# Patient Record
Sex: FEMALE | Race: WHITE | NOT HISPANIC OR LATINO | Employment: OTHER | URBAN - METROPOLITAN AREA
[De-identification: names, ages, dates, MRNs, and addresses within clinical notes are randomized per-mention and may not be internally consistent; named-entity substitution may affect disease eponyms.]

---

## 2017-06-15 ENCOUNTER — HOSPITAL ENCOUNTER (EMERGENCY)
Facility: CLINIC | Age: 62
Discharge: HOME | End: 2017-06-15
Attending: EMERGENCY MEDICINE | Admitting: EMERGENCY MEDICINE
Payer: COMMERCIAL

## 2017-06-15 ENCOUNTER — APPOINTMENT (OUTPATIENT)
Dept: CT IMAGING | Facility: CLINIC | Age: 62
End: 2017-06-15
Attending: EMERGENCY MEDICINE
Payer: COMMERCIAL

## 2017-06-15 ENCOUNTER — APPOINTMENT (OUTPATIENT)
Dept: GENERAL RADIOLOGY | Facility: CLINIC | Age: 62
End: 2017-06-15
Attending: EMERGENCY MEDICINE
Payer: COMMERCIAL

## 2017-06-15 VITALS
TEMPERATURE: 98.3 F | HEIGHT: 69 IN | HEART RATE: 89 BPM | DIASTOLIC BLOOD PRESSURE: 97 MMHG | SYSTOLIC BLOOD PRESSURE: 154 MMHG | RESPIRATION RATE: 20 BRPM | WEIGHT: 130 LBS | OXYGEN SATURATION: 98 % | BODY MASS INDEX: 19.26 KG/M2

## 2017-06-15 DIAGNOSIS — S92.001A CLOSED DISPLACED FRACTURE OF RIGHT CALCANEUS, UNSPECIFIED PORTION OF CALCANEUS, INITIAL ENCOUNTER: ICD-10-CM
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
## 2017-06-15 PROCEDURE — 96374 THER/PROPH/DIAG INJ IV PUSH: CPT

## 2017-06-15 PROCEDURE — 25000128 H RX IP 250 OP 636: Performed by: EMERGENCY MEDICINE

## 2017-06-15 PROCEDURE — 96376 TX/PRO/DX INJ SAME DRUG ADON: CPT

## 2017-06-15 PROCEDURE — 73610 X-RAY EXAM OF ANKLE: CPT | Mod: RT

## 2017-06-15 PROCEDURE — 29515 APPLICATION SHORT LEG SPLINT: CPT | Mod: RT

## 2017-06-15 PROCEDURE — 99285 EMERGENCY DEPT VISIT HI MDM: CPT | Mod: 25

## 2017-06-15 PROCEDURE — 73590 X-RAY EXAM OF LOWER LEG: CPT | Mod: RT

## 2017-06-15 PROCEDURE — 73700 CT LOWER EXTREMITY W/O DYE: CPT | Mod: RT

## 2017-06-15 PROCEDURE — 96375 TX/PRO/DX INJ NEW DRUG ADDON: CPT

## 2017-06-15 PROCEDURE — 73650 X-RAY EXAM OF HEEL: CPT | Mod: RT

## 2017-06-15 PROCEDURE — 25000128 H RX IP 250 OP 636

## 2017-06-15 RX ORDER — ONDANSETRON 2 MG/ML
4 INJECTION INTRAMUSCULAR; INTRAVENOUS ONCE
Status: COMPLETED | OUTPATIENT
Start: 2017-06-15 | End: 2017-06-15

## 2017-06-15 RX ORDER — OXYCODONE HYDROCHLORIDE 5 MG/1
5-10 TABLET ORAL EVERY 6 HOURS PRN
Qty: 20 TABLET | Refills: 0 | Status: SHIPPED | OUTPATIENT
Start: 2017-06-15 | End: 2018-07-08

## 2017-06-15 RX ORDER — KETOROLAC TROMETHAMINE 15 MG/ML
15 INJECTION, SOLUTION INTRAMUSCULAR; INTRAVENOUS ONCE
Status: COMPLETED | OUTPATIENT
Start: 2017-06-15 | End: 2017-06-15

## 2017-06-15 RX ORDER — HYDROMORPHONE HYDROCHLORIDE 1 MG/ML
0.5 INJECTION, SOLUTION INTRAMUSCULAR; INTRAVENOUS; SUBCUTANEOUS
Status: COMPLETED | OUTPATIENT
Start: 2017-06-15 | End: 2017-06-15

## 2017-06-15 RX ORDER — HYDROMORPHONE HYDROCHLORIDE 1 MG/ML
0.5 INJECTION, SOLUTION INTRAMUSCULAR; INTRAVENOUS; SUBCUTANEOUS ONCE
Status: COMPLETED | OUTPATIENT
Start: 2017-06-15 | End: 2017-06-15

## 2017-06-15 RX ORDER — ONDANSETRON 2 MG/ML
INJECTION INTRAMUSCULAR; INTRAVENOUS
Status: COMPLETED
Start: 2017-06-15 | End: 2017-06-15

## 2017-06-15 RX ORDER — HYDROMORPHONE HYDROCHLORIDE 1 MG/ML
INJECTION, SOLUTION INTRAMUSCULAR; INTRAVENOUS; SUBCUTANEOUS
Status: DISCONTINUED
Start: 2017-06-15 | End: 2017-06-15 | Stop reason: HOSPADM

## 2017-06-15 RX ADMIN — KETOROLAC TROMETHAMINE 15 MG: 15 INJECTION, SOLUTION INTRAMUSCULAR; INTRAVENOUS at 14:26

## 2017-06-15 RX ADMIN — ONDANSETRON 4 MG: 2 INJECTION INTRAMUSCULAR; INTRAVENOUS at 13:18

## 2017-06-15 RX ADMIN — HYDROMORPHONE HYDROCHLORIDE 0.5 MG: 1 INJECTION, SOLUTION INTRAMUSCULAR; INTRAVENOUS; SUBCUTANEOUS at 13:16

## 2017-06-15 RX ADMIN — ONDANSETRON 4 MG: 2 SOLUTION INTRAMUSCULAR; INTRAVENOUS at 13:18

## 2017-06-15 RX ADMIN — HYDROMORPHONE HYDROCHLORIDE 0.5 MG: 1 INJECTION, SOLUTION INTRAMUSCULAR; INTRAVENOUS; SUBCUTANEOUS at 14:05

## 2017-06-15 ASSESSMENT — ENCOUNTER SYMPTOMS
SHORTNESS OF BREATH: 0
NUMBNESS: 0
BACK PAIN: 0
ABDOMINAL PAIN: 0
NECK PAIN: 0

## 2017-06-15 NOTE — ED AVS SNAPSHOT
Emergency Department    [address] [place] [address]    Phone:  [phone]    Fax:  [phone]                                       Megan Bettencourt   MRN: [id number]    Department:   Emergency Department   Date of Visit:  6/15/2017           After Visit Summary Signature Page     I have received my discharge instructions, and my questions have been answered. I have discussed any challenges I see with this plan with the nurse or doctor.    ..........................................................................................................................................  Patient/Patient Representative Signature      ..........................................................................................................................................  Patient Representative Print Name and Relationship to Patient    ..................................................               ................................................  Date                                            Time    ..........................................................................................................................................  Reviewed by Signature/Title    ...................................................              ..............................................  Date                                                            Time

## 2017-06-15 NOTE — DISCHARGE INSTRUCTIONS
You have a broken bone on your calcaneal bone  You will likely need surgery   Follow up with [facility] as soon as you can. The doctors that should see you should be Jeronimo Qureshi or Zeinab  Don't walk on the foot  Keep the foot elevated as much as possible  Use tylenol 500 mg every 4 hours for pain (max dose 3000 mg per day)  Use the oxycodone for more severe pain    Discharge Instructions  Extremity Injury    You were seen today for an injury to an extremity (arm, hand, leg, or foot). You may have a bruise, strain, or fracture (broken bone).    Return to the Emergency Department or see your regular doctor if your injured area is not back to normal within 5-7 days.    Return to the Emergency Department right away if:    Your pain seems to change or get worse or there is pain in a new area.    Your extremity becomes pale, cool, blue, or numb or tingling past the injury.    You have more drainage, redness or pain in the area of the cut or abrasion.    You have pain that you can t control with the medicine recommended or prescribed here, or you have pain that seems too much for your injury.    Your child will not stop crying or is much more fussy than normal.    You have new symptoms or anything that worries you.    What to Expect:    Your swelling and pain may be worse the day after your injury, but should not be severe and should start getting better after that. You should not have new symptoms and your pain should not get worse.    You may start to get a bruise over the injured area or below the injured area.    Your movement and strength should get better with time.    Some injuries may not show up until after you have left the Emergency Department so it is important to follow-up with your regular doctor.    Your injury may prevent you from working.  Follow-up with your regular doctor to get a work release note.    Pain medications or your injury may make it unsafe to drive or operate  machinery.    Home Care:    Apply ice your injured area for 15 minutes at a time, at least 3 times a day. Use a cloth between the ice bag and your skin to prevent frostbite.     Do not sleep with an ice pack or heating pad on, since this can cause burns or skin injury.    Rest your injured area for at least 1-2 days. After that you may start using your extremity again as long as there is not too much pain.     Raise the injured area above the level of your heart as much as possible in the first 1-2 days.    Use Tylenol  (acetaminophen), Motrin (ibuprofen), or Advil  (ibuprofen) for your pain unless you have an allergy or are told not to use these medications by your doctor.  Take the medications as instructed on the package. Tylenol  (acetaminophen) is in many prescription medicines and non-prescription medicines--check all of your medicines to be sure you aren t taking more than 3000 mg per day.    You may use an elastic bandage (Ace  Wrap) if it makes you more comfortable. Wrap it just tight enough to provide light compression, like a new pair of socks feels. Loosen the bandage if you have swelling past the bandage.      Please follow any other instructions that were discussed with you by your doctor.    MORE INFORMATION:    X-rays:  X-rays done today were read by your doctor but will also be read by a radiologist.  We will contact you if the radiologist sees anything different on the x-ray.  Your regular doctor may also want to review your x-rays on follow-up.    You could have a fracture (break), even if we told you your x-rays were normal. X-rays are not always certain, and some fractures are hard to see and may not show up right away.  Also, your x-ray may look like you have a fracture, even though you do not.  It is important to follow-up with your regular doctor.     Stretching:  If your injury was to your arm or shoulder and your doctor put you in a sling or an immobilizer, it is important that you take off  your immobilizer within 3 days and stretch/move your shoulder, unless your doctor specifically tells you to not move your shoulder.  This is to prevent further injury such as a  frozen shoulder .     If you were given a prescription for medicine here today, be sure to read all of the information (including the package insert) that comes with your prescription.  This will include important information about the medicine, its side effects, and any warnings that you need to know about.  The pharmacist who fills the prescription can provide more information and answer questions you may have about the medicine.  If you have questions or concerns that the pharmacist cannot address, please call or return to the Emergency Department.     Opioid Medication Information    Pain medications are among the most commonly prescribed medicines, so we are including this information for all our patients. If you did not receive pain medication or get a prescription for pain medicine, you can ignore it.     You may have been given a prescription for an opioid (narcotic) pain medicine and/or have received a pain medicine while here in the Emergency Department. These medicines can make you drowsy or impaired. You must not drive, operate dangerous equipment, or engage in any other dangerous activities while taking these medications. If you drive while taking these medications, you could be arrested for DUI, or driving under the influence. Do not drink any alcohol while you are taking these medications.     Opioid pain medications can cause addiction. If you have a history of chemical dependency of any type, you are at a higher risk of becoming addicted to pain medications.  Only take these prescribed medications to treat your pain when all other options have been tried. Take it for as short a time and as few doses as possible. Store your pain pills in a secure place, as they are frequently stolen and provide a dangerous opportunity for  children or visitors in your house to start abusing these powerful medications. We will not replace any lost or stolen medicine.  As soon as your pain is better, you should flush all your remaining medication.     Many prescription pain medications contain Tylenol  (acetaminophen), including Vicodin , Tylenol #3 , Norco , Lortab , and Percocet .  You should not take any extra pills of Tylenol  if you are using these prescription medications or you can get very sick.  Do not ever take more than 3000 mg of acetaminophen in any 24 hour period.    All opioids tend to cause constipation. Drink plenty of water and eat foods that have a lot of fiber, such as fruits, vegetables, prune juice, apple juice and high fiber cereal.  Take a laxative if you don t move your bowels at least every other day. Miralax , Milk of Magnesia, Colace , or Senna  can be used to keep you regular.      Remember that you can always come back to the Emergency Department if you are not able to see your regular doctor in the amount of time listed above, if you get any new symptoms, or if there is anything that worries you.

## 2017-06-15 NOTE — ED AVS SNAPSHOT
Emergency Department    [address] [facility] [address]    Phone:  [phone]    Fax:  [phone]                                       Megan Bettencourt   MRN: [id number]    Department:   Emergency Department   Date of Visit:  6/15/2017           Patient Information     Date Of Birth          1955        Your diagnoses for this visit were:     Closed displaced fracture of right calcaneus, unspecified portion of calcaneus, initial encounter     Fall, initial encounter        You were seen by Mercedes Vargas MD.      Follow-up Information     Schedule an appointment as soon as possible for a visit with Orthopaedics, [facility].    Contact information:    [address] [address]  [phone]          Follow up with  Emergency Department.    Specialty:  EMERGENCY MEDICINE    Why:  If symptoms worsen    Contact information:    [address] [facility] [id number] [phone]        Discharge Instructions       You have a broken bone on your calcaneal bone  You will likely need surgery   Follow up with [facility] as soon as you can. The doctors that should see you should be Jeronimo Qureshi or Zeinab  Don't walk on the foot  Keep the foot elevated as much as possible  Use tylenol 500 mg every 4 hours for pain (max dose 3000 mg per day)  Use the oxycodone for more severe pain    Discharge Instructions  Extremity Injury    You were seen today for an injury to an extremity (arm, hand, leg, or foot). You may have a bruise, strain, or fracture (broken bone).    Return to the Emergency Department or see your regular doctor if your injured area is not back to normal within 5-7 days.    Return to the Emergency Department right away if:    Your pain seems to change or get worse or there is pain in a new area.    Your extremity becomes pale, cool, blue, or numb or tingling past the injury.    You have more drainage, redness or pain in the  area of the cut or abrasion.    You have pain that you can t control with the medicine recommended or prescribed here, or you have pain that seems too much for your injury.    Your child will not stop crying or is much more fussy than normal.    You have new symptoms or anything that worries you.    What to Expect:    Your swelling and pain may be worse the day after your injury, but should not be severe and should start getting better after that. You should not have new symptoms and your pain should not get worse.    You may start to get a bruise over the injured area or below the injured area.    Your movement and strength should get better with time.    Some injuries may not show up until after you have left the Emergency Department so it is important to follow-up with your regular doctor.    Your injury may prevent you from working.  Follow-up with your regular doctor to get a work release note.    Pain medications or your injury may make it unsafe to drive or operate machinery.    Home Care:    Apply ice your injured area for 15 minutes at a time, at least 3 times a day. Use a cloth between the ice bag and your skin to prevent frostbite.     Do not sleep with an ice pack or heating pad on, since this can cause burns or skin injury.    Rest your injured area for at least 1-2 days. After that you may start using your extremity again as long as there is not too much pain.     Raise the injured area above the level of your heart as much as possible in the first 1-2 days.    Use Tylenol  (acetaminophen), Motrin (ibuprofen), or Advil  (ibuprofen) for your pain unless you have an allergy or are told not to use these medications by your doctor.  Take the medications as instructed on the package. Tylenol  (acetaminophen) is in many prescription medicines and non-prescription medicines--check all of your medicines to be sure you aren t taking more than 3000 mg per day.    You may use an elastic bandage (Ace  Wrap) if it  makes you more comfortable. Wrap it just tight enough to provide light compression, like a new pair of socks feels. Loosen the bandage if you have swelling past the bandage.      Please follow any other instructions that were discussed with you by your doctor.    MORE INFORMATION:    X-rays:  X-rays done today were read by your doctor but will also be read by a radiologist.  We will contact you if the radiologist sees anything different on the x-ray.  Your regular doctor may also want to review your x-rays on follow-up.    You could have a fracture (break), even if we told you your x-rays were normal. X-rays are not always certain, and some fractures are hard to see and may not show up right away.  Also, your x-ray may look like you have a fracture, even though you do not.  It is important to follow-up with your regular doctor.     Stretching:  If your injury was to your arm or shoulder and your doctor put you in a sling or an immobilizer, it is important that you take off your immobilizer within 3 days and stretch/move your shoulder, unless your doctor specifically tells you to not move your shoulder.  This is to prevent further injury such as a  frozen shoulder .     If you were given a prescription for medicine here today, be sure to read all of the information (including the package insert) that comes with your prescription.  This will include important information about the medicine, its side effects, and any warnings that you need to know about.  The pharmacist who fills the prescription can provide more information and answer questions you may have about the medicine.  If you have questions or concerns that the pharmacist cannot address, please call or return to the Emergency Department.     Opioid Medication Information    Pain medications are among the most commonly prescribed medicines, so we are including this information for all our patients. If you did not receive pain medication or get a prescription  for pain medicine, you can ignore it.     You may have been given a prescription for an opioid (narcotic) pain medicine and/or have received a pain medicine while here in the Emergency Department. These medicines can make you drowsy or impaired. You must not drive, operate dangerous equipment, or engage in any other dangerous activities while taking these medications. If you drive while taking these medications, you could be arrested for DUI, or driving under the influence. Do not drink any alcohol while you are taking these medications.     Opioid pain medications can cause addiction. If you have a history of chemical dependency of any type, you are at a higher risk of becoming addicted to pain medications.  Only take these prescribed medications to treat your pain when all other options have been tried. Take it for as short a time and as few doses as possible. Store your pain pills in a secure place, as they are frequently stolen and provide a dangerous opportunity for children or visitors in your house to start abusing these powerful medications. We will not replace any lost or stolen medicine.  As soon as your pain is better, you should flush all your remaining medication.     Many prescription pain medications contain Tylenol  (acetaminophen), including Vicodin , Tylenol #3 , Norco , Lortab , and Percocet .  You should not take any extra pills of Tylenol  if you are using these prescription medications or you can get very sick.  Do not ever take more than 3000 mg of acetaminophen in any 24 hour period.    All opioids tend to cause constipation. Drink plenty of water and eat foods that have a lot of fiber, such as fruits, vegetables, prune juice, apple juice and high fiber cereal.  Take a laxative if you don t move your bowels at least every other day. Miralax , Milk of Magnesia, Colace , or Senna  can be used to keep you regular.      Remember that you can always come back to the Emergency Department if you are  not able to see your regular doctor in the amount of time listed above, if you get any new symptoms, or if there is anything that worries you.        24 Hour Appointment Hotline       To make an appointment at any [facility] clinic, call [phone] ([phone]). If you don't have a family doctor or clinic, we will help you find one. [facility] clinics are conveniently located to serve the needs of you and your family.             Review of your medicines      START taking        Dose / Directions Last dose taken    oxyCODONE 5 MG IR tablet   Commonly known as:  ROXICODONE   Dose:  5-10 mg   Quantity:  20 tablet        Take 1-2 tablets (5-10 mg) by mouth every 6 hours as needed for pain   Refills:  0          Our records show that you are taking the medicines listed below. If these are incorrect, please call your family doctor or clinic.        Dose / Directions Last dose taken    ADDERALL PO        Refills:  0        SYNTHROID PO        Refills:  0        venlafaxine 75 MG 24 hr capsule   Commonly known as:  EFFEXOR-XR   Dose:  175 mg   Indication:  Panic Disorder        Take 175 mg by mouth daily   Refills:  0                Prescriptions were sent or printed at these locations (1 Prescription)                   Other Prescriptions                Printed at Department/Unit printer (1 of 1)         oxyCODONE (ROXICODONE) 5 MG IR tablet                Procedures and tests performed during your visit     Ankle XR, G/E 3 views, right    CT Foot Right w/o Contrast    Tib/Fib XR, right    XR Calcaneus Right G/E 2 Views      Orders Needing Specimen Collection     None      Pending Results     No orders found from 6/13/2017 to 6/16/2017.            Pending Culture Results     No orders found from 6/13/2017 to 6/16/2017.            Pending Results Instructions     If you had any lab results that were not finalized at the time of your Discharge, you can call the ED Lab Result RN at [phone]. You will be contacted by  this team for any positive Lab results or changes in treatment. The nurses are available 7 days a week from 10A to 6:30P.  You can leave a message 24 hours per day and they will return your call.        Test Results From Your Hospital Stay        6/15/2017  1:55 PM      Narrative     XR ANKLE RT G/E 3 VW 6/15/2017 1:52 PM    HISTORY: Pain.    COMPARISON: None.        Impression     IMPRESSION: Lateral view demonstrates a mildly displaced calcaneal  fracture. The ankle mortise is intact.    JLUIS MINAYA MD         6/15/2017  1:56 PM      Narrative     XR TIBIA & FIBULA RT 2 VW 6/15/2017 1:53 PM    HISTORY: Pain.    COMPARISON: None.        Impression     IMPRESSION: No evidence of acute fracture or malalignment.    JLUIS MINAYA MD         6/15/2017  1:57 PM      Narrative     XR CALCANEUS RT G/E 2 VW 6/15/2017 1:52 PM    HISTORY: Pain.    COMPARISON: None.        Impression     IMPRESSION: Mildly displaced fracture of the calcaneus, likely  comminuted.    JLUIS MINAYA MD         6/15/2017  4:35 PM      Narrative     CT FOOT RIGHT WITHOUT CONTRAST  6/15/2017 2:46 PM    HISTORY: Evaluate calcaneus fracture.    TECHNIQUE: Helical axial scans with sagittal and coronal  reconstructions. Radiation dose for this scan was reduced using  automated exposure control, adjustment of the mA and/or kV according  to patient size, or iterative reconstruction technique.    COMPARISON: Plain film 6/15/2017.    FINDINGS: There is a comminuted three-part calcaneal fracture with  mild joint depression. There is intra-articular extension into the  lateral aspect of the subtalar joint with about 2 mm displacement  consistent with Evans classification type II A. There is also a  comminuted fracture in the sustentaculum maegan extending laterally into  the inferior sinus tarsi. Remainder of the foot and ankle show no  acute bony abnormalities. A type II accessory navicular is noted.  Subcutaneous edema is seen. Periarticular soft tissues  otherwise  appear normal to the extent visualized by CT.        Impression     IMPRESSION:  1. Comminuted three-part calcaneal fracture with mild joint depression  and single lateral focus of intra-articular extension indicating  Evans classification type II A.  2. Additional comminuted fracture sustentaculum maegan.  3. Type II accessory navicular is noted.    ROYCE MCCALLUM MD                Clinical Quality Measure: Blood Pressure Screening     Your blood pressure was checked while you were in the emergency department today. The last reading we obtained was  BP: 137/84 . Please read the guidelines below about what these numbers mean and what you should do about them.  If your systolic blood pressure (the top number) is less than 120 and your diastolic blood pressure (the bottom number) is less than 80, then your blood pressure is normal. There is nothing more that you need to do about it.  If your systolic blood pressure (the top number) is 120-139 or your diastolic blood pressure (the bottom number) is 80-89, your blood pressure may be higher than it should be. You should have your blood pressure rechecked within a year by a primary care provider.  If your systolic blood pressure (the top number) is 140 or greater or your diastolic blood pressure (the bottom number) is 90 or greater, you may have high blood pressure. High blood pressure is treatable, but if left untreated over time it can put you at risk for heart attack, stroke, or kidney failure. You should have your blood pressure rechecked by a primary care provider within the next 4 weeks.  If your provider in the emergency department today gave you specific instructions to follow-up with your doctor or provider even sooner than that, you should follow that instruction and not wait for up to 4 weeks for your follow-up visit.        Thank you for choosing [facility]       Thank you for choosing [facility] for your care. Our goal is always to provide you with  "excellent care. Hearing back from our patients is one way we can continue to improve our services. Please take a few minutes to complete the written survey that you may receive in the mail after you visit with us. Thank you!        [employer] Information     [employer] lets you send messages to your doctor, view your test results, renew your prescriptions, schedule appointments and more. To sign up, go to [email or web address] . Click on \"Log in\" on the left side of the screen, which will take you to the Welcome page. Then click on \"Sign up Now\" on the right side of the page.     You will be asked to enter the access code listed below, as well as some personal information. Please follow the directions to create your username and password.     Your access code is: [email or web address]  Expires: 2017  4:45 PM     Your access code will  in 90 days. If you need help or a new code, please call your [facility] clinic or [phone].        Care EveryWhere ID     This is your Care EveryWhere ID. This could be used by other organizations to access your [facility] medical records  [id number]        After Visit Summary       This is your record. Keep this with you and show to your community pharmacist(s) and doctor(s) at your next visit.                  "

## 2017-06-15 NOTE — ED PROVIDER NOTES
"  History     Chief Complaint:  Fall    HPI   Megan Bettencourt is a 62 year old female with a history of Anxiety, cervical stenosis, and thyroid disease who presents to the emergency department for evaluation of right ankle pain following a mechanical fall. The patient reports falling 10 feet off a roof approximately one hour ago and landed on her bilateral feet. She denies striking her head and any loss of consciousness with this incident. Following this fall, the patient reporting having intense right ankle pain, which radiates to her right leg as well. This intense pain is what prompted her ED visit today. She denies sustaining any other injuries as a result of this fall and denies any pain in her left leg and foot, commenting that side is \"perfectly normally\". She also denies any numbness, back pain, neck pain, abdominal pain, or shortness of breath. Not on anticoagulation.    Allergies:  Morphine Hcl,  itching   Vicodin [Hydrocodone-Acetaminophen], itching     Medications:    Synthroid  Adderall  Effexor    Past Medical History:    Anxiety  Cervical stenosis of spine   Thyroid disease    Past Surgical History:    Breast augmentation     Cosmetic blepharoplasty of bilateral lower lids  Cosmetic neck life  Bilateral ptosis repair    Family History:    No past pertinent family history.    Social History:  Presents alone.   Negative for tobacco use.  Positive for alcohol use, occasionally.   Marital Status:  Single [1]    Review of Systems   Respiratory: Negative for shortness of breath.    Gastrointestinal: Negative for abdominal pain.   Musculoskeletal: Negative for back pain and neck pain.        Positive for right ankle pain.   Neurological: Negative for numbness.   All other systems reviewed and are negative.    Physical Exam   First Vitals:  BP: 137/84  Pulse: 109  Temp: 98.3  F (36.8  C)  Resp: 28  Height: 175.3 cm (5' 9\")  Weight: 59 kg (130 lb)  SpO2: 99 %      Physical Exam  General: Resting " uncomfortably on the gurney  Eyes:  The pupils are equal and round  ENT:    Moist mucous membranes    No head trauma  Neck:  Normal range of motion    No neck pain  CV:  Tachycardic rate and regular rhythm     Skin warm and well perfused     DP/PT pulses 2+ bilaterally  Resp:  Lungs are clear    No rales    No wheezing   GI:  Abdomen is soft, there is no rigidity    No distension    No rebound tenderness    No abdominal tenderness   MS:  Normal muscular tone    No midline back tenderness    Swelling of lateral aspect of right foot.    Tenderness over right calcaneous    Non tender left lower extremity  Skin:  No rash or acute skin lesions noted  Neuro:   Awake, alert.      Speech is normal and fluent.    Face is symmetric.     Moves all extremities though less movement of right ankle due to pain    SILT on bilateral LE    GCS 15  Psych:  Normal affect.  Appropriate interactions.    Emergency Department Course   Imaging:  Radiographic findings were communicated with the patient who voiced understanding of the findings.    CT Foot Right without contrast:   1. Comminuted three-part calcaneal fracture with mild joint depression  and single lateral focus of intra-articular extension indicating  Evans classification type II A.  2. Additional comminuted fracture sustentaculum maegan.  3. Type II accessory navicular is noted. As per radiology.    Tib/fib XR, right:  No evidence of acute fracture or malalignment. As per radiology.     XR Calcaneus Right G/E 2 views:   Mildly displaced fracture of the calcaneus, likely  comminuted. As per radiology.     Ankle XR, G/E 3, right views:   Lateral view demonstrates a mildly displaced calcaneal  fracture. The ankle mortise is intact. As per radiology.     Procedures:      Narrative:      Splint was applied to the right lower extremity and after placement I checked and adjusted the fit to ensure proper positioning. Patient was more comfortable with ankle splint in place. Sensation  and circulation are intact after splint placement.    Interventions:  1316 Dilaudid, 0.5 mg, IV injection  1318 Zofran, 4 mg, IV injection  1405 Dilaudid, 0.5 mg, IV injection  1426 Toradol, 15 mg, IV injection    Emergency Department Course:  Nursing notes and vitals reviewed. I performed an exam of the patient as documented above.     IV inserted. Medicine administered as documented above. Blood drawn. This was sent to the lab for further testing, results above.    The patient was sent for a right foot CT, a Tib/fib XR, calcaneous XR, and ankle XR while in the emergency department, findings above.     1402 I rechecked the patient and discussed the results of their workup thus far. The patient denies any left foot pain and back pain.    1409 I rechecked the patient and discussed I would consult with orthopedics regarding her right ankle and foot.     1416 I consulted with Dr. Fritz, orthopedics, regarding the patient's history and presentation here in the emergency department. He recommended obtaining a CT.    1420 I rechecked the patient and updated her with orthopedist recommendation.    The patient was sent for a CT foot  while in the emergency department, findings above.     1526 The patient was placed in a right ankle splint, see procedure note above.    The patient was ambulated here in the emergency department with crutches without difficulty.     Findings and plan explained to the Patient. Patient discharged home with instructions regarding supportive care, medications, and reasons to return. The importance of close follow-up was reviewed. The patient was prescribed oxycodone.    Impression & Plan    Medical Decision Making:  Megan Bettencourt is a 62 year old female who presents to the emergency department after a fall. She thinks she fell about 10 feet. She did not hit her head and denies any injury anywhere else or any other pain. Her vital signs are normal. Neurovascularly intact and GCS 15. She appears  very uncomfortable upon her arrival to the emergency department, which is probably the cause her tachypnea and tachycardia. She is complaining of right foot pain. There is a mild amount of swelling by her right lateral aspect of her foot. No open wounds. She is tender over her right calcaneous. She denies any tenderness over her left calcaneous. She also denies pain over her abdomen or back. There is no pain on palpation of her back. The patient is given IV pain medication and her pain significantly improved. She was much more comfortable in the room and continues to deny back pain or left foot pain. X-ray was obtained and she does have a right calcaneous fracture. I discussed the patient with orthopedics, who does recommend CT of the foot to better assess this fracture. Placed in splint. The patient was given crutches and advised to elevate as much as possible and follow up with [facility] orthopedics. There are three doctors she should follow up with who specialize in this fracture and these were written down for the patient. Will likely need surgery.  The patient did have recent screening for osteoporosis and her levels are all normal; she reports she is already taking vitamin D and calcium. I recommended that she elevate her leg as much as possible. Patient continues to only have pain in right calcaneus. Does not meet criteria for imaging of head/neck/abdomen/back given that she has no pain, no LOC, GCS 15.Reasons to return to the emergency department were discussed with the patient.     Diagnosis:    ICD-10-CM   1. Closed displaced fracture of right calcaneus, unspecified portion of calcaneus, initial encounter S92.001A   2. Fall, initial encounter W19.XXXA       Disposition:  discharged to home    Discharge Medications:  New Prescriptions    OXYCODONE (ROXICODONE) 5 MG IR TABLET    Take 1-2 tablets (5-10 mg) by mouth every 6 hours as needed for pain       I, Katie Perkins, am serving as a scribe on 6/15/2017 at  1:24 PM to personally document services performed by Mercedes Vargas MD,  based on my observations and the provider's statements to me.     Katie Perkins  6/15/2017    EMERGENCY DEPARTMENT       Mercedes Vargas MD  06/15/17 8520

## 2018-03-07 ENCOUNTER — HOSPITAL ENCOUNTER (EMERGENCY)
Facility: CLINIC | Age: 63
Discharge: HOME | End: 2018-03-07
Attending: EMERGENCY MEDICINE | Admitting: EMERGENCY MEDICINE
Payer: COMMERCIAL

## 2018-03-07 ENCOUNTER — APPOINTMENT (OUTPATIENT)
Dept: GENERAL RADIOLOGY | Facility: CLINIC | Age: 63
End: 2018-03-07
Attending: EMERGENCY MEDICINE
Payer: COMMERCIAL

## 2018-03-07 VITALS
RESPIRATION RATE: 16 BRPM | SYSTOLIC BLOOD PRESSURE: 157 MMHG | DIASTOLIC BLOOD PRESSURE: 112 MMHG | OXYGEN SATURATION: 99 % | TEMPERATURE: 98.6 F

## 2018-03-07 DIAGNOSIS — S63.501A WRIST SPRAIN, RIGHT, INITIAL ENCOUNTER: ICD-10-CM

## 2018-03-07 PROCEDURE — 73110 X-RAY EXAM OF WRIST: CPT | Mod: RT

## 2018-03-07 PROCEDURE — 99283 EMERGENCY DEPT VISIT LOW MDM: CPT

## 2018-03-07 ASSESSMENT — ENCOUNTER SYMPTOMS
ARTHRALGIAS: 1
NUMBNESS: 0

## 2018-03-07 NOTE — ED AVS SNAPSHOT
Emergency Department    [address] [facility] [address]    Phone:  [phone]    Fax:  [phone]                                       Megan Bettencourt   MRN: [id number]    Department:   Emergency Department   Date of Visit:  3/7/2018           Patient Information     Date Of Birth          1955        Your diagnoses for this visit were:     Wrist sprain, right, initial encounter        You were seen by Trierweiler, Chad A, MD.      Follow-up Information     Follow up with Gunnar Sarkar MD In 1 week.    Specialty:  Family Practice    Why:  As needed    Contact information:    [facility]  [address]  [facility] [address]  [phone]          Follow up with  Emergency Department.    Specialty:  EMERGENCY MEDICINE    Why:  If symptoms worsen    Contact information:    [address] [facility] [address] [phone]        Discharge Instructions         Wrist Sprain  A sprain is an injury to the ligaments or capsule that holds a joint together. There are no broken bones. Most sprains take about 3 to 6 weeks to heal. If it a severe sprain where the ligament is completely torn, it can take months to recover.     Most wrist sprains are treated with a splint, wrist brace, or elastic wrap for support. Severe sprains may require surgery.  Home care    Keep your arm elevated to reduce pain and swelling. This is very important during the first 48 hours.    Apply an ice pack over the injured area for 15 to 20 minutes every 3 to 6 hours. You should do this for the first 24 to 48 hours. You can make an ice pack by filling a plastic bag that seals at the top with ice cubes and then wrapping it with a thin towel. Continue to use ice packs for relief of pain and swelling as needed. As the ice melts, be careful to avoid getting your wrap, splint, or cast wet. After 48 hours, apply heat (warm shower or warm bath) for 15 to 20 minutes several times a day, or  alternate ice and heat.     You may use over-the-counter pain medicine to control pain, unless another pain medicine was prescribed. If you have chronic liver or kidney disease or ever had a stomach ulcer or GI bleeding, talk with your doctor before using these medicines.    If you were given a splint or brace, wear it for the time advised by your doctor.  Follow-up care  Follow up with your healthcare provider as advised. Any X-rays you had today don t show any broken bones, breaks, or fractures. Sometimes fractures don t show up on the first X-ray. Bruises and sprains can sometimes hurt as much as a fracture. These injuries can take time to heal completely. If your symptoms don t improve or they get worse, talk with your doctor. You may need a repeat X-ray. If X-rays were taken, you will be told of any new findings that may affect your care.  When to seek medical advice  Call your healthcare provider right away if any of these occur:    Pain or swelling increases    Fingers or hand becomes cold, blue, numb, or tingly  Date Last Reviewed: 11/20/2015 2000-2017 [employer]. [address] [place] [address]. All rights reserved. This information is not intended as a substitute for professional medical care. Always follow your healthcare professional's instructions.          24 Hour Appointment Hotline       To make an appointment at any [facility], call [phone] ([phone]). If you don't have a family doctor or clinic, we will help you find one. [facility] are conveniently located to serve the needs of you and your family.             Review of your medicines      Our records show that you are taking the medicines listed below. If these are incorrect, please call your family doctor or clinic.        Dose / Directions Last dose taken    ADDERALL PO        Refills:  0        oxyCODONE IR 5 MG tablet   Commonly known as:  ROXICODONE   Dose:  5-10 mg   Quantity:  20 tablet         Take 1-2 tablets (5-10 mg) by mouth every 6 hours as needed for pain   Refills:  0        SYNTHROID PO        Refills:  0        venlafaxine 75 MG 24 hr capsule   Commonly known as:  EFFEXOR-XR   Dose:  175 mg   Indication:  Panic Disorder        Take 175 mg by mouth daily   Refills:  0                Procedures and tests performed during your visit     Wrist XR, G/E 3 views, right      Orders Needing Specimen Collection     None      Pending Results     Date and Time Order Name Status Description    3/7/2018 1929 Wrist XR, G/E 3 views, right Preliminary             Pending Culture Results     No orders found from 3/5/2018 to 3/8/2018.            Pending Results Instructions     If you had any lab results that were not finalized at the time of your Discharge, you can call the ED Lab Result RN at [phone]. You will be contacted by this team for any positive Lab results or changes in treatment. The nurses are available 7 days a week from 10A to 6:30P.  You can leave a message 24 hours per day and they will return your call.        Test Results From Your Hospital Stay        3/7/2018  8:30 PM      Narrative     WRIST RIGHT THREE OR MORE VIEWS   3/7/2018 7:37 PM     HISTORY: Right wrist pain after fall.     COMPARISON: 12/7/2016        Impression     IMPRESSION: Plate and screw fixation of distal radius seen for  previous radial fracture. Displaced fracture of the ulnar styloid  appears similar to prior. No new lucent fracture lines identified.  Bony alignment appears to be within normal limits. There are mild  degenerative changes in the carpal bones.                Clinical Quality Measure: Blood Pressure Screening     Your blood pressure was checked while you were in the emergency department today. The last reading we obtained was  BP: (!) 157/112 . Please read the guidelines below about what these numbers mean and what you should do about them.  If your systolic blood pressure (the top number) is less  "than 120 and your diastolic blood pressure (the bottom number) is less than 80, then your blood pressure is normal. There is nothing more that you need to do about it.  If your systolic blood pressure (the top number) is 120-139 or your diastolic blood pressure (the bottom number) is 80-89, your blood pressure may be higher than it should be. You should have your blood pressure rechecked within a year by a primary care provider.  If your systolic blood pressure (the top number) is 140 or greater or your diastolic blood pressure (the bottom number) is 90 or greater, you may have high blood pressure. High blood pressure is treatable, but if left untreated over time it can put you at risk for heart attack, stroke, or kidney failure. You should have your blood pressure rechecked by a primary care provider within the next 4 weeks.  If your provider in the emergency department today gave you specific instructions to follow-up with your doctor or provider even sooner than that, you should follow that instruction and not wait for up to 4 weeks for your follow-up visit.        Thank you for choosing [facility]       Thank you for choosing [facility] for your care. Our goal is always to provide you with excellent care. Hearing back from our patients is one way we can continue to improve our services. Please take a few minutes to complete the written survey that you may receive in the mail after you visit with us. Thank you!        [employer] Information     [employer] lets you send messages to your doctor, view your test results, renew your prescriptions, schedule appointments and more. To sign up, go to [email or web address] . Click on \"Log in\" on the left side of the screen, which will take you to the Welcome page. Then click on \"Sign up Now\" on the right side of the page.     You will be asked to enter the access code listed below, as well as some personal information. Please follow the directions to create your username and " password.     Your access code is: [email or web address]  Expires: 2018  8:47 PM     Your access code will  in 90 days. If you need help or a new code, please call your [facility] clinic or [phone].        Care EveryWhere ID     This is your Care EveryWhere ID. This could be used by other organizations to access your [facility] medical records  [id number]        Equal Access to Services     RENITA TREVIZO : Hadii sam solorio hadasho Soomaali, waaxda luqadaha, qaybta kaalmada adeegyada, tomás meyer . So [facility] [phone].    ATENCIÓN: Si habla español, tiene a lynn disposición servicios gratuitos de asistencia lingüística. Llame al [phone].    We comply with applicable federal civil rights laws and [place] laws. We do not discriminate on the basis of race, color, national origin, age, disability, sex, sexual orientation, or gender identity.            After Visit Summary       This is your record. Keep this with you and show to your community pharmacist(s) and doctor(s) at your next visit.

## 2018-03-07 NOTE — ED AVS SNAPSHOT
Emergency Department    [address] [place] [address]    Phone:  [phone]    Fax:  [phone]                                       Megan Bettencourt   MRN: [id number]    Department:   Emergency Department   Date of Visit:  3/7/2018           After Visit Summary Signature Page     I have received my discharge instructions, and my questions have been answered. I have discussed any challenges I see with this plan with the nurse or doctor.    ..........................................................................................................................................  Patient/Patient Representative Signature      ..........................................................................................................................................  Patient Representative Print Name and Relationship to Patient    ..................................................               ................................................  Date                                            Time    ..........................................................................................................................................  Reviewed by Signature/Title    ...................................................              ..............................................  Date                                                            Time

## 2018-03-08 NOTE — ED PROVIDER NOTES
History     Chief Complaint:  Fall     HPI   Megan Bettencourt is a 62 year old female who presents to the ED for evaluation of mechanical fall. The patient reports she slipped on ice today and fell backwards, landing on her right wrist. The patient notes the pain is an achy sensation. The patient denies any loss of consciousness, head trauma, numbness, tingling, or other injuries.      Allergies:  Morphine Hcl   Vicodin       Medications:    Levothyroxine sodium   Adderall  Effexor    Past Medical History:    Anxiety  Spine cervical stenosis   Thyroid disease    Past Surgical History:    Breast augmentation     Cosmetic bilateral lower lids blepharoplasty   Cosmetic neck lift  Orthopedic surgery   Bilateral ptosis repair    Family History:    History reviewed. No pertinent family history.     Social History:  Smoking status: Never smoker  Alcohol use: Occasionally   Presents to ED alone   Marital Status:  Single [1]     Review of Systems   Musculoskeletal: Positive for arthralgias.   Neurological: Negative for syncope and numbness.   All other systems reviewed and are negative.    Physical Exam     Patient Vitals for the past 24 hrs:   BP Temp Temp src Heart Rate Resp SpO2   18 1924 (!) 157/112 98.6  F (37  C) Oral 98 16 99 %     Physical Exam  MSK:  Mild tenderness diffusely throughout dorsal wrist without swelling or deficit of motion. Normal movement through the elbow, wrist, and fingers without tendonous deficit.    SKIN:  Warm and dry with strong radial pulse and normal capillary refill.    NEURO:  5/5 strength through the fingers/wrist/elbow.  Normal sensation through the radial/ulnar/median nerve distributions.    PSYCH:  Normal affect    Emergency Department Course     Imaging:  Radiographic findings were communicated with the patient who voiced understanding of the findings.    Wrist XR, G/E 3 Views, Right  IMPRESSION: Plate and screw fixation of distal radius seen for  previous radial  fracture. Displaced fracture of the ulnar styloid  appears similar to prior. No new lucent fracture lines identified.  Bony alignment appears to be within normal limits. There are mild  degenerative changes in the carpal bones. As read by Radiology.     Emergency Department Course:  Past medical records, nursing notes, and vitals reviewed.  2037: I performed an exam of the patient and obtained history, as documented above.    The patient was sent for a right wrist x-ray while in the emergency department, findings above.    Findings and plan explained to the Patient. Patient discharged home with instructions regarding supportive care, medications, and reasons to return. The importance of close follow-up was reviewed.     Impression & Plan      Medical Decision Making:  This pleasant 62-year-old presents for evaluation of her wrist after suffering a slip and fall on the ice.  She broke this wrist in the past and was concerned that it was rebroken her that there was failure of her hardware.  Fortunately, her x-ray is unremarkable.  She does not show any other signs of significant injury.  We discussed ice and anti-inflammatories and she did likely strain and contused this arm.  Otherwise, she was advised to return to us immediately if she has worsening of her discomfort or any other emergent concerns.    Diagnosis:    ICD-10-CM   1. Wrist sprain, right, initial encounter S63.501A     Disposition: Patient discharged to home     Becca Pro  3/7/2018    EMERGENCY DEPARTMENT    I, Becca Pro, am serving as a scribe at 8:37 PM on 3/7/2018 to document services personally performed by Trierweiler, Chad A, MD based on my observations and the provider's statements to me.        Trierweiler, Chad A, MD  03/09/18 3699

## 2018-03-08 NOTE — DISCHARGE INSTRUCTIONS
Wrist Sprain  A sprain is an injury to the ligaments or capsule that holds a joint together. There are no broken bones. Most sprains take about 3 to 6 weeks to heal. If it a severe sprain where the ligament is completely torn, it can take months to recover.     Most wrist sprains are treated with a splint, wrist brace, or elastic wrap for support. Severe sprains may require surgery.  Home care    Keep your arm elevated to reduce pain and swelling. This is very important during the first 48 hours.    Apply an ice pack over the injured area for 15 to 20 minutes every 3 to 6 hours. You should do this for the first 24 to 48 hours. You can make an ice pack by filling a plastic bag that seals at the top with ice cubes and then wrapping it with a thin towel. Continue to use ice packs for relief of pain and swelling as needed. As the ice melts, be careful to avoid getting your wrap, splint, or cast wet. After 48 hours, apply heat (warm shower or warm bath) for 15 to 20 minutes several times a day, or alternate ice and heat.     You may use over-the-counter pain medicine to control pain, unless another pain medicine was prescribed. If you have chronic liver or kidney disease or ever had a stomach ulcer or GI bleeding, talk with your doctor before using these medicines.    If you were given a splint or brace, wear it for the time advised by your doctor.  Follow-up care  Follow up with your healthcare provider as advised. Any X-rays you had today don t show any broken bones, breaks, or fractures. Sometimes fractures don t show up on the first X-ray. Bruises and sprains can sometimes hurt as much as a fracture. These injuries can take time to heal completely. If your symptoms don t improve or they get worse, talk with your doctor. You may need a repeat X-ray. If X-rays were taken, you will be told of any new findings that may affect your care.  When to seek medical advice  Call your healthcare provider right away if any of  these occur:    Pain or swelling increases    Fingers or hand becomes cold, blue, numb, or tingly  Date Last Reviewed: 11/20/2015 2000-2017 [employer]. [address] [place] [address], [place] [address]. All rights reserved. This information is not intended as a substitute for professional medical care. Always follow your healthcare professional's instructions.

## 2018-07-05 ENCOUNTER — HOSPITAL ENCOUNTER (EMERGENCY)
Facility: CLINIC | Age: 63
Discharge: HOME | End: 2018-07-05
Attending: EMERGENCY MEDICINE | Admitting: EMERGENCY MEDICINE
Payer: COMMERCIAL

## 2018-07-05 VITALS
DIASTOLIC BLOOD PRESSURE: 67 MMHG | OXYGEN SATURATION: 99 % | RESPIRATION RATE: 20 BRPM | SYSTOLIC BLOOD PRESSURE: 142 MMHG | TEMPERATURE: 98 F

## 2018-07-05 DIAGNOSIS — T16.1XXA FOREIGN BODY OF RIGHT EAR, INITIAL ENCOUNTER: ICD-10-CM

## 2018-07-05 PROCEDURE — 99283 EMERGENCY DEPT VISIT LOW MDM: CPT

## 2018-07-05 PROCEDURE — 69200 CLEAR OUTER EAR CANAL: CPT | Mod: RT

## 2018-07-05 NOTE — ED AVS SNAPSHOT
Emergency Department    [address] [facility] [address]    Phone:  [phone]    Fax:  [phone]                                       Megan Bettencourt   MRN: [id number]    Department:   Emergency Department   Date of Visit:  7/5/2018           Patient Information     Date Of Birth          1955        Your diagnoses for this visit were:     Foreign body of right ear, initial encounter        You were seen by Ana Cristina Huynh MD.      Follow-up Information     Follow up with Gunnar Sarkar MD. Schedule an appointment as soon as possible for a visit in 2 days.    Specialty:  Family Practice    Contact information:    [facility]  [address]  [facility] [address] [phone]          Follow up with  Emergency Department.    Specialty:  EMERGENCY MEDICINE    Why:  As needed, If symptoms worsen    Contact information:    [address] [facility] [id number] [phone]        Discharge Instructions         Foreign Body: Ear Canal (Removed)    An object has been removed from the ear canal. A foreign body in the ear can lead to irritation. Sometimes this can cause infection in the outer ear canal.  Home care    If prescription eardrops have been given, use these as directed. Do not get water in your ear for the next five days. (Do not go swimming for 5 days.)    You may use acetaminophen or ibuprofen to control pain, unless another pain medicine was prescribed. Note: If you have chronic liver or kidney disease, or if you have ever had a stomach ulcer or gastrointestinal bleeding, talk with your healthcare provider before using these medicines.  Follow-up care  Follow up with your healthcare provider, or as advised.  When to seek medical advice  Call your healthcare provider right away if any of these occur    Ear pain, itching, or discharge    Redness or swelling of the outer ear    Blood or fluid draining from the ear    Persistent hearing  loss    Fever of 100.4 F (38 C) or higher, or as directed by your healthcare provider  Date Last Reviewed: 5/1/2017 2000-2017 [employer] [address] [place] [address], [place] [address]. All rights reserved. This information is not intended as a substitute for professional medical care. Always follow your healthcare professional's instructions.          24 Hour Appointment Hotline       To make an appointment at any [facility] (formerly [facility]), call [phone] ([phone]). If you don't have a family doctor or clinic, we will help you find one. [place] clinics are conveniently located to serve the needs of you and your family.             Review of your medicines      Our records show that you are taking the medicines listed below. If these are incorrect, please call your family doctor or clinic.        Dose / Directions Last dose taken    ADDERALL PO        Refills:  0        oxyCODONE IR 5 MG tablet   Commonly known as:  ROXICODONE   Dose:  5-10 mg   Quantity:  20 tablet        Take 1-2 tablets (5-10 mg) by mouth every 6 hours as needed for pain   Refills:  0        SYNTHROID PO        Refills:  0        venlafaxine 75 MG 24 hr capsule   Commonly known as:  EFFEXOR-XR   Dose:  175 mg   Indication:  Panic Disorder        Take 175 mg by mouth daily   Refills:  0                Orders Needing Specimen Collection     None      Pending Results     No orders found from 7/3/2018 to 7/6/2018.            Pending Culture Results     No orders found from 7/3/2018 to 7/6/2018.            Pending Results Instructions     If you had any lab results that were not finalized at the time of your Discharge, you can call the ED Lab Result RN at [phone]. You will be contacted by this team for any positive Lab results or changes in treatment. The nurses are available 7 days a week from 10A to 6:30P.  You can leave a message 24 hours per day and they will return your call.        Test Results From Your Hospital Stay                Clinical Quality Measure: Blood Pressure Screening     Your blood pressure was checked while you were in the emergency department today. The last reading we obtained was  BP: 142/67 . Please read the guidelines below about what these numbers mean and what you should do about them.  If your systolic blood pressure (the top number) is less than 120 and your diastolic blood pressure (the bottom number) is less than 80, then your blood pressure is normal. There is nothing more that you need to do about it.  If your systolic blood pressure (the top number) is 120-139 or your diastolic blood pressure (the bottom number) is 80-89, your blood pressure may be higher than it should be. You should have your blood pressure rechecked within a year by a primary care provider.  If your systolic blood pressure (the top number) is 140 or greater or your diastolic blood pressure (the bottom number) is 90 or greater, you may have high blood pressure. High blood pressure is treatable, but if left untreated over time it can put you at risk for heart attack, stroke, or kidney failure. You should have your blood pressure rechecked by a primary care provider within the next 4 weeks.  If your provider in the emergency department today gave you specific instructions to follow-up with your doctor or provider even sooner than that, you should follow that instruction and not wait for up to 4 weeks for your follow-up visit.        Thank you for choosing [facility]       Thank you for choosing [facility] for your care. Our goal is always to provide you with excellent care. Hearing back from our patients is one way we can continue to improve our services. Please take a few minutes to complete the written survey that you may receive in the mail after you visit with us. Thank you!        [employer] Information     [employer] lets you send messages to your doctor, view your test results, renew your prescriptions, schedule appointments and more. To  "sign up, go to [email or web address] . Click on \"Log in\" on the left side of the screen, which will take you to the Welcome page. Then click on \"Sign up Now\" on the right side of the page.     You will be asked to enter the access code listed below, as well as some personal information. Please follow the directions to create your username and password.     Your access code is: [email or web address]  Expires: 10/3/2018  8:41 PM     Your access code will  in 90 days. If you need help or a new code, please call your [facility] clinic or [phone].        Care EveryWhere ID     This is your Care EveryWhere ID. This could be used by other organizations to access your [facility] medical records  [id number]        Equal Access to Services     RENITA TREVIZO : Chaya Hilton, edmund tolliver, marry corley, tomás valente. So [facility] [phone].    ATENCIÓN: Si habla español, tiene a lynn disposición servicios gratuitos de asistencia lingüística. Luana al [phone].    We comply with applicable federal civil rights laws and [place] laws. We do not discriminate on the basis of race, color, national origin, age, disability, sex, sexual orientation, or gender identity.            After Visit Summary       This is your record. Keep this with you and show to your community pharmacist(s) and doctor(s) at your next visit.                  "

## 2018-07-05 NOTE — ED AVS SNAPSHOT
Emergency Department    [address] [place] [address]    Phone:  [phone]    Fax:  [phone]                                       Megan Bettencourt   MRN: [id number]    Department:   Emergency Department   Date of Visit:  7/5/2018           After Visit Summary Signature Page     I have received my discharge instructions, and my questions have been answered. I have discussed any challenges I see with this plan with the nurse or doctor.    ..........................................................................................................................................  Patient/Patient Representative Signature      ..........................................................................................................................................  Patient Representative Print Name and Relationship to Patient    ..................................................               ................................................  Date                                            Time    ..........................................................................................................................................  Reviewed by Signature/Title    ...................................................              ..............................................  Date                                                            Time

## 2018-07-06 NOTE — DISCHARGE INSTRUCTIONS
Foreign Body: Ear Canal (Removed)    An object has been removed from the ear canal. A foreign body in the ear can lead to irritation. Sometimes this can cause infection in the outer ear canal.  Home care    If prescription eardrops have been given, use these as directed. Do not get water in your ear for the next five days. (Do not go swimming for 5 days.)    You may use acetaminophen or ibuprofen to control pain, unless another pain medicine was prescribed. Note: If you have chronic liver or kidney disease, or if you have ever had a stomach ulcer or gastrointestinal bleeding, talk with your healthcare provider before using these medicines.  Follow-up care  Follow up with your healthcare provider, or as advised.  When to seek medical advice  Call your healthcare provider right away if any of these occur    Ear pain, itching, or discharge    Redness or swelling of the outer ear    Blood or fluid draining from the ear    Persistent hearing loss    Fever of 100.4 F (38 C) or higher, or as directed by your healthcare provider  Date Last Reviewed: 5/1/2017 2000-2017 [employer] [address] [place] [address]. All rights reserved. This information is not intended as a substitute for professional medical care. Always follow your healthcare professional's instructions.

## 2018-07-06 NOTE — ED PROVIDER NOTES
History     Chief Complaint:  Foreign Body in Right Ear      The history is provided by the patient.      Megan Bettencourt is a 63 year old female who presents to the emergency department for evaluation of foreign body in her right ear. The patient presents in the emergency department for removal of a piece of her hearing aid that became stuck in her ear. The patient denies taking any medication or any other medical problems, but indicates that her ear is sore.     Allergies:  Morphine  Vicodin    Medications:    Adderall  Levothyroxine  Venlafaxine    Past Medical History:    Anxiety  Cervical Stenosis  Thyroid Disease  Visual Disturbance    Past Surgical History:    Breast Surgery   Section  Cosmetic Blepharoplasty  Orthopedic Surgery  Neck Lift  Repair Ptosis Bilateral    Family History:    No past pertinent family history.    Social History:  Marital Status:  Single [1]  Tobacco Use: No  Alcohol Use: No      Review of Systems   HENT: Positive for ear pain.      10 point review of systems performed and is negative except as above and in HPI.      Physical Exam     Patient Vitals for the past 24 hrs:   BP Temp Temp src Heart Rate Resp SpO2   18 142/67 98  F (36.7  C) Oral 100 20 99 %           Physical Exam  General: Resting on the gurney, appears minimally uncomfortable  Head:  The scalp, face, and head appear normal  Ears:  Right ear with a rubber earpiece stuck within the canal. After removal, there is some irritation in the canal, but no evidence of otitis externa. No evidence of injury to the tympanic membrane   Mouth/Throat: Mucus membranes are moist  CV:  Regular rate   Resp:  Non-labored, no retractions or accessory muscle use  Skin:  No rash or lesions noted.  Neuro:  Speech is normal and fluent. No apparent deficit.  Psych:  Awake. Alert.  Normal affect.      Appropriate interactions.      Emergency Department Course   Procedures:  PROCEDURE NOTE: External Auditory Canal Foreign  body    Physician: Ana Cristina Huynh MD  Indications: Otalgia, foreign body in external auditory canal.   Consent: Verbal  Description of Procedure: I used forceps to remove the foreign body from the right ear. I was able to visualize the TM after the foreign body was removed. There was no evidence of injury to the TM.      Emergency Department Course:  2007 Nursing notes and vitals reviewed. I performed an exam of the patient as documented above.     2015 I performed an external auditory canal foreign body removal from the right ear as per the procedure note above    2029 I rechecked the patient and discussed the results of her workup thus far.     Findings and plan explained to the patient. Patient discharged home with instructions regarding supportive care, medications, and reasons to return. The importance of close follow-up was reviewed.    Impression & Plan      Medical Decision Making:  Megan Bettencourt is a 63 year old female who presents for evaluation of a foreign body in the right ear.  This was successfully removed, see above procedure note. No signs of complications of the foreign body including abscess, cellulitis, necrotizing fascitis, penetration of vascular or nerve structures, etc.  Patient is more comfortable after removal. Risk of infection as well as reasons for return to the ER were discussed.        Diagnosis:    ICD-10-CM    1. Foreign body of right ear, initial encounter T16.1XXA        Disposition:  discharged to home    Scribe Disclosure:  I, Lopez Joe, am serving as a scribe on 7/5/2018 at 8:32 PM to personally document services performed by Ana Cristina Huynh MD based on my observations and the provider's statements to me.       Lopez Joe  7/5/2018    EMERGENCY DEPARTMENT       Ana Cristina Huynh MD  07/06/18 7004

## 2018-07-08 ENCOUNTER — APPOINTMENT (OUTPATIENT)
Dept: GENERAL RADIOLOGY | Facility: CLINIC | Age: 63
DRG: 206 | End: 2018-07-08
Attending: EMERGENCY MEDICINE
Payer: COMMERCIAL

## 2018-07-08 ENCOUNTER — HOSPITAL ENCOUNTER (INPATIENT)
Facility: CLINIC | Age: 63
LOS: 2 days | Discharge: HOME | DRG: 206 | End: 2018-07-11
Attending: NURSE PRACTITIONER | Admitting: SURGERY
Payer: COMMERCIAL

## 2018-07-08 DIAGNOSIS — J93.9 PNEUMOTHORAX: ICD-10-CM

## 2018-07-08 DIAGNOSIS — S22.31XA CLOSED FRACTURE OF ONE RIB OF RIGHT SIDE, INITIAL ENCOUNTER: ICD-10-CM

## 2018-07-08 PROBLEM — T07.XXXA BLUNT TRAUMA OF MULTIPLE SITES: Status: ACTIVE | Noted: 2018-07-08

## 2018-07-08 LAB
ANION GAP SERPL CALCULATED.3IONS-SCNC: 8 MMOL/L (ref 3–14)
BASOPHILS # BLD AUTO: 0 10E9/L (ref 0–0.2)
BASOPHILS NFR BLD AUTO: 0.5 %
BUN SERPL-MCNC: 15 MG/DL (ref 7–30)
CALCIUM SERPL-MCNC: 8.5 MG/DL (ref 8.5–10.1)
CHLORIDE SERPL-SCNC: 106 MMOL/L (ref 94–109)
CO2 SERPL-SCNC: 28 MMOL/L (ref 20–32)
CREAT SERPL-MCNC: 0.68 MG/DL (ref 0.52–1.04)
DIFFERENTIAL METHOD BLD: NORMAL
EOSINOPHIL # BLD AUTO: 0.1 10E9/L (ref 0–0.7)
EOSINOPHIL NFR BLD AUTO: 0.8 %
ERYTHROCYTE [DISTWIDTH] IN BLOOD BY AUTOMATED COUNT: 13.4 % (ref 10–15)
GFR SERPL CREATININE-BSD FRML MDRD: 87 ML/MIN/1.7M2
GLUCOSE SERPL-MCNC: 98 MG/DL (ref 70–99)
HCT VFR BLD AUTO: 39 % (ref 35–47)
HGB BLD-MCNC: 13.1 G/DL (ref 11.7–15.7)
IMM GRANULOCYTES # BLD: 0 10E9/L (ref 0–0.4)
IMM GRANULOCYTES NFR BLD: 0.3 %
LYMPHOCYTES # BLD AUTO: 1.1 10E9/L (ref 0.8–5.3)
LYMPHOCYTES NFR BLD AUTO: 17.7 %
MCH RBC QN AUTO: 32.1 PG (ref 26.5–33)
MCHC RBC AUTO-ENTMCNC: 33.6 G/DL (ref 31.5–36.5)
MCV RBC AUTO: 96 FL (ref 78–100)
MONOCYTES # BLD AUTO: 0.8 10E9/L (ref 0–1.3)
MONOCYTES NFR BLD AUTO: 13.1 %
NEUTROPHILS # BLD AUTO: 4.3 10E9/L (ref 1.6–8.3)
NEUTROPHILS NFR BLD AUTO: 67.6 %
NRBC # BLD AUTO: 0 10*3/UL
NRBC BLD AUTO-RTO: 0 /100
PLATELET # BLD AUTO: 251 10E9/L (ref 150–450)
POTASSIUM SERPL-SCNC: 3.8 MMOL/L (ref 3.4–5.3)
RBC # BLD AUTO: 4.08 10E12/L (ref 3.8–5.2)
SODIUM SERPL-SCNC: 142 MMOL/L (ref 133–144)
WBC # BLD AUTO: 6.3 10E9/L (ref 4–11)

## 2018-07-08 PROCEDURE — 25000132 ZZH RX MED GY IP 250 OP 250 PS 637: Performed by: SURGERY

## 2018-07-08 PROCEDURE — 80048 BASIC METABOLIC PNL TOTAL CA: CPT | Performed by: EMERGENCY MEDICINE

## 2018-07-08 PROCEDURE — 71046 X-RAY EXAM CHEST 2 VIEWS: CPT

## 2018-07-08 PROCEDURE — 96376 TX/PRO/DX INJ SAME DRUG ADON: CPT

## 2018-07-08 PROCEDURE — 25000128 H RX IP 250 OP 636: Performed by: EMERGENCY MEDICINE

## 2018-07-08 PROCEDURE — G0378 HOSPITAL OBSERVATION PER HR: HCPCS

## 2018-07-08 PROCEDURE — 25800025 ZZH RX 258: Performed by: SURGERY

## 2018-07-08 PROCEDURE — 99285 EMERGENCY DEPT VISIT HI MDM: CPT | Mod: 25

## 2018-07-08 PROCEDURE — 96375 TX/PRO/DX INJ NEW DRUG ADDON: CPT

## 2018-07-08 PROCEDURE — 96361 HYDRATE IV INFUSION ADD-ON: CPT

## 2018-07-08 PROCEDURE — 99221 1ST HOSP IP/OBS SF/LOW 40: CPT | Performed by: SURGERY

## 2018-07-08 PROCEDURE — 85025 COMPLETE CBC W/AUTO DIFF WBC: CPT | Performed by: EMERGENCY MEDICINE

## 2018-07-08 PROCEDURE — 96374 THER/PROPH/DIAG INJ IV PUSH: CPT

## 2018-07-08 RX ORDER — FENTANYL CITRATE 50 UG/ML
50 INJECTION, SOLUTION INTRAMUSCULAR; INTRAVENOUS ONCE
Status: COMPLETED | OUTPATIENT
Start: 2018-07-08 | End: 2018-07-08

## 2018-07-08 RX ORDER — ACETAMINOPHEN 650 MG/1
650 SUPPOSITORY RECTAL EVERY 4 HOURS PRN
Status: DISCONTINUED | OUTPATIENT
Start: 2018-07-08 | End: 2018-07-11 | Stop reason: HOSPADM

## 2018-07-08 RX ORDER — IBUPROFEN 600 MG/1
600 TABLET, FILM COATED ORAL EVERY 6 HOURS PRN
Status: DISCONTINUED | OUTPATIENT
Start: 2018-07-08 | End: 2018-07-11 | Stop reason: HOSPADM

## 2018-07-08 RX ORDER — VENLAFAXINE HYDROCHLORIDE 150 MG/1
150 CAPSULE, EXTENDED RELEASE ORAL DAILY
Status: ON HOLD | COMMUNITY
End: 2020-01-01

## 2018-07-08 RX ORDER — HYDROMORPHONE HYDROCHLORIDE 1 MG/ML
0.5 INJECTION, SOLUTION INTRAMUSCULAR; INTRAVENOUS; SUBCUTANEOUS EVERY 30 MIN PRN
Status: DISCONTINUED | OUTPATIENT
Start: 2018-07-08 | End: 2018-07-09

## 2018-07-08 RX ORDER — HYDROMORPHONE HYDROCHLORIDE 1 MG/ML
0.3 INJECTION, SOLUTION INTRAMUSCULAR; INTRAVENOUS; SUBCUTANEOUS
Status: DISCONTINUED | OUTPATIENT
Start: 2018-07-08 | End: 2018-07-11 | Stop reason: HOSPADM

## 2018-07-08 RX ORDER — PROCHLORPERAZINE MALEATE 5 MG
10 TABLET ORAL EVERY 6 HOURS PRN
Status: DISCONTINUED | OUTPATIENT
Start: 2018-07-08 | End: 2018-07-11 | Stop reason: HOSPADM

## 2018-07-08 RX ORDER — DEXTROSE MONOHYDRATE, SODIUM CHLORIDE, AND POTASSIUM CHLORIDE 50; 1.49; 4.5 G/1000ML; G/1000ML; G/1000ML
INJECTION, SOLUTION INTRAVENOUS CONTINUOUS
Status: DISCONTINUED | OUTPATIENT
Start: 2018-07-08 | End: 2018-07-11

## 2018-07-08 RX ORDER — OXYCODONE AND ACETAMINOPHEN 5; 325 MG/1; MG/1
1-2 TABLET ORAL EVERY 4 HOURS PRN
Status: DISCONTINUED | OUTPATIENT
Start: 2018-07-08 | End: 2018-07-11 | Stop reason: HOSPADM

## 2018-07-08 RX ORDER — ONDANSETRON 2 MG/ML
4 INJECTION INTRAMUSCULAR; INTRAVENOUS EVERY 6 HOURS PRN
Status: DISCONTINUED | OUTPATIENT
Start: 2018-07-08 | End: 2018-07-11 | Stop reason: HOSPADM

## 2018-07-08 RX ORDER — PROCHLORPERAZINE 25 MG
25 SUPPOSITORY, RECTAL RECTAL EVERY 12 HOURS PRN
Status: DISCONTINUED | OUTPATIENT
Start: 2018-07-08 | End: 2018-07-11 | Stop reason: HOSPADM

## 2018-07-08 RX ORDER — DIPHENHYDRAMINE HYDROCHLORIDE 50 MG/ML
25 INJECTION INTRAMUSCULAR; INTRAVENOUS ONCE
Status: COMPLETED | OUTPATIENT
Start: 2018-07-08 | End: 2018-07-08

## 2018-07-08 RX ORDER — ONDANSETRON 4 MG/1
4 TABLET, ORALLY DISINTEGRATING ORAL EVERY 6 HOURS PRN
Status: DISCONTINUED | OUTPATIENT
Start: 2018-07-08 | End: 2018-07-11 | Stop reason: HOSPADM

## 2018-07-08 RX ORDER — ACETAMINOPHEN 325 MG/1
650 TABLET ORAL EVERY 4 HOURS PRN
Status: DISCONTINUED | OUTPATIENT
Start: 2018-07-08 | End: 2018-07-11 | Stop reason: HOSPADM

## 2018-07-08 RX ORDER — HYDROCODONE BITARTRATE AND ACETAMINOPHEN 5; 325 MG/1; MG/1
1 TABLET ORAL ONCE
Status: DISCONTINUED | OUTPATIENT
Start: 2018-07-08 | End: 2018-07-09

## 2018-07-08 RX ORDER — NALOXONE HYDROCHLORIDE 0.4 MG/ML
.1-.4 INJECTION, SOLUTION INTRAMUSCULAR; INTRAVENOUS; SUBCUTANEOUS
Status: DISCONTINUED | OUTPATIENT
Start: 2018-07-08 | End: 2018-07-11 | Stop reason: HOSPADM

## 2018-07-08 RX ADMIN — IBUPROFEN 600 MG: 600 TABLET ORAL at 23:48

## 2018-07-08 RX ADMIN — Medication 0.5 MG: at 18:43

## 2018-07-08 RX ADMIN — FENTANYL CITRATE 50 MCG: 50 INJECTION INTRAMUSCULAR; INTRAVENOUS at 17:50

## 2018-07-08 RX ADMIN — POTASSIUM CHLORIDE, DEXTROSE MONOHYDRATE AND SODIUM CHLORIDE: 150; 5; 450 INJECTION, SOLUTION INTRAVENOUS at 20:13

## 2018-07-08 RX ADMIN — DIPHENHYDRAMINE HYDROCHLORIDE 25 MG: 50 INJECTION, SOLUTION INTRAMUSCULAR; INTRAVENOUS at 17:57

## 2018-07-08 RX ADMIN — Medication 0.5 MG: at 21:06

## 2018-07-08 RX ADMIN — SODIUM CHLORIDE 1000 ML: 9 INJECTION, SOLUTION INTRAVENOUS at 17:50

## 2018-07-08 ASSESSMENT — ENCOUNTER SYMPTOMS: SHORTNESS OF BREATH: 1

## 2018-07-08 NOTE — IP AVS SNAPSHOT
[facility] [address] [facility]    [address] [place] [address]    Phone:  [phone]                                       After Visit Summary   7/8/2018    Megan Bettencourt    MRN: [id number]           After Visit Summary Signature Page     I have received my discharge instructions, and my questions have been answered. I have discussed any challenges I see with this plan with the nurse or doctor.    ..........................................................................................................................................  Patient/Patient Representative Signature      ..........................................................................................................................................  Patient Representative Print Name and Relationship to Patient    ..................................................               ................................................  Date                                            Time    ..........................................................................................................................................  Reviewed by Signature/Title    ...................................................              ..............................................  Date                                                            Time
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
## 2018-07-08 NOTE — PROGRESS NOTES
RECEIVING UNIT ED HANDOFF REVIEW    ED Nurse Handoff Report was reviewed by: Bety Escalante on July 8, 2018 at 6:33 PM

## 2018-07-08 NOTE — PHARMACY-ADMISSION MEDICATION HISTORY
Admission medication history interview status for the 7/8/2018  admission is complete. See EPIC admission navigator for prior to admission medications     Medication history source reliability:Good    Actions taken by pharmacist (provider contacted, etc):None     Additional medication history information not noted on PTA med list :None    Medication reconciliation/reorder completed by provider prior to medication history? No    Time spent in this activity: 10 minutes    Prior to Admission medications    Medication Sig Last Dose Taking? Auth Provider   Amphetamine-Dextroamphetamine (ADDERALL PO) Take 10 mg by mouth daily  7/8/2018 at am Yes Reported, Patient   Levothyroxine Sodium (SYNTHROID PO) Take 50 mcg by mouth daily  7/8/2018 at am Yes Reported, Patient   venlafaxine (EFFEXOR-XR) 150 MG 24 hr capsule Take 150 mg by mouth daily 7/8/2018 at am Yes Unknown, Entered By History

## 2018-07-09 ENCOUNTER — APPOINTMENT (OUTPATIENT)
Dept: GENERAL RADIOLOGY | Facility: CLINIC | Age: 63
DRG: 206 | End: 2018-07-09
Attending: SURGERY
Payer: COMMERCIAL

## 2018-07-09 ENCOUNTER — APPOINTMENT (OUTPATIENT)
Dept: INTERVENTIONAL RADIOLOGY/VASCULAR | Facility: CLINIC | Age: 63
DRG: 206 | End: 2018-07-09
Attending: SURGERY
Payer: COMMERCIAL

## 2018-07-09 PROBLEM — S27.0XXA PNEUMOTHORAX, CLOSED, TRAUMATIC, INITIAL ENCOUNTER: Status: ACTIVE | Noted: 2018-07-09

## 2018-07-09 LAB — INR PPP: 1.03 (ref 0.86–1.14)

## 2018-07-09 PROCEDURE — 32557 INSERT CATH PLEURA W/ IMAGE: CPT | Mod: RT

## 2018-07-09 PROCEDURE — 99232 SBSQ HOSP IP/OBS MODERATE 35: CPT | Performed by: SURGERY

## 2018-07-09 PROCEDURE — 25000128 H RX IP 250 OP 636: Performed by: RADIOLOGY

## 2018-07-09 PROCEDURE — 27211039 ZZH NEEDLE CR2

## 2018-07-09 PROCEDURE — 25800025 ZZH RX 258: Performed by: SURGERY

## 2018-07-09 PROCEDURE — G0378 HOSPITAL OBSERVATION PER HR: HCPCS

## 2018-07-09 PROCEDURE — C1729 CATH, DRAINAGE: HCPCS

## 2018-07-09 PROCEDURE — C1769 GUIDE WIRE: HCPCS

## 2018-07-09 PROCEDURE — 85610 PROTHROMBIN TIME: CPT | Performed by: SURGERY

## 2018-07-09 PROCEDURE — 25000128 H RX IP 250 OP 636

## 2018-07-09 PROCEDURE — 0W9930Z DRAINAGE OF RIGHT PLEURAL CAVITY WITH DRAINAGE DEVICE, PERCUTANEOUS APPROACH: ICD-10-PCS | Performed by: RADIOLOGY

## 2018-07-09 PROCEDURE — 71046 X-RAY EXAM CHEST 2 VIEWS: CPT

## 2018-07-09 PROCEDURE — 25000132 ZZH RX MED GY IP 250 OP 250 PS 637: Performed by: SURGERY

## 2018-07-09 PROCEDURE — 27210905 ZZH KIT CR7

## 2018-07-09 PROCEDURE — 25000125 ZZHC RX 250

## 2018-07-09 PROCEDURE — 12000007 ZZH R&B INTERMEDIATE

## 2018-07-09 PROCEDURE — 36415 COLL VENOUS BLD VENIPUNCTURE: CPT | Performed by: SURGERY

## 2018-07-09 PROCEDURE — 27210885 ZZH ACCESSORY CR4

## 2018-07-09 RX ORDER — FENTANYL CITRATE 50 UG/ML
25-50 INJECTION, SOLUTION INTRAMUSCULAR; INTRAVENOUS EVERY 5 MIN PRN
Status: DISCONTINUED | OUTPATIENT
Start: 2018-07-09 | End: 2018-07-09 | Stop reason: HOSPADM

## 2018-07-09 RX ORDER — FENTANYL CITRATE 50 UG/ML
INJECTION, SOLUTION INTRAMUSCULAR; INTRAVENOUS
Status: COMPLETED
Start: 2018-07-09 | End: 2018-07-09

## 2018-07-09 RX ORDER — LIDOCAINE HYDROCHLORIDE 10 MG/ML
INJECTION, SOLUTION INFILTRATION; PERINEURAL
Status: DISCONTINUED
Start: 2018-07-09 | End: 2018-07-09 | Stop reason: HOSPADM

## 2018-07-09 RX ORDER — LIDOCAINE HYDROCHLORIDE 10 MG/ML
1-30 INJECTION, SOLUTION EPIDURAL; INFILTRATION; INTRACAUDAL; PERINEURAL
Status: COMPLETED | OUTPATIENT
Start: 2018-07-09 | End: 2018-07-09

## 2018-07-09 RX ORDER — NALOXONE HYDROCHLORIDE 0.4 MG/ML
.1-.4 INJECTION, SOLUTION INTRAMUSCULAR; INTRAVENOUS; SUBCUTANEOUS
Status: DISCONTINUED | OUTPATIENT
Start: 2018-07-09 | End: 2018-07-09 | Stop reason: HOSPADM

## 2018-07-09 RX ORDER — FLUMAZENIL 0.1 MG/ML
0.2 INJECTION, SOLUTION INTRAVENOUS
Status: DISCONTINUED | OUTPATIENT
Start: 2018-07-09 | End: 2018-07-09 | Stop reason: HOSPADM

## 2018-07-09 RX ORDER — FENTANYL CITRATE 50 UG/ML
INJECTION, SOLUTION INTRAMUSCULAR; INTRAVENOUS
Status: DISCONTINUED
Start: 2018-07-09 | End: 2018-07-09 | Stop reason: HOSPADM

## 2018-07-09 RX ADMIN — FENTANYL CITRATE 50 MCG: 50 INJECTION INTRAMUSCULAR; INTRAVENOUS at 13:13

## 2018-07-09 RX ADMIN — FENTANYL CITRATE 50 MCG: 50 INJECTION INTRAMUSCULAR; INTRAVENOUS at 13:32

## 2018-07-09 RX ADMIN — MIDAZOLAM HYDROCHLORIDE 1 MG: 1 INJECTION, SOLUTION INTRAMUSCULAR; INTRAVENOUS at 13:13

## 2018-07-09 RX ADMIN — OXYCODONE HYDROCHLORIDE AND ACETAMINOPHEN 2 TABLET: 5; 325 TABLET ORAL at 19:07

## 2018-07-09 RX ADMIN — LIDOCAINE HYDROCHLORIDE 10 ML: 10 INJECTION, SOLUTION EPIDURAL; INFILTRATION; INTRACAUDAL; PERINEURAL at 13:32

## 2018-07-09 RX ADMIN — IBUPROFEN 600 MG: 600 TABLET ORAL at 17:45

## 2018-07-09 RX ADMIN — POTASSIUM CHLORIDE, DEXTROSE MONOHYDRATE AND SODIUM CHLORIDE: 150; 5; 450 INJECTION, SOLUTION INTRAVENOUS at 05:47

## 2018-07-09 RX ADMIN — FENTANYL CITRATE 50 MCG: 50 INJECTION INTRAMUSCULAR; INTRAVENOUS at 13:19

## 2018-07-09 RX ADMIN — MIDAZOLAM HYDROCHLORIDE 1 MG: 1 INJECTION, SOLUTION INTRAMUSCULAR; INTRAVENOUS at 13:19

## 2018-07-09 RX ADMIN — OXYCODONE HYDROCHLORIDE AND ACETAMINOPHEN 2 TABLET: 5; 325 TABLET ORAL at 14:54

## 2018-07-09 RX ADMIN — LIDOCAINE HYDROCHLORIDE 10 ML: 10 INJECTION, SOLUTION INFILTRATION; PERINEURAL at 13:32

## 2018-07-09 RX ADMIN — IBUPROFEN 600 MG: 600 TABLET ORAL at 07:49

## 2018-07-09 RX ADMIN — OXYCODONE HYDROCHLORIDE AND ACETAMINOPHEN 1 TABLET: 5; 325 TABLET ORAL at 10:10

## 2018-07-09 ASSESSMENT — ACTIVITIES OF DAILY LIVING (ADL)
BATHING: 0-->INDEPENDENT
TOILETING: 0-->INDEPENDENT
SWALLOWING: 0-->SWALLOWS FOODS/LIQUIDS WITHOUT DIFFICULTY
RETIRED_EATING: 0-->INDEPENDENT
RETIRED_COMMUNICATION: 0-->UNDERSTANDS/COMMUNICATES WITHOUT DIFFICULTY
TRANSFERRING: 0-->INDEPENDENT
DRESS: 0-->INDEPENDENT
COGNITION: 0 - NO COGNITION ISSUES REPORTED
FALL_HISTORY_WITHIN_LAST_SIX_MONTHS: YES
AMBULATION: 0-->INDEPENDENT
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
## 2018-07-09 NOTE — PROGRESS NOTES
Interventional Radiology Intra-procedural Nursing Note    Patient Name: Megan Bettencourt  Medical Record Number: [id number]  Today's Date: July 9, 2018    Start Time: 1313  End of procedure time: 1335  Procedure: Right Chest tube placement  Report given to: IMMANUEL [place] [address]  Time pt departs:  1345    Other Notes: Patient in IR2 for chest tube placement for pneumothorax. Vital signs stable on arrival, 2L NC, SpO2 96%. 10F chest tube placed in R anterior chest, midclavicular line. Chest tube to -20 mm Hg suction. Tube sutured in place, gauze dressing C/D/I. 150 mcg fentanyl and 2 mg midazolam given for procedure. Patient tolerated tube placement, vital signs remained stable.     Julissa Cannon RN
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
## 2018-07-09 NOTE — PROGRESS NOTES
Interventional Radiology Pre-Procedure Sedation Assessment   Time of Assessment: 12:34 PM    Expected Level: Moderate Sedation    Indication: Sedation is required for the following type of Procedure: Right chest tube placement    Sedation and procedural consent: Risks, benefits and alternatives were discussed with Patient, Dr Alice MANE Intake: Appropriately NPO for procedure    ASA Class: Class 1 - HEALTHY PATIENT    Mallampati: Grade 1:  Soft palate, uvula, tonsillar pillars, and posterior pharyngeal wall visible    Lungs: Clear on Left to auscultation. Right bronchovesicular in base    Heart: Normal heart sounds and rate    History and physical reviewed and no updates needed. I have reviewed the lab findings, diagnostic data, medications, and the plan for sedation. I have determined this patient to be an appropriate candidate for the planned sedation and procedure and have reassessed the patient IMMEDIATELY PRIOR to sedation and procedure.    Portia Paul, ELVIRA CNP

## 2018-07-10 ENCOUNTER — APPOINTMENT (OUTPATIENT)
Dept: GENERAL RADIOLOGY | Facility: CLINIC | Age: 63
DRG: 206 | End: 2018-07-10
Attending: RADIOLOGY
Payer: COMMERCIAL

## 2018-07-10 PROCEDURE — 25000132 ZZH RX MED GY IP 250 OP 250 PS 637: Performed by: SURGERY

## 2018-07-10 PROCEDURE — 12000007 ZZH R&B INTERMEDIATE

## 2018-07-10 PROCEDURE — 25000132 ZZH RX MED GY IP 250 OP 250 PS 637: Performed by: INTERNAL MEDICINE

## 2018-07-10 PROCEDURE — 99231 SBSQ HOSP IP/OBS SF/LOW 25: CPT | Performed by: SURGERY

## 2018-07-10 PROCEDURE — 71046 X-RAY EXAM CHEST 2 VIEWS: CPT

## 2018-07-10 RX ORDER — VENLAFAXINE HYDROCHLORIDE 150 MG/1
150 CAPSULE, EXTENDED RELEASE ORAL
Status: DISCONTINUED | OUTPATIENT
Start: 2018-07-10 | End: 2018-07-11 | Stop reason: HOSPADM

## 2018-07-10 RX ORDER — LEVOTHYROXINE SODIUM 50 UG/1
50 TABLET ORAL
Status: DISCONTINUED | OUTPATIENT
Start: 2018-07-10 | End: 2018-07-11 | Stop reason: HOSPADM

## 2018-07-10 RX ORDER — DIPHENHYDRAMINE HCL 25 MG
25 CAPSULE ORAL EVERY 6 HOURS PRN
Status: DISCONTINUED | OUTPATIENT
Start: 2018-07-10 | End: 2018-07-11 | Stop reason: HOSPADM

## 2018-07-10 RX ADMIN — LEVOTHYROXINE SODIUM 50 MCG: 50 TABLET ORAL at 11:48

## 2018-07-10 RX ADMIN — OXYCODONE HYDROCHLORIDE AND ACETAMINOPHEN 2 TABLET: 5; 325 TABLET ORAL at 20:42

## 2018-07-10 RX ADMIN — IBUPROFEN 600 MG: 600 TABLET ORAL at 20:42

## 2018-07-10 RX ADMIN — VENLAFAXINE HYDROCHLORIDE 150 MG: 150 CAPSULE, EXTENDED RELEASE ORAL at 11:48

## 2018-07-10 RX ADMIN — DIPHENHYDRAMINE HYDROCHLORIDE 25 MG: 25 CAPSULE ORAL at 00:33

## 2018-07-10 RX ADMIN — IBUPROFEN 600 MG: 600 TABLET ORAL at 00:33

## 2018-07-10 RX ADMIN — IBUPROFEN 600 MG: 600 TABLET ORAL at 06:38

## 2018-07-10 RX ADMIN — DIPHENHYDRAMINE HYDROCHLORIDE 25 MG: 25 CAPSULE ORAL at 20:43

## 2018-07-10 RX ADMIN — IBUPROFEN 600 MG: 600 TABLET ORAL at 12:21

## 2018-07-10 RX ADMIN — DIPHENHYDRAMINE HYDROCHLORIDE 25 MG: 25 CAPSULE ORAL at 12:21

## 2018-07-10 RX ADMIN — OXYCODONE HYDROCHLORIDE AND ACETAMINOPHEN 2 TABLET: 5; 325 TABLET ORAL at 12:21

## 2018-07-10 RX ADMIN — OXYCODONE HYDROCHLORIDE AND ACETAMINOPHEN 2 TABLET: 5; 325 TABLET ORAL at 00:33

## 2018-07-10 RX ADMIN — OXYCODONE HYDROCHLORIDE AND ACETAMINOPHEN 2 TABLET: 5; 325 TABLET ORAL at 06:39

## 2018-07-10 RX ADMIN — DIPHENHYDRAMINE HYDROCHLORIDE 25 MG: 25 CAPSULE ORAL at 06:38

## 2018-07-10 NOTE — PROGRESS NOTES
Hospitalist service cross-cover note:     Paged by RN for itching with narcotics. Upon chart review, noted hospitalists have not been formally involved with this patient, however reviewed chart and appears she has history of itching with narcotics previously and also required diphenhydramine pre-treatment in ED. Ordered diphenhydramine 25 mg q6H PRN as courtesy.     Haseeb Wolf MD   Hospitalist  [phone]
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
## 2018-07-11 ENCOUNTER — APPOINTMENT (OUTPATIENT)
Dept: GENERAL RADIOLOGY | Facility: CLINIC | Age: 63
DRG: 206 | End: 2018-07-11
Attending: SURGERY
Payer: COMMERCIAL

## 2018-07-11 VITALS
HEART RATE: 82 BPM | HEIGHT: 68 IN | SYSTOLIC BLOOD PRESSURE: 141 MMHG | OXYGEN SATURATION: 99 % | TEMPERATURE: 97.9 F | DIASTOLIC BLOOD PRESSURE: 80 MMHG | RESPIRATION RATE: 16 BRPM | WEIGHT: 132 LBS | BODY MASS INDEX: 20 KG/M2

## 2018-07-11 PROCEDURE — 25000132 ZZH RX MED GY IP 250 OP 250 PS 637: Performed by: SURGERY

## 2018-07-11 PROCEDURE — 25000132 ZZH RX MED GY IP 250 OP 250 PS 637: Performed by: INTERNAL MEDICINE

## 2018-07-11 PROCEDURE — 71045 X-RAY EXAM CHEST 1 VIEW: CPT

## 2018-07-11 PROCEDURE — 40000986 XR CHEST PORT 1 VW

## 2018-07-11 RX ORDER — LEVOTHYROXINE SODIUM 50 UG/1
50 TABLET ORAL DAILY
Status: DISCONTINUED | OUTPATIENT
Start: 2018-07-11 | End: 2018-07-11

## 2018-07-11 RX ORDER — OXYCODONE AND ACETAMINOPHEN 5; 325 MG/1; MG/1
1-2 TABLET ORAL EVERY 4 HOURS PRN
Qty: 12 TABLET | Refills: 0 | Status: ON HOLD | OUTPATIENT
Start: 2018-07-11 | End: 2019-02-07

## 2018-07-11 RX ORDER — GINSENG 100 MG
CAPSULE ORAL
Status: DISCONTINUED | OUTPATIENT
Start: 2018-07-11 | End: 2018-07-11 | Stop reason: HOSPADM

## 2018-07-11 RX ORDER — DEXTROAMPHETAMINE SACCHARATE, AMPHETAMINE ASPARTATE, DEXTROAMPHETAMINE SULFATE AND AMPHETAMINE SULFATE 2.5; 2.5; 2.5; 2.5 MG/1; MG/1; MG/1; MG/1
10 TABLET ORAL DAILY
Status: DISCONTINUED | OUTPATIENT
Start: 2018-07-11 | End: 2018-07-11 | Stop reason: HOSPADM

## 2018-07-11 RX ORDER — FLUMAZENIL 0.1 MG/ML
0.2 INJECTION, SOLUTION INTRAVENOUS
Status: DISCONTINUED | OUTPATIENT
Start: 2018-07-11 | End: 2018-07-11

## 2018-07-11 RX ORDER — FENTANYL CITRATE 50 UG/ML
25-50 INJECTION, SOLUTION INTRAMUSCULAR; INTRAVENOUS EVERY 5 MIN PRN
Status: DISCONTINUED | OUTPATIENT
Start: 2018-07-11 | End: 2018-07-11

## 2018-07-11 RX ORDER — VENLAFAXINE HYDROCHLORIDE 150 MG/1
150 CAPSULE, EXTENDED RELEASE ORAL DAILY
Status: DISCONTINUED | OUTPATIENT
Start: 2018-07-11 | End: 2018-07-11

## 2018-07-11 RX ORDER — LIDOCAINE HYDROCHLORIDE 10 MG/ML
1-30 INJECTION, SOLUTION EPIDURAL; INFILTRATION; INTRACAUDAL; PERINEURAL
Status: DISCONTINUED | OUTPATIENT
Start: 2018-07-11 | End: 2018-07-11

## 2018-07-11 RX ORDER — NALOXONE HYDROCHLORIDE 0.4 MG/ML
.1-.4 INJECTION, SOLUTION INTRAMUSCULAR; INTRAVENOUS; SUBCUTANEOUS
Status: DISCONTINUED | OUTPATIENT
Start: 2018-07-11 | End: 2018-07-11

## 2018-07-11 RX ADMIN — VENLAFAXINE HYDROCHLORIDE 150 MG: 150 CAPSULE, EXTENDED RELEASE ORAL at 08:39

## 2018-07-11 RX ADMIN — IBUPROFEN 600 MG: 600 TABLET ORAL at 14:41

## 2018-07-11 RX ADMIN — OXYCODONE HYDROCHLORIDE AND ACETAMINOPHEN 2 TABLET: 5; 325 TABLET ORAL at 10:54

## 2018-07-11 RX ADMIN — DIPHENHYDRAMINE HYDROCHLORIDE 25 MG: 25 CAPSULE ORAL at 11:22

## 2018-07-11 RX ADMIN — OXYCODONE HYDROCHLORIDE AND ACETAMINOPHEN 2 TABLET: 5; 325 TABLET ORAL at 02:13

## 2018-07-11 RX ADMIN — LEVOTHYROXINE SODIUM 50 MCG: 50 TABLET ORAL at 05:40

## 2018-07-11 RX ADMIN — DIPHENHYDRAMINE HYDROCHLORIDE 25 MG: 25 CAPSULE ORAL at 02:13

## 2018-07-11 RX ADMIN — IBUPROFEN 600 MG: 600 TABLET ORAL at 05:34

## 2018-07-11 NOTE — PROGRESS NOTES
Surgery  CXR following chest tube removal looks stable, small apical PTX.  Okay to discharge.  Aries Peterson MD  General Surgery, Office [phone]

## 2018-07-11 NOTE — PROGRESS NOTES
Doing well  Breathing easy  No airleak and cxr unchanged after 24 hrs of water seal  Will remove chest tube today and check cxr, if ok home later today

## 2018-07-15 ENCOUNTER — HOSPITAL ENCOUNTER (EMERGENCY)
Facility: CLINIC | Age: 63
Discharge: HOME | End: 2018-07-15
Attending: EMERGENCY MEDICINE | Admitting: EMERGENCY MEDICINE
Payer: COMMERCIAL

## 2018-07-15 VITALS
BODY MASS INDEX: 19.7 KG/M2 | HEIGHT: 68 IN | TEMPERATURE: 98.2 F | DIASTOLIC BLOOD PRESSURE: 80 MMHG | SYSTOLIC BLOOD PRESSURE: 128 MMHG | OXYGEN SATURATION: 99 % | WEIGHT: 130 LBS | RESPIRATION RATE: 16 BRPM

## 2018-07-15 DIAGNOSIS — K60.2 ANAL FISSURE: ICD-10-CM

## 2018-07-15 LAB
ALBUMIN UR-MCNC: NEGATIVE MG/DL
APPEARANCE UR: CLEAR
BACTERIA #/AREA URNS HPF: ABNORMAL /HPF
BILIRUB UR QL STRIP: NEGATIVE
COLOR UR AUTO: YELLOW
GLUCOSE UR STRIP-MCNC: NEGATIVE MG/DL
HGB UR QL STRIP: ABNORMAL
KETONES UR STRIP-MCNC: NEGATIVE MG/DL
LEUKOCYTE ESTERASE UR QL STRIP: NEGATIVE
NITRATE UR QL: NEGATIVE
PH UR STRIP: 5.5 PH (ref 5–7)
RBC #/AREA URNS AUTO: 0 /HPF (ref 0–2)
SOURCE: ABNORMAL
SP GR UR STRIP: <1.005 (ref 1–1.03)
UROBILINOGEN UR STRIP-MCNC: 0.2 MG/DL (ref 0–2)
WBC #/AREA URNS AUTO: 0 /HPF (ref 0–5)

## 2018-07-15 PROCEDURE — 81001 URINALYSIS AUTO W/SCOPE: CPT | Performed by: EMERGENCY MEDICINE

## 2018-07-15 PROCEDURE — 99283 EMERGENCY DEPT VISIT LOW MDM: CPT

## 2018-07-15 ASSESSMENT — ENCOUNTER SYMPTOMS
FEVER: 0
ARTHRALGIAS: 0
LIGHT-HEADEDNESS: 0
ABDOMINAL PAIN: 0
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
## 2018-07-15 NOTE — ED AVS SNAPSHOT
Emergency Department    [address] [facility] [address]    Phone:  [phone]    Fax:  [phone]                                       Megan Bettencourt   MRN: [id number]    Department:   Emergency Department   Date of Visit:  7/15/2018           Patient Information     Date Of Birth          1955        Your diagnoses for this visit were:     Anal fissure        You were seen by Garcia Joseph MD.      Follow-up Information     Follow up with Gunnar Sarkar MD.    Specialty:  Family Practice    Contact information:    [facility]  [address] [place] [address] [phone]          Discharge Instructions         Lower GI Bleeding (Stable)  You have signs of blood in your stool. This is called rectal bleeding. The bleeding may have begun in another part of your gastrointestinal (GI) tract. If the blood is bright red, it is likely coming from the lower part of the GI tract. If the blood is black or dark, it might be coming from higher up in the GI tract. Very small amounts of GI bleeding may not be visible and can only be discovered during a test on your stool. Possible causes of lower GI bleeding include:    Hemorrhoids    Anal fissures    Diverticulitis    Inflammatory bowel disease (Crohn's disease or ulcerative colitis)    Polyps (growths) in the intestine  Note: Iron supplements and medicines for diarrhea or upset stomach can cause black stools. Foods such as licorice and red beets can also discolor the stool and be mistaken for bleeding. These are not bleeding and are not a cause for alarm.  Home care  You have not lost a large amount of blood and your condition appears stable at this time. You may resume normal activity as long as you feel well.  Don't take NSAIDs, such as aspirin, ibuprofen, or naproxen. They can irritate the stomach and cause further bleeding. If you are taking these medicines for other medical reasons, talk to your healthcare  provider before you stop them.   Follow-up care  Follow up with your healthcare provider as advised. Further tests may be needed to find the cause of your bleeding.  When to seek medical advice  Call your healthcare provider right away for any of the following:    Large amount of rectal bleeding     Increasing abdominal pain    Weakness, dizziness  Call 911  Call 911 if any of the following occur:    Loss of consciousness    Vomiting blood  Date Last Reviewed: 6/24/2015 2000-2017 [employer]. [address] [place] [address], [place] [id number]. All rights reserved. This information is not intended as a substitute for professional medical care. Always follow your healthcare professional's instructions.          24 Hour Appointment Hotline       To make an appointment at any [facility], call [phone] ([phone]). If you don't have a family doctor or clinic, we will help you find one. [facility] clinics are conveniently located to serve the needs of you and your family.             Review of your medicines      Our records show that you are taking the medicines listed below. If these are incorrect, please call your family doctor or clinic.        Dose / Directions Last dose taken    ADDERALL PO   Dose:  10 mg        Take 10 mg by mouth daily   Refills:  0        oxyCODONE-acetaminophen 5-325 MG per tablet   Commonly known as:  PERCOCET   Dose:  1-2 tablet   Quantity:  12 tablet        Take 1-2 tablets by mouth every 4 hours as needed for other (pain control or improvement in physical function.)   Refills:  0        SYNTHROID PO   Dose:  50 mcg        Take 50 mcg by mouth daily   Refills:  0        venlafaxine 150 MG 24 hr capsule   Commonly known as:  EFFEXOR-XR   Dose:  150 mg        Take 150 mg by mouth daily   Refills:  0                Procedures and tests performed during your visit     UA with Microscopic      Orders Needing Specimen Collection     None      Pending Results     No orders  found from 7/13/2018 to 7/16/2018.            Pending Culture Results     No orders found from 7/13/2018 to 7/16/2018.            Pending Results Instructions     If you had any lab results that were not finalized at the time of your Discharge, you can call the ED Lab Result RN at [phone]. You will be contacted by this team for any positive Lab results or changes in treatment. The nurses are available 7 days a week from 10A to 6:30P.  You can leave a message 24 hours per day and they will return your call.        Test Results From Your Hospital Stay        7/15/2018  5:31 PM      Component Results     Component Value Ref Range & Units Status    Color Urine Yellow  Final    Appearance Urine Clear  Final    Glucose Urine Negative NEG^Negative mg/dL Final    Bilirubin Urine Negative NEG^Negative Final    Ketones Urine Negative NEG^Negative mg/dL Final    Specific Gravity Urine <1.005 1.003 - 1.035 Final    Blood Urine Trace (A) NEG^Negative Final    pH Urine 5.5 5.0 - 7.0 pH Final    Protein Albumin Urine Negative NEG^Negative mg/dL Final    Urobilinogen mg/dL 0.2 0.0 - 2.0 mg/dL Final    Nitrite Urine Negative NEG^Negative Final    Leukocyte Esterase Urine Negative NEG^Negative Final    Source Midstream Urine  Final    WBC Urine 0 0 - 5 /HPF Final    RBC Urine 0 0 - 2 /HPF Final    Bacteria Urine Few (A) NEG^Negative /HPF Final                Clinical Quality Measure: Blood Pressure Screening     Your blood pressure was checked while you were in the emergency department today. The last reading we obtained was  BP: 128/80 . Please read the guidelines below about what these numbers mean and what you should do about them.  If your systolic blood pressure (the top number) is less than 120 and your diastolic blood pressure (the bottom number) is less than 80, then your blood pressure is normal. There is nothing more that you need to do about it.  If your systolic blood pressure (the top number) is 120-139 or your  "diastolic blood pressure (the bottom number) is 80-89, your blood pressure may be higher than it should be. You should have your blood pressure rechecked within a year by a primary care provider.  If your systolic blood pressure (the top number) is 140 or greater or your diastolic blood pressure (the bottom number) is 90 or greater, you may have high blood pressure. High blood pressure is treatable, but if left untreated over time it can put you at risk for heart attack, stroke, or kidney failure. You should have your blood pressure rechecked by a primary care provider within the next 4 weeks.  If your provider in the emergency department today gave you specific instructions to follow-up with your doctor or provider even sooner than that, you should follow that instruction and not wait for up to 4 weeks for your follow-up visit.        Thank you for choosing [facility]       Thank you for choosing [facility] for your care. Our goal is always to provide you with excellent care. Hearing back from our patients is one way we can continue to improve our services. Please take a few minutes to complete the written survey that you may receive in the mail after you visit with us. Thank you!        [employer] Information     [employer] lets you send messages to your doctor, view your test results, renew your prescriptions, schedule appointments and more. To sign up, go to [email or web address] . Click on \"Log in\" on the left side of the screen, which will take you to the Welcome page. Then click on \"Sign up Now\" on the right side of the page.     You will be asked to enter the access code listed below, as well as some personal information. Please follow the directions to create your username and password.     Your access code is: [email or web address]  Expires: 10/3/2018  8:41 PM     Your access code will  in 90 days. If you need help or a new code, please call your [facility] clinic or [phone].        Care EveryWhere ID     This is " your Care EveryWhere ID. This could be used by other organizations to access your [facility] medical records  [id number]        Equal Access to Services     RENITA TREVIZO : Hadii sam Hilton, edmund tolliver, marry corley, tomás valente. So [facility] [phone].    ATENCIÓN: Si habla español, tiene a lynn disposición servicios gratuitos de asistencia lingüística. Llame al [phone].    We comply with applicable federal civil rights laws and [place] laws. We do not discriminate on the basis of race, color, national origin, age, disability, sex, sexual orientation, or gender identity.            After Visit Summary       This is your record. Keep this with you and show to your community pharmacist(s) and doctor(s) at your next visit.

## 2018-07-15 NOTE — ED AVS SNAPSHOT
Emergency Department    [address] [place] [address]    Phone:  [phone]    Fax:  [phone]                                       Megan Bettencourt   MRN: [id number]    Department:   Emergency Department   Date of Visit:  7/15/2018           After Visit Summary Signature Page     I have received my discharge instructions, and my questions have been answered. I have discussed any challenges I see with this plan with the nurse or doctor.    ..........................................................................................................................................  Patient/Patient Representative Signature      ..........................................................................................................................................  Patient Representative Print Name and Relationship to Patient    ..................................................               ................................................  Date                                            Time    ..........................................................................................................................................  Reviewed by Signature/Title    ...................................................              ..............................................  Date                                                            Time

## 2018-07-15 NOTE — DISCHARGE INSTRUCTIONS
Lower GI Bleeding (Stable)  You have signs of blood in your stool. This is called rectal bleeding. The bleeding may have begun in another part of your gastrointestinal (GI) tract. If the blood is bright red, it is likely coming from the lower part of the GI tract. If the blood is black or dark, it might be coming from higher up in the GI tract. Very small amounts of GI bleeding may not be visible and can only be discovered during a test on your stool. Possible causes of lower GI bleeding include:    Hemorrhoids    Anal fissures    Diverticulitis    Inflammatory bowel disease (Crohn's disease or ulcerative colitis)    Polyps (growths) in the intestine  Note: Iron supplements and medicines for diarrhea or upset stomach can cause black stools. Foods such as licorice and red beets can also discolor the stool and be mistaken for bleeding. These are not bleeding and are not a cause for alarm.  Home care  You have not lost a large amount of blood and your condition appears stable at this time. You may resume normal activity as long as you feel well.  Don't take NSAIDs, such as aspirin, ibuprofen, or naproxen. They can irritate the stomach and cause further bleeding. If you are taking these medicines for other medical reasons, talk to your healthcare provider before you stop them.   Follow-up care  Follow up with your healthcare provider as advised. Further tests may be needed to find the cause of your bleeding.  When to seek medical advice  Call your healthcare provider right away for any of the following:    Large amount of rectal bleeding     Increasing abdominal pain    Weakness, dizziness  Call 911  Call 911 if any of the following occur:    Loss of consciousness    Vomiting blood  Date Last Reviewed: 6/24/2015 2000-2017 [employer]. [address] [place] [address], [place] [address]. All rights reserved. This information is not intended as a substitute for professional medical care. Always follow your  healthcare professional's instructions.
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
## 2018-07-19 ENCOUNTER — HOSPITAL ENCOUNTER (OUTPATIENT)
Facility: CLINIC | Age: 63
End: 2018-07-19
Attending: COLON & RECTAL SURGERY | Admitting: COLON & RECTAL SURGERY
Payer: COMMERCIAL

## 2018-07-25 RX ORDER — ONDANSETRON 2 MG/ML
4 INJECTION INTRAMUSCULAR; INTRAVENOUS
Status: CANCELLED | OUTPATIENT
Start: 2018-07-25

## 2018-07-25 RX ORDER — LIDOCAINE 40 MG/G
CREAM TOPICAL
Status: CANCELLED | OUTPATIENT
Start: 2018-07-25

## 2018-07-30 RX ORDER — ONDANSETRON 2 MG/ML
4 INJECTION INTRAMUSCULAR; INTRAVENOUS
Status: CANCELLED | OUTPATIENT
Start: 2018-07-30

## 2018-07-30 RX ORDER — LIDOCAINE 40 MG/G
CREAM TOPICAL
Status: CANCELLED | OUTPATIENT
Start: 2018-07-30

## 2018-08-01 ENCOUNTER — HOSPITAL ENCOUNTER (OUTPATIENT)
Facility: CLINIC | Age: 63
End: 2018-08-01
Attending: COLON & RECTAL SURGERY | Admitting: COLON & RECTAL SURGERY
Payer: COMMERCIAL

## 2018-08-02 RX ORDER — LIDOCAINE 40 MG/G
CREAM TOPICAL
Status: CANCELLED | OUTPATIENT
Start: 2018-08-02

## 2018-08-02 RX ORDER — ONDANSETRON 2 MG/ML
4 INJECTION INTRAMUSCULAR; INTRAVENOUS
Status: CANCELLED | OUTPATIENT
Start: 2018-08-02

## 2019-02-07 ENCOUNTER — HOSPITAL ENCOUNTER (OUTPATIENT)
Facility: CLINIC | Age: 64
Discharge: HOME | End: 2019-02-07
Attending: COLON & RECTAL SURGERY | Admitting: COLON & RECTAL SURGERY
Payer: COMMERCIAL

## 2019-02-07 VITALS
HEART RATE: 92 BPM | HEIGHT: 68 IN | BODY MASS INDEX: 21.22 KG/M2 | RESPIRATION RATE: 5 BRPM | WEIGHT: 140 LBS | SYSTOLIC BLOOD PRESSURE: 161 MMHG | DIASTOLIC BLOOD PRESSURE: 121 MMHG | OXYGEN SATURATION: 94 %

## 2019-02-07 LAB — COLONOSCOPY: NORMAL

## 2019-02-07 PROCEDURE — 45385 COLONOSCOPY W/LESION REMOVAL: CPT | Mod: PT | Performed by: COLON & RECTAL SURGERY

## 2019-02-07 PROCEDURE — G0500 MOD SEDAT ENDO SERVICE >5YRS: HCPCS | Performed by: COLON & RECTAL SURGERY

## 2019-02-07 PROCEDURE — 25000128 H RX IP 250 OP 636: Performed by: COLON & RECTAL SURGERY

## 2019-02-07 PROCEDURE — 27210582 ZZH DEVICE CLIP RESOLUTION, EACH: Performed by: COLON & RECTAL SURGERY

## 2019-02-07 PROCEDURE — 99153 MOD SED SAME PHYS/QHP EA: CPT | Performed by: COLON & RECTAL SURGERY

## 2019-02-07 PROCEDURE — 88305 TISSUE EXAM BY PATHOLOGIST: CPT | Mod: 26 | Performed by: COLON & RECTAL SURGERY

## 2019-02-07 PROCEDURE — 88305 TISSUE EXAM BY PATHOLOGIST: CPT | Performed by: COLON & RECTAL SURGERY

## 2019-02-07 RX ORDER — LIDOCAINE 40 MG/G
CREAM TOPICAL
Status: DISCONTINUED | OUTPATIENT
Start: 2019-02-07 | End: 2019-02-07 | Stop reason: HOSPADM

## 2019-02-07 RX ORDER — ONDANSETRON 2 MG/ML
4 INJECTION INTRAMUSCULAR; INTRAVENOUS
Status: DISCONTINUED | OUTPATIENT
Start: 2019-02-07 | End: 2019-02-07 | Stop reason: HOSPADM

## 2019-02-07 RX ORDER — FENTANYL CITRATE 50 UG/ML
INJECTION, SOLUTION INTRAMUSCULAR; INTRAVENOUS PRN
Status: DISCONTINUED | OUTPATIENT
Start: 2019-02-07 | End: 2019-02-07 | Stop reason: HOSPADM

## 2019-02-07 RX ORDER — METOPROLOL SUCCINATE 50 MG/1
50 TABLET, EXTENDED RELEASE ORAL DAILY
COMMUNITY
End: 2021-05-13

## 2019-02-07 RX ORDER — FLUMAZENIL 0.1 MG/ML
0.2 INJECTION, SOLUTION INTRAVENOUS
Status: DISCONTINUED | OUTPATIENT
Start: 2019-02-07 | End: 2019-02-07 | Stop reason: HOSPADM

## 2019-02-07 RX ORDER — ONDANSETRON 4 MG/1
4 TABLET, ORALLY DISINTEGRATING ORAL EVERY 6 HOURS PRN
Status: DISCONTINUED | OUTPATIENT
Start: 2019-02-07 | End: 2019-02-07 | Stop reason: HOSPADM

## 2019-02-07 RX ORDER — NALOXONE HYDROCHLORIDE 0.4 MG/ML
.1-.4 INJECTION, SOLUTION INTRAMUSCULAR; INTRAVENOUS; SUBCUTANEOUS
Status: DISCONTINUED | OUTPATIENT
Start: 2019-02-07 | End: 2019-02-07 | Stop reason: HOSPADM

## 2019-02-07 RX ORDER — ONDANSETRON 2 MG/ML
4 INJECTION INTRAMUSCULAR; INTRAVENOUS EVERY 6 HOURS PRN
Status: DISCONTINUED | OUTPATIENT
Start: 2019-02-07 | End: 2019-02-07 | Stop reason: HOSPADM

## 2019-02-07 ASSESSMENT — MIFFLIN-ST. JEOR: SCORE: 1233.54

## 2019-02-07 NOTE — H&P
Pre-Endoscopy History and Physical     Megan Bettencourt MRN# [id number]   YOB: 1955 Age: 63 year old     Date of Procedure: (Not on file)  Primary care provider: Gunnar Sarkar  Type of Endoscopy: Colonoscopy  Reason for Procedure: Screening  Type of Anesthesia Anticipated: Moderate Sedation    HPI:    Megan is a 63 year old female who will be undergoing the above procedure.      A history and physical has been performed. The patient's medications and allergies have been reviewed. The risks and benefits of the procedure and the sedation options and risks were discussed with the patient.  All questions were answered and informed consent was obtained.      She denies a personal or family history of anesthesia complications or bleeding disorders.     Allergies   Allergen Reactions     Morphine Hcl Itching     Vicodin [Hydrocodone-Acetaminophen] Itching     In higher doses            No current facility-administered medications on file prior to encounter.   Current Outpatient Medications on File Prior to Encounter:  Amphetamine-Dextroamphetamine (ADDERALL PO) Take 10 mg by mouth daily    Levothyroxine Sodium (SYNTHROID PO) Take 50 mcg by mouth daily    METOPROLOL SUCCINATE ER PO    venlafaxine (EFFEXOR-XR) 150 MG 24 hr capsule Take 150 mg by mouth daily       Patient Active Problem List   Diagnosis     Blunt trauma of multiple sites     Pneumothorax, closed, traumatic, initial encounter        Past Medical History:   Diagnosis Date     Anxiety      Cervical stenosis of spine      Thyroid disease      Vision disturbance         Past Surgical History:   Procedure Laterality Date     BREAST SURGERY      augmentation      SECTION       COSMETIC BLEPHAROPLASTY LOWER LIDS BILATERAL  2014    Procedure: COSMETIC BLEPHAROPLASTY LOWER LIDS BILATERAL;  Surgeon: Loi Mcpherson MD;  Location: [facility]     COSMETIC SURGERY      neck lift     ORTHOPEDIC SURGERY  2017    bilateral wrists and  "right heel fracture     REPAIR PTOSIS BILATERAL  6/30/2014    Procedure: REPAIR PTOSIS BILATERAL;  Surgeon: Loi Mcpherson MD;  Location: [facility]       Social History     Tobacco Use     Smoking status: Never Smoker     Smokeless tobacco: Never Used   Substance Use Topics     Alcohol use: Yes     Alcohol/week: 0.0 oz     Comment: occasionally       History reviewed. No pertinent family history.    REVIEW OF SYSTEMS:     5 point ROS negative except as noted above in HPI, including Gen., Resp., CV, GI &  system review.      PHYSICAL EXAM:   BP (!) 139/106   Pulse 76   Resp 10   Ht 1.727 m (5' 8\")   Wt 63.5 kg (140 lb)   SpO2 99%   BMI 21.29 kg/m   Estimated body mass index is 21.29 kg/m  as calculated from the following:    Height as of this encounter: 1.727 m (5' 8\").    Weight as of this encounter: 63.5 kg (140 lb).   GENERAL APPEARANCE: healthy and alert  MENTAL STATUS: alert  RESP: lungs clear to auscultation - no rales, rhonchi or wheezes  CV: regular rates and rhythm and normal S1 S2, no S3 or S4      IMPRESSION   ASA Class 2 - Mild systemic disease        PLAN:     Plan for colonoscopy. We discussed the risks, benefits and alternatives and the patient wished to proceed.    The above has been forwarded to the consulting provider.      Liam Arora MD  [facility]  Phone: [phone]  February 7, 2019    "

## 2019-02-08 LAB — COPATH REPORT: NORMAL

## 2019-03-20 ENCOUNTER — HOSPITAL ENCOUNTER (EMERGENCY)
Dept: CARDIOLOGY | Facility: CLINIC | Age: 64
End: 2019-03-20
Attending: EMERGENCY MEDICINE
Payer: COMMERCIAL

## 2019-03-20 ENCOUNTER — HOSPITAL ENCOUNTER (EMERGENCY)
Facility: CLINIC | Age: 64
Discharge: HOME | End: 2019-03-20
Attending: EMERGENCY MEDICINE | Admitting: EMERGENCY MEDICINE
Payer: COMMERCIAL

## 2019-03-20 ENCOUNTER — APPOINTMENT (OUTPATIENT)
Dept: CT IMAGING | Facility: CLINIC | Age: 64
End: 2019-03-20
Attending: EMERGENCY MEDICINE
Payer: COMMERCIAL

## 2019-03-20 VITALS
SYSTOLIC BLOOD PRESSURE: 155 MMHG | BODY MASS INDEX: 21.22 KG/M2 | TEMPERATURE: 98.1 F | WEIGHT: 140 LBS | DIASTOLIC BLOOD PRESSURE: 90 MMHG | RESPIRATION RATE: 16 BRPM | HEART RATE: 91 BPM | HEIGHT: 68 IN | OXYGEN SATURATION: 96 %

## 2019-03-20 DIAGNOSIS — F41.9 ANXIETY: ICD-10-CM

## 2019-03-20 DIAGNOSIS — R55 SYNCOPE, UNSPECIFIED SYNCOPE TYPE: ICD-10-CM

## 2019-03-20 DIAGNOSIS — M62.81 GENERALIZED MUSCLE WEAKNESS: ICD-10-CM

## 2019-03-20 LAB
ALBUMIN SERPL-MCNC: 4 G/DL (ref 3.4–5)
ALBUMIN UR-MCNC: 30 MG/DL
ALP SERPL-CCNC: 109 U/L (ref 40–150)
ALT SERPL W P-5'-P-CCNC: 45 U/L (ref 0–50)
AMORPH CRY #/AREA URNS HPF: ABNORMAL /HPF
ANION GAP SERPL CALCULATED.3IONS-SCNC: 10 MMOL/L (ref 3–14)
APPEARANCE UR: CLEAR
AST SERPL W P-5'-P-CCNC: 69 U/L (ref 0–45)
BASOPHILS # BLD AUTO: 0 10E9/L (ref 0–0.2)
BASOPHILS NFR BLD AUTO: 0.4 %
BILIRUB SERPL-MCNC: 1.5 MG/DL (ref 0.2–1.3)
BILIRUB UR QL STRIP: NEGATIVE
BUN SERPL-MCNC: 19 MG/DL (ref 7–30)
CALCIUM SERPL-MCNC: 9.1 MG/DL (ref 8.5–10.1)
CHLORIDE SERPL-SCNC: 98 MMOL/L (ref 94–109)
CO2 SERPL-SCNC: 29 MMOL/L (ref 20–32)
COLOR UR AUTO: YELLOW
CREAT SERPL-MCNC: 0.73 MG/DL (ref 0.52–1.04)
DIFFERENTIAL METHOD BLD: NORMAL
EOSINOPHIL # BLD AUTO: 0.1 10E9/L (ref 0–0.7)
EOSINOPHIL NFR BLD AUTO: 1.9 %
ERYTHROCYTE [DISTWIDTH] IN BLOOD BY AUTOMATED COUNT: 13.8 % (ref 10–15)
ETHANOL SERPL-MCNC: <0.01 G/DL
GFR SERPL CREATININE-BSD FRML MDRD: 87 ML/MIN/{1.73_M2}
GLUCOSE SERPL-MCNC: 96 MG/DL (ref 70–99)
GLUCOSE UR STRIP-MCNC: NEGATIVE MG/DL
HCT VFR BLD AUTO: 43.9 % (ref 35–47)
HGB BLD-MCNC: 14.9 G/DL (ref 11.7–15.7)
HGB UR QL STRIP: NEGATIVE
IMM GRANULOCYTES # BLD: 0 10E9/L (ref 0–0.4)
IMM GRANULOCYTES NFR BLD: 0.2 %
INTERPRETATION ECG - MUSE: NORMAL
KETONES UR STRIP-MCNC: 10 MG/DL
LEUKOCYTE ESTERASE UR QL STRIP: NEGATIVE
LYMPHOCYTES # BLD AUTO: 1.3 10E9/L (ref 0.8–5.3)
LYMPHOCYTES NFR BLD AUTO: 24.4 %
MAGNESIUM SERPL-MCNC: 2.4 MG/DL (ref 1.6–2.3)
MCH RBC QN AUTO: 32 PG (ref 26.5–33)
MCHC RBC AUTO-ENTMCNC: 33.9 G/DL (ref 31.5–36.5)
MCV RBC AUTO: 94 FL (ref 78–100)
MONOCYTES # BLD AUTO: 0.6 10E9/L (ref 0–1.3)
MONOCYTES NFR BLD AUTO: 10.4 %
MUCOUS THREADS #/AREA URNS LPF: PRESENT /LPF
NEUTROPHILS # BLD AUTO: 3.4 10E9/L (ref 1.6–8.3)
NEUTROPHILS NFR BLD AUTO: 62.7 %
NITRATE UR QL: NEGATIVE
NRBC # BLD AUTO: 0 10*3/UL
NRBC BLD AUTO-RTO: 0 /100
PH UR STRIP: 7.5 PH (ref 5–7)
PLATELET # BLD AUTO: 224 10E9/L (ref 150–450)
POTASSIUM SERPL-SCNC: 3.9 MMOL/L (ref 3.4–5.3)
PROT SERPL-MCNC: 8.3 G/DL (ref 6.8–8.8)
RBC # BLD AUTO: 4.66 10E12/L (ref 3.8–5.2)
RBC #/AREA URNS AUTO: 0 /HPF (ref 0–2)
SODIUM SERPL-SCNC: 137 MMOL/L (ref 133–144)
SOURCE: ABNORMAL
SP GR UR STRIP: 1.02 (ref 1–1.03)
SQUAMOUS #/AREA URNS AUTO: 2 /HPF (ref 0–1)
TSH SERPL DL<=0.005 MIU/L-ACNC: 3.67 MU/L (ref 0.4–4)
UROBILINOGEN UR STRIP-MCNC: 2 MG/DL (ref 0–2)
WBC # BLD AUTO: 5.4 10E9/L (ref 4–11)
WBC #/AREA URNS AUTO: <1 /HPF (ref 0–5)

## 2019-03-20 PROCEDURE — 85025 COMPLETE CBC W/AUTO DIFF WBC: CPT | Performed by: EMERGENCY MEDICINE

## 2019-03-20 PROCEDURE — 83735 ASSAY OF MAGNESIUM: CPT | Performed by: EMERGENCY MEDICINE

## 2019-03-20 PROCEDURE — 80053 COMPREHEN METABOLIC PANEL: CPT | Performed by: EMERGENCY MEDICINE

## 2019-03-20 PROCEDURE — 84443 ASSAY THYROID STIM HORMONE: CPT | Performed by: EMERGENCY MEDICINE

## 2019-03-20 PROCEDURE — 93005 ELECTROCARDIOGRAM TRACING: CPT

## 2019-03-20 PROCEDURE — 81001 URINALYSIS AUTO W/SCOPE: CPT | Mod: XU | Performed by: EMERGENCY MEDICINE

## 2019-03-20 PROCEDURE — 93272 ECG/REVIEW INTERPRET ONLY: CPT | Performed by: INTERNAL MEDICINE

## 2019-03-20 PROCEDURE — 80320 DRUG SCREEN QUANTALCOHOLS: CPT | Performed by: EMERGENCY MEDICINE

## 2019-03-20 PROCEDURE — 70450 CT HEAD/BRAIN W/O DYE: CPT

## 2019-03-20 PROCEDURE — 93270 REMOTE 30 DAY ECG REV/REPORT: CPT

## 2019-03-20 PROCEDURE — 99285 EMERGENCY DEPT VISIT HI MDM: CPT | Mod: 25

## 2019-03-20 ASSESSMENT — ENCOUNTER SYMPTOMS
LIGHT-HEADEDNESS: 1
DIARRHEA: 0
WEAKNESS: 1
DYSURIA: 0
ABDOMINAL PAIN: 0
HEADACHES: 1
DIFFICULTY URINATING: 0
UNEXPECTED WEIGHT CHANGE: 0
DIZZINESS: 1
APPETITE CHANGE: 1

## 2019-03-20 ASSESSMENT — MIFFLIN-ST. JEOR: SCORE: 1233.54

## 2019-03-20 NOTE — ED AVS SNAPSHOT
Emergency Department  [address] [place] [address]  Phone:  [phone]  Fax:  [phone]                                    Megan Bettencourt   MRN: [id number]    Department:   Emergency Department   Date of Visit:  3/20/2019           After Visit Summary Signature Page    I have received my discharge instructions, and my questions have been answered. I have discussed any challenges I see with this plan with the nurse or doctor.    ..........................................................................................................................................  Patient/Patient Representative Signature      ..........................................................................................................................................  Patient Representative Print Name and Relationship to Patient    ..................................................               ................................................  Date                                   Time    ..........................................................................................................................................  Reviewed by Signature/Title    ...................................................              ..............................................  Date                                               Time          22EPIC Rev 08/18

## 2019-03-20 NOTE — ED PROVIDER NOTES
History     Chief Complaint:  Anxiety and Weakness     The history is provided by the patient.      Megan Bettencourt is a 63 year old female who presents with increased anxiety and depression, weakness, and frequent lightheadedness as well as severe hot flashes for the past three days. In addition, she reports a syncopal episode two days ago in the kitchen. She reports amnesia of this event and states she only remembers being in the kitchen and her friend waking her up. She reports similar episode of symptoms a month ago. Today, patient reports severe anxiety that prompted her to administer two doses of Klonopin of which she endorses relief from. She also reports administering a second dose of her blood pressure medicines after seeing her pressures at 175/80 at [facility]. She states she is never hungry and reports a minor left sided headache that has been on and off today. Furthermore, patient asserts she is often shaky and states she feels unstable when she ambulates. Patient denies any abdominal pain, diarrhea, weight loss, difficulty urinating, dysuria, or personal history of irregular heart beats/rhythm as well as history of diabetes, hyperlipidemia, and tobacco use. She states her last drink was a couple weeks ago. She denies any current psychiatrist.     Allergies:  Morphine  Vicodin     Medications:    Adderall  Synthroid  Percocet  Effexor  Klonopin     Past Medical History:    Anxiety  Cervical stenosis of spine  Thyroid disease  Hypertension  Pneumothorax     Past Surgical History:    Breast surgery  C section  Neck lift  Orthopedic surgery  Bilateral Ptosis Repair      Family History:    The patient denies any relevant family medical history.     Social History:  The patient was accompanied to the ED by sister.  Smoking Status: Never Smoker  Alcohol Use: Last drink was a couple weeks ago.     Review of Systems   Constitutional: Positive for appetite change (decreased). Negative for unexpected weight change.  "       (+) Hot flashes   Gastrointestinal: Negative for abdominal pain and diarrhea.   Genitourinary: Negative for difficulty urinating and dysuria.   Musculoskeletal: Positive for gait problem.   Neurological: Positive for dizziness, syncope, weakness, light-headedness and headaches.   All other systems reviewed and are negative.    Physical Exam     Patient Vitals for the past 24 hrs:   BP Temp Temp src Pulse Resp SpO2 Height Weight   03/20/19 1232 153/83 98.1  F (36.7  C) Oral 92 16 96 % 1.727 m (5' 8\") 63.5 kg (140 lb)     Physical Exam  Constitutional: White female supine, tremulous  HENT: No signs of trauma.   Eyes: EOM are normal. Pupils are equal, round, and reactive to light.   Neck: Normal range of motion. No JVD present. No cervical adenopathy.  Cardiovascular: Regular rhythm.  Exam reveals no gallop and no friction rub.    No murmur heard.  Pulmonary/Chest: Bilateral breath sounds normal. No wheezes, rhonchi or rales.  Abdominal: Soft. No tenderness. No rebound or guarding.   Musculoskeletal: No edema. No tenderness. Abrasion right knee.   Lymphadenopathy: No lymphadenopathy.   Neurological: Alert and oriented to person, place, and time. Normal strength. Coordination normal. No facial asymmetry. Fluent speech. Gait stable.   Skin: Skin is warm and dry. No rash noted. No erythema.     Emergency Department Course     ECG:  ECG taken at 1236, ECG read at 1242  Normal sinus rhythm  Rate 80 bpm. CT interval 158 ms. QRS duration 86 ms. QT/QTc 388/447 ms. P-R-T axes 80 265 74.    Imaging:  Radiology findings were communicated with the patient who voiced understanding of the findings.    CT Head w/o Contrast  Normal head CT.  Reading per radiology    Laboratory:  Laboratory findings were communicated with the patient who voiced understanding of the findings.    CBC: WBC 5.4, HGB 14.9,   CMP: bilirubin 1.5, AST 69 o/w WNL (Creatinine 0.73)  Magnesium: 2.4 (H)  Alcohol ethyl: <0.01  TSH with free T4 reflex: " 3.67  UA: ketones 10, pH 7.5, protein 30, HPF 2, mucous present, amorphous crystals few, WBC <1, RBC 0 o/w WNL    Emergency Department Course:  1236 EKG obtained in the ED, see results above.     1322 Nursing notes and vitals reviewed.    1330 I performed an exam of the patient as documented above.     1352 IV was inserted and blood was drawn for laboratory testing, results above.    1408 The patient was sent for a CT while in the emergency department, results above.     1416 The patient provided a urine sample here in the emergency department. This was sent for laboratory testing, findings above.    1500 Patient would like to go home prior to Tele-DEC evaluation. Resources on how to reach out to Central Intake was given to the patient.     Impression & Plan      Medical Decision Making:  This is a 63 year old woman with her friend stating she has been feeling very lightheaded and weak for several days. She notes that a couple days ago, she passed out, she was in the kitchen and next thing she remembers was her friend there waking her up. She states another similar event happened about a month ago. She denies any significant headache, chest pain, shortness of breath, or focal weakness. She's not been febrile and she is eating and drinking and urinating. She denies any significant weight loss. Her exam here is non-focal other than she is a bit anxious and has some generalized shakiness. I asked about drinking and she says she only rarely has a drink. Her workup here, labs are unremarkable, CT head is negative, EKG shows no acute changes. We've set the patient up for an outpatient monitor as well as echocardiogram. The patient initially was waiting for Tele-DEC, but no longer wants to wait.  Will give her central intake # to set up evaluation so resources can be provided, both therapy and psychiatry. Follow up with primary MD and return if symptoms worsens.     Diagnosis:    ICD-10-CM   1. Generalized muscle weakness  M62.81   2. Syncope, unspecified syncope type R55   3. Anxiety F41.9     Disposition:   discharge    Scribe Disclosure:  I, Tyrone Hopper, am serving as a scribe at 1:37 PM on 3/20/2019 to document services personally performed by Maksim Levin MD based on my observations and the provider's statements to me.     EMERGENCY DEPARTMENT       Maksim Levin MD  03/20/19 1558

## 2019-08-20 ENCOUNTER — HOSPITAL PATHOLOGY (OUTPATIENT)
Dept: OTHER | Facility: CLINIC | Age: 64
End: 2019-08-20

## 2019-08-21 LAB — COPATH REPORT: NORMAL

## 2019-09-24 ENCOUNTER — HOSPITAL ENCOUNTER (EMERGENCY)
Facility: CLINIC | Age: 64
Discharge: HOME | End: 2019-09-24
Attending: EMERGENCY MEDICINE | Admitting: EMERGENCY MEDICINE
Payer: COMMERCIAL

## 2019-09-24 ENCOUNTER — APPOINTMENT (OUTPATIENT)
Dept: GENERAL RADIOLOGY | Facility: CLINIC | Age: 64
End: 2019-09-24
Attending: EMERGENCY MEDICINE
Payer: COMMERCIAL

## 2019-09-24 VITALS
OXYGEN SATURATION: 98 % | BODY MASS INDEX: 22.76 KG/M2 | HEIGHT: 67 IN | TEMPERATURE: 98 F | RESPIRATION RATE: 18 BRPM | SYSTOLIC BLOOD PRESSURE: 122 MMHG | DIASTOLIC BLOOD PRESSURE: 98 MMHG | WEIGHT: 145 LBS

## 2019-09-24 DIAGNOSIS — S93.602A SPRAIN OF LEFT FOOT, INITIAL ENCOUNTER: ICD-10-CM

## 2019-09-24 PROCEDURE — 73630 X-RAY EXAM OF FOOT: CPT | Mod: LT

## 2019-09-24 PROCEDURE — 99283 EMERGENCY DEPT VISIT LOW MDM: CPT

## 2019-09-24 ASSESSMENT — MIFFLIN-ST. JEOR: SCORE: 1235.35
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
## 2019-09-24 NOTE — ED AVS SNAPSHOT
Emergency Department  [address] [place] [address]  Phone:  [phone]  Fax:  [phone]                                    Megan Bettencourt   MRN: [id number]    Department:   Emergency Department   Date of Visit:  9/24/2019           After Visit Summary Signature Page    I have received my discharge instructions, and my questions have been answered. I have discussed any challenges I see with this plan with the nurse or doctor.    ..........................................................................................................................................  Patient/Patient Representative Signature      ..........................................................................................................................................  Patient Representative Print Name and Relationship to Patient    ..................................................               ................................................  Date                                   Time    ..........................................................................................................................................  Reviewed by Signature/Title    ...................................................              ..............................................  Date                                               Time          22EPIC Rev 08/18

## 2019-12-21 NOTE — PLAN OF CARE
Problem: Patient Care Overview  Goal: Plan of Care/Patient Progress Review  AVSS. Pain controlled with PRN. CT site CDI; CT to water seal. LS clear and equal. Tolerating regular diet. Up Ind room. BS active and audible. Possible CT removal tomorrow.      
Problem: Patient Care Overview  Goal: Plan of Care/Patient Progress Review  Discharge instructions given, reviewed, questions answered. Percocet hard script sent with pt. Sent home with family via personal transport/wheelchair. Pt stable, denies pain/nausea.      
Problem: Patient Care Overview  Goal: Plan of Care/Patient Progress Review  OBSERVATION GOALS    -diagnostic tests and consults completed and resulted: not met  -vital signs normal or at patient baseline: met      
Problem: Patient Care Overview  Goal: Plan of Care/Patient Progress Review  OBSERVATION GOALS    -diagnostic tests and consults completed and resulted: not met  -vital signs normal or at patient baseline: met    A&Ox4. VSS on 2L O2 NC. Pain managed w/ ibuprofen. PRN percocet available. R ankle/foot numb baseline. Up SBA. IV infusing. LS clear. CXR in AM. Continue to monitor.  
Problem: Patient Care Overview  Goal: Plan of Care/Patient Progress Review  Outcome: Improving  A+O, AVSS on room air. LS clear, BS+, flatus+, no BM this shift. CT removed around 1300. Plans to d/c after CXR this evening.      
Problem: Patient Care Overview  Goal: Plan of Care/Patient Progress Review  Outcome: Improving  Pt. A & O x 4.  Pain managed with alternating ibuprofen and percocet.  Pt. Voiding adequately.  Up ad omid in room.  Chest tube to -20 suction, no leak.  Tolerating regular diet.  VSS.  X-ray scheduled for a.m.        
Problem: Patient Care Overview  Goal: Plan of Care/Patient Progress Review  Outcome: Improving  VSS, LS clear, CT intact w/ no AL or crepitus, pain well managed w/ percocet and ibuprofen, up Ind        
Problem: Patient Care Overview  Goal: Plan of Care/Patient Progress Review  Outcome: No Change  Observation goals PRIOR TO DISCHARGE     Comments:   -diagnostic tests and consults completed and resulted: Not met   -vital signs normal or at patient baseline: Met  Nurse to notify provider when observation goals have been met and patient is ready for discharge.           
Problem: Patient Care Overview  Goal: Plan of Care/Patient Progress Review  Outcome: No Change  Observation goals PRIOR TO DISCHARGE     Comments:   -diagnostic tests and consults completed and resulted: Not met   -vital signs normal or at patient baseline: Met  Nurse to notify provider when observation goals have been met and patient is ready for discharge.     Pt A&O, VSS. On 2L O2 for comfort. Ongoing pain in chest, improved with prn ibuprofen and percocet. Denies breathing difficulty or SOB. LS diminished in RUL and RLL. Using IS. NPO. Ind in room. Repeat cxray completed. Plans for chest tube placement and to transfer inpatient after procedure. Report called to oncoming nurse.       
Problem: Patient Care Overview  Goal: Plan of Care/Patient Progress Review  Outcome: No Change  Observation goals PRIOR TO DISCHARGE     Comments: -diagnostic tests and consults completed and resulted - not met  -vital signs normal or at patient baseline - met    Nurse to notify provider when observation goals have been met and patient is ready for discharge.     Pt arrived on unit at 1920.  Pt A&Ox4.  VSS on 2L oxygen via NC.  Pain managed with IV Dilaudid.  Percocet and Ibuprofen also available.  Right foot numb at baseline.  Up SBA.  D5 0.45 NS w/20mEq K+ infusing.  Lung sounds clear.  Plan for CXR in AM.  Nursing to continue to monitor.      
Problem: Patient Care Overview  Goal: Plan of Care/Patient Progress Review  Outcome: No Change  VSS, bowels active, tolerating regular diet, chest tube no crepitus, no airleak, dressing CDI, DTV, percocet for pain      
Problem: Patient Care Overview  Goal: Plan of Care/Patient Progress Review  Outcome: No Change  Vital Signs stable. Alert and Oriented. Lung sounds clear and IS to 1250. Chest tube incision dressing is clean, dry, and intact. No crepitus or air leak noted. Pain controlled with percocet and ibuprofen . Bowel sounds active and audible and passing gas. Adequate urinary output. Pt ambulating independently in room. On regular diet and tolerating it well. No complaints of nausea.        
Problem: Surgery Nonspecified (Adult)  Goal: Signs and Symptoms of Listed Potential Problems Will be Absent, Minimized or Managed (Surgery Nonspecified)  Signs and symptoms of listed potential problems will be absent, minimized or managed by discharge/transition of care (reference Surgery Nonspecified (Adult) CPG).   Outcome: Improving  VSS on room air. A/Ox4. Incisions at chest tube site with dsg intact. Chest tube to water seal with minimal SS o/p, No AL. Pain controlled with PRN ibuprofen and percocet. Benadryl given for itching. Up independently, ambulated in halls on shift.  Regular diet, silvio well. BS active, Flatus +. Voiding adequately to bathroom. LS clear. IS to 1250.         
Leo Arcos(Attending)

## 2019-12-30 ENCOUNTER — HOSPITAL ENCOUNTER (INPATIENT)
Facility: CLINIC | Age: 64
LOS: 2 days | Discharge: HOME | DRG: 101 | End: 2020-01-01
Attending: EMERGENCY MEDICINE | Admitting: HOSPITALIST
Payer: COMMERCIAL

## 2019-12-30 ENCOUNTER — APPOINTMENT (OUTPATIENT)
Dept: CT IMAGING | Facility: CLINIC | Age: 64
DRG: 101 | End: 2019-12-30
Attending: EMERGENCY MEDICINE
Payer: COMMERCIAL

## 2019-12-30 DIAGNOSIS — R40.4 TRANSIENT ALTERATION OF AWARENESS: ICD-10-CM

## 2019-12-30 DIAGNOSIS — R25.1 EPISODE OF SHAKING: ICD-10-CM

## 2019-12-30 PROBLEM — R56.9 SEIZURE (H): Status: ACTIVE | Noted: 2019-12-30

## 2019-12-30 LAB
ALBUMIN SERPL-MCNC: 3.4 G/DL (ref 3.4–5)
ALBUMIN UR-MCNC: NEGATIVE MG/DL
ALP SERPL-CCNC: 62 U/L (ref 40–150)
ALT SERPL W P-5'-P-CCNC: 19 U/L (ref 0–50)
AMPHETAMINES UR QL SCN: NEGATIVE
ANION GAP SERPL CALCULATED.3IONS-SCNC: 4 MMOL/L (ref 3–14)
APPEARANCE UR: CLEAR
AST SERPL W P-5'-P-CCNC: 28 U/L (ref 0–45)
BARBITURATES UR QL: NEGATIVE
BASOPHILS # BLD AUTO: 0 10E9/L (ref 0–0.2)
BASOPHILS NFR BLD AUTO: 0.3 %
BENZODIAZ UR QL: NEGATIVE
BILIRUB SERPL-MCNC: 0.6 MG/DL (ref 0.2–1.3)
BILIRUB UR QL STRIP: NEGATIVE
BUN SERPL-MCNC: 5 MG/DL (ref 7–30)
CALCIUM SERPL-MCNC: 8.8 MG/DL (ref 8.5–10.1)
CANNABINOIDS UR QL SCN: NEGATIVE
CHLORIDE SERPL-SCNC: 105 MMOL/L (ref 94–109)
CO2 SERPL-SCNC: 30 MMOL/L (ref 20–32)
COCAINE UR QL: NEGATIVE
COLOR UR AUTO: YELLOW
CREAT SERPL-MCNC: 0.66 MG/DL (ref 0.52–1.04)
DIFFERENTIAL METHOD BLD: NORMAL
EOSINOPHIL # BLD AUTO: 0.1 10E9/L (ref 0–0.7)
EOSINOPHIL NFR BLD AUTO: 1.4 %
ERYTHROCYTE [DISTWIDTH] IN BLOOD BY AUTOMATED COUNT: 14.1 % (ref 10–15)
ETHANOL SERPL-MCNC: <0.01 G/DL
GFR SERPL CREATININE-BSD FRML MDRD: >90 ML/MIN/{1.73_M2}
GLUCOSE SERPL-MCNC: 128 MG/DL (ref 70–99)
GLUCOSE UR STRIP-MCNC: NEGATIVE MG/DL
HCT VFR BLD AUTO: 41.9 % (ref 35–47)
HGB BLD-MCNC: 13.5 G/DL (ref 11.7–15.7)
HGB UR QL STRIP: NEGATIVE
IMM GRANULOCYTES # BLD: 0 10E9/L (ref 0–0.4)
IMM GRANULOCYTES NFR BLD: 0.2 %
INTERPRETATION ECG - MUSE: NORMAL
KETONES UR STRIP-MCNC: NEGATIVE MG/DL
LEUKOCYTE ESTERASE UR QL STRIP: NEGATIVE
LYMPHOCYTES # BLD AUTO: 1.2 10E9/L (ref 0.8–5.3)
LYMPHOCYTES NFR BLD AUTO: 21 %
MAGNESIUM SERPL-MCNC: 2.5 MG/DL (ref 1.6–2.3)
MCH RBC QN AUTO: 32.1 PG (ref 26.5–33)
MCHC RBC AUTO-ENTMCNC: 32.2 G/DL (ref 31.5–36.5)
MCV RBC AUTO: 100 FL (ref 78–100)
MONOCYTES # BLD AUTO: 0.7 10E9/L (ref 0–1.3)
MONOCYTES NFR BLD AUTO: 12.7 %
MUCOUS THREADS #/AREA URNS LPF: PRESENT /LPF
NEUTROPHILS # BLD AUTO: 3.7 10E9/L (ref 1.6–8.3)
NEUTROPHILS NFR BLD AUTO: 64.4 %
NITRATE UR QL: NEGATIVE
NRBC # BLD AUTO: 0 10*3/UL
NRBC BLD AUTO-RTO: 0 /100
OPIATES UR QL SCN: NEGATIVE
PCP UR QL SCN: NEGATIVE
PH UR STRIP: 7 PH (ref 5–7)
PLATELET # BLD AUTO: 337 10E9/L (ref 150–450)
POTASSIUM SERPL-SCNC: 3.8 MMOL/L (ref 3.4–5.3)
PROT SERPL-MCNC: 6.6 G/DL (ref 6.8–8.8)
RBC # BLD AUTO: 4.2 10E12/L (ref 3.8–5.2)
RBC #/AREA URNS AUTO: 0 /HPF (ref 0–2)
SODIUM SERPL-SCNC: 139 MMOL/L (ref 133–144)
SOURCE: ABNORMAL
SP GR UR STRIP: 1 (ref 1–1.03)
SQUAMOUS #/AREA URNS AUTO: <1 /HPF (ref 0–1)
TSH SERPL DL<=0.005 MIU/L-ACNC: 1.23 MU/L (ref 0.4–4)
UROBILINOGEN UR STRIP-MCNC: NORMAL MG/DL (ref 0–2)
WBC # BLD AUTO: 5.8 10E9/L (ref 4–11)
WBC #/AREA URNS AUTO: 1 /HPF (ref 0–5)

## 2019-12-30 PROCEDURE — 80320 DRUG SCREEN QUANTALCOHOLS: CPT | Performed by: EMERGENCY MEDICINE

## 2019-12-30 PROCEDURE — 80307 DRUG TEST PRSMV CHEM ANLYZR: CPT | Performed by: EMERGENCY MEDICINE

## 2019-12-30 PROCEDURE — 84443 ASSAY THYROID STIM HORMONE: CPT | Performed by: EMERGENCY MEDICINE

## 2019-12-30 PROCEDURE — 81001 URINALYSIS AUTO W/SCOPE: CPT | Performed by: EMERGENCY MEDICINE

## 2019-12-30 PROCEDURE — 70450 CT HEAD/BRAIN W/O DYE: CPT

## 2019-12-30 PROCEDURE — 82607 VITAMIN B-12: CPT | Performed by: EMERGENCY MEDICINE

## 2019-12-30 PROCEDURE — 85025 COMPLETE CBC W/AUTO DIFF WBC: CPT | Performed by: EMERGENCY MEDICINE

## 2019-12-30 PROCEDURE — 25800030 ZZH RX IP 258 OP 636: Performed by: EMERGENCY MEDICINE

## 2019-12-30 PROCEDURE — 95816 EEG AWAKE AND DROWSY: CPT

## 2019-12-30 PROCEDURE — 83735 ASSAY OF MAGNESIUM: CPT | Performed by: EMERGENCY MEDICINE

## 2019-12-30 PROCEDURE — 99285 EMERGENCY DEPT VISIT HI MDM: CPT | Mod: 25

## 2019-12-30 PROCEDURE — 12000000 ZZH R&B MED SURG/OB

## 2019-12-30 PROCEDURE — 99223 1ST HOSP IP/OBS HIGH 75: CPT | Mod: AI | Performed by: HOSPITALIST

## 2019-12-30 PROCEDURE — 4A10X4Z MONITORING OF CENTRAL NERVOUS ELECTRICAL ACTIVITY, EXTERNAL APPROACH: ICD-10-PCS | Performed by: PSYCHIATRY & NEUROLOGY

## 2019-12-30 PROCEDURE — 96361 HYDRATE IV INFUSION ADD-ON: CPT

## 2019-12-30 PROCEDURE — 40000061 ZZH STATISTIC EEG TIME EA 10 MIN

## 2019-12-30 PROCEDURE — 80053 COMPREHEN METABOLIC PANEL: CPT | Performed by: EMERGENCY MEDICINE

## 2019-12-30 PROCEDURE — 93005 ELECTROCARDIOGRAM TRACING: CPT

## 2019-12-30 PROCEDURE — 96360 HYDRATION IV INFUSION INIT: CPT

## 2019-12-30 RX ORDER — ONDANSETRON 4 MG/1
4 TABLET, ORALLY DISINTEGRATING ORAL EVERY 6 HOURS PRN
Status: DISCONTINUED | OUTPATIENT
Start: 2019-12-30 | End: 2020-01-01 | Stop reason: HOSPADM

## 2019-12-30 RX ORDER — CLONAZEPAM 1 MG/1
1 TABLET ORAL 3 TIMES DAILY PRN
Status: ON HOLD | COMMUNITY
End: 2020-01-01

## 2019-12-30 RX ORDER — LEVOTHYROXINE SODIUM 50 UG/1
50 TABLET ORAL
Status: DISCONTINUED | OUTPATIENT
Start: 2019-12-31 | End: 2020-01-01 | Stop reason: HOSPADM

## 2019-12-30 RX ORDER — VENLAFAXINE HYDROCHLORIDE 150 MG/1
150 CAPSULE, EXTENDED RELEASE ORAL DAILY
Status: DISCONTINUED | OUTPATIENT
Start: 2019-12-31 | End: 2020-01-01

## 2019-12-30 RX ORDER — LIDOCAINE 40 MG/G
CREAM TOPICAL
Status: DISCONTINUED | OUTPATIENT
Start: 2019-12-30 | End: 2020-01-01 | Stop reason: HOSPADM

## 2019-12-30 RX ORDER — AMOXICILLIN 250 MG
2 CAPSULE ORAL 2 TIMES DAILY PRN
Status: DISCONTINUED | OUTPATIENT
Start: 2019-12-30 | End: 2020-01-01 | Stop reason: HOSPADM

## 2019-12-30 RX ORDER — ACETAMINOPHEN 325 MG/1
650 TABLET ORAL EVERY 4 HOURS PRN
Status: DISCONTINUED | OUTPATIENT
Start: 2019-12-30 | End: 2020-01-01 | Stop reason: HOSPADM

## 2019-12-30 RX ORDER — PROPOFOL 10 MG/ML
INJECTION, EMULSION INTRAVENOUS
Status: DISCONTINUED
Start: 2019-12-30 | End: 2019-12-30 | Stop reason: HOSPADM

## 2019-12-30 RX ORDER — NALOXONE HYDROCHLORIDE 0.4 MG/ML
.1-.4 INJECTION, SOLUTION INTRAMUSCULAR; INTRAVENOUS; SUBCUTANEOUS
Status: DISCONTINUED | OUTPATIENT
Start: 2019-12-30 | End: 2020-01-01 | Stop reason: HOSPADM

## 2019-12-30 RX ORDER — LORAZEPAM 2 MG/ML
2 INJECTION INTRAMUSCULAR
Status: DISCONTINUED | OUTPATIENT
Start: 2019-12-30 | End: 2020-01-01 | Stop reason: HOSPADM

## 2019-12-30 RX ORDER — LACTOBACILLUS RHAMNOSUS GG 10B CELL
1 CAPSULE ORAL DAILY
COMMUNITY
End: 2021-05-13

## 2019-12-30 RX ORDER — ONDANSETRON 2 MG/ML
4 INJECTION INTRAMUSCULAR; INTRAVENOUS EVERY 6 HOURS PRN
Status: DISCONTINUED | OUTPATIENT
Start: 2019-12-30 | End: 2020-01-01 | Stop reason: HOSPADM

## 2019-12-30 RX ORDER — METOPROLOL SUCCINATE 50 MG/1
50 TABLET, EXTENDED RELEASE ORAL DAILY
Status: DISCONTINUED | OUTPATIENT
Start: 2019-12-31 | End: 2020-01-01 | Stop reason: HOSPADM

## 2019-12-30 RX ORDER — AMOXICILLIN 250 MG
1 CAPSULE ORAL 2 TIMES DAILY PRN
Status: DISCONTINUED | OUTPATIENT
Start: 2019-12-30 | End: 2020-01-01 | Stop reason: HOSPADM

## 2019-12-30 RX ORDER — SODIUM CHLORIDE 9 MG/ML
1000 INJECTION, SOLUTION INTRAVENOUS CONTINUOUS
Status: DISCONTINUED | OUTPATIENT
Start: 2019-12-30 | End: 2019-12-30

## 2019-12-30 RX ADMIN — SODIUM CHLORIDE 1000 ML: 9 INJECTION, SOLUTION INTRAVENOUS at 12:06

## 2019-12-30 RX ADMIN — SODIUM CHLORIDE 1000 ML: 9 INJECTION, SOLUTION INTRAVENOUS at 17:06

## 2019-12-30 ASSESSMENT — ENCOUNTER SYMPTOMS
FACIAL ASYMMETRY: 0
NUMBNESS: 0
FEVER: 0
VOMITING: 0
WEAKNESS: 0
CONFUSION: 1
COUGH: 0
SHORTNESS OF BREATH: 0
TREMORS: 1
SEIZURES: 1
NAUSEA: 0
SPEECH DIFFICULTY: 0

## 2019-12-30 ASSESSMENT — ACTIVITIES OF DAILY LIVING (ADL)
RETIRED_EATING: 0-->INDEPENDENT
BATHING: 0-->INDEPENDENT
ADLS_ACUITY_SCORE: 12
COGNITION: 0 - NO COGNITION ISSUES REPORTED
TRANSFERRING: 0-->INDEPENDENT
DRESS: 0-->INDEPENDENT
FALL_HISTORY_WITHIN_LAST_SIX_MONTHS: NO
RETIRED_COMMUNICATION: 0-->UNDERSTANDS/COMMUNICATES WITHOUT DIFFICULTY
TOILETING: 0-->INDEPENDENT
AMBULATION: 0-->INDEPENDENT
SWALLOWING: 0-->SWALLOWS FOODS/LIQUIDS WITHOUT DIFFICULTY

## 2019-12-30 NOTE — H&P
[facility]    History and Physical - Hospitalist Service       Date of Admission:  12/30/2019    Assessment & Plan   Megan Bettencourt is a 64 year old female admitted on 12/30/2019 with confusion and memory lapses for past couple months and possible witnessed tonic clonic seizure Sunday morning. She also had some R foot tremor witnessed by ED physician who felt it was involuntary and possible c/w focal seizure.    Suspected seizure  New cognitive deficits, short term memory loss  Head CT unremarkable. Sister at bedside notes significant mental status changes for past couple of months. Pt seems unaware of changes. Electrolytes wnl. Mag high normal. Utox negative.  - Dr Rose from neurology was consulted from ED. Will follow up on any recommendations. Appreciate consult.  - neuro checks Q4h  - blood alcohol level pending  - EEG  - Consider MRI  - seizure precautions  - ativan prn seizure activity  - PT/OT  - check ammonia level in am. Also will check B12 and TSH.  - RF and ELVER ordered with am labs.    Right leg weakness and non volitional episodes of tremor  - h/o surgeries on right foot  - neurology w/u as above.    Depression/anxiety/ADHD: continue venlafaxine. Hold clonazepam as patient hasn't taken in a week. Hold adderall.    Hypothyroid: continue levothyroxine. Check TSH.       Diet: Regular  DVT Prophylaxis: Pneumatic Compression Devices  Kolb Catheter: not present  Code Status: Full code    Disposition Plan   Expected discharge: 2 - 3 days, recommended to dependent on improvement once mental status at baseline.  Entered: Ramila Harrington MD 12/30/2019, 3:53 PM     The patient's care was discussed with the Patient and Patient's Family.    Ramila Harrington MD  [facility]    ______________________________________________________________________    Chief Complaint   Confusion and memory lapses, possible seizure activity    History is obtained from the patient and pt's  "sister.    History of Present Illness   Megan Bettencourt is a 64 year old female admitted on 12/30/2019 with confusion and memory lapses for past couple months and possible witnessed tonic clonic seizure Sunday morning. She also had some R foot tremor witnessed by ED physician who felt it was involuntary and possible c/w focal seizure. Patient is an extremely poor historian.     Patient's sister was called by PCP at [facility] recently given concern about pt's mental status changes. It was suggested patient go to the hospital for head CT and evaluation. Sister mentions significant deficits in short term memory recently. Also poor po intake for the past year. Pt says she hasn't lost a significant amount of weight in this time \"maybe 5 lbs or so.\"    Patient has been staying with a friend for the past few days. On Car morning the friend found patient on the floor next to the bed having generalized tonic clonic movements. No tongue lac or vomiting reported. Patient remembers being on the floor and waking up to her friend being there, but doesn't remember anything else.    Patient reportedly has a history of etoh abuse but no h/o withdrawal and says she quit drinking completely about 2 mos ago. Patient was taking clonazepam 1-3x per day prn for anxiety and sleep. Ran out of this medication about a week ago.     R foot with previous surgeries. Pain recently. Went to a naturopath who wrapped the foot and started magnesium and probiotics. R knee pain and instability started a day ago, per patient. ED MD noted R leg weakness and non volitional movements.    Also noticed a lot of canker sores a few days ago - attributes these to stress. Unemployed - thought she was going to get a job but it recently fell through. Pt essentially attributes all symptoms to stress due to unemployment though she does not seem to recall details of much of what has been going on with her recently.        Review of Systems    The 10 " point Review of Systems is negative other than noted in the HPI or here. No recent fever or chills. No chest pain or SOB.    Past Medical History    I have reviewed this patient's medical history and updated it with pertinent information if needed.   Past Medical History:   Diagnosis Date     Anxiety      Cervical stenosis of spine      Thyroid disease      Vision disturbance        Past Surgical History   I have reviewed this patient's surgical history and updated it with pertinent information if needed.  Past Surgical History:   Procedure Laterality Date     BREAST SURGERY      augmentation      SECTION       COSMETIC BLEPHAROPLASTY LOWER LIDS BILATERAL  2014    Procedure: COSMETIC BLEPHAROPLASTY LOWER LIDS BILATERAL;  Surgeon: Loi Mcpherson MD;  Location: [facility]     COSMETIC SURGERY      neck lift     ORTHOPEDIC SURGERY  2017    bilateral wrists and right heel fracture     REPAIR PTOSIS BILATERAL  2014    Procedure: REPAIR PTOSIS BILATERAL;  Surgeon: Loi Mcpherson MD;  Location: [facility]       Social History   I have reviewed this patient's social history and updated it with pertinent information if needed.  Nonsmoker. Quit drinking completely 2-3 months ago. Prior to that occasional drinker. Lives alone. Currently unemployed.      Family History   I have reviewed this patient's family history and updated it with pertinent information if needed.   Per patient and sister, both parents had heart disease but not until their 80s. No fam hx of diabetes, cancer, or any other familial disorders.    Prior to Admission Medications   Prior to Admission Medications   Prescriptions Last Dose Informant Patient Reported? Taking?   amphetamine-dextroamphetamine (ADDERALL) 10 MG tablet 2019 at Unknown time Self Yes Yes   Sig: Take 10 mg by mouth daily    clonazePAM (KLONOPIN) 1 MG tablet  Self Yes Yes   Sig: Take 1 mg by mouth 3 times daily as needed for anxiety   lactobacillus rhamnosus,  GG, (CULTURELL) capsule 2019 at Unknown time Self Yes Yes   Sig: Take 1 capsule by mouth daily   levothyroxine (SYNTHROID) 50 MCG tablet 2019 at Unknown time Self Yes Yes   Sig: Take 50 mcg by mouth daily    metoprolol succinate ER (TOPROL-XL) 50 MG 24 hr tablet 2019 at Unknown time Self Yes Yes   Sig: Take 50 mg by mouth daily    venlafaxine (EFFEXOR-XR) 150 MG 24 hr capsule 2019 at Unknown time Self Yes Yes   Sig: Take 150 mg by mouth daily      Facility-Administered Medications: None     Allergies   Allergies   Allergen Reactions     Morphine Itching     iv morphine after         Hydrocodone-Acetaminophen Itching     In higher doses    In higher doses     Morphine Hcl Itching       Physical Exam   Vital Signs: Temp: 98.4  F (36.9  C) Temp src: Oral BP: 122/78 Pulse: 60 Heart Rate: 60 Resp: 14 SpO2: 100 % O2 Device: None (Room air)    Weight: 0 lbs 0 oz    Constitutional: awake, alert, cooperative, no apparent distress, and appears stated age  Eyes: Lids and lashes normal, pupils equal, round and reactive to light, extra ocular muscles intact, sclera clear, conjunctiva normal  Respiratory: No increased work of breathing, good air exchange, clear to auscultation bilaterally, no crackles or wheezing  Cardiovascular: Normal apical impulse, regular rate and rhythm, normal S1 and S2, no S3 or S4, and no murmur noted  GI: No scars, normal bowel sounds, soft, non-distended, non-tender, no masses palpated, no hepatosplenomegally  Skin: normal skin color, texture, turgor  Musculoskeletal: no lower extremity pitting edema present  there is no redness, warmth, or swelling of the joints  full range of motion noted  tone is normal  Neurologic: Mental Status Exam:  Level of Alertness:   awake  Orientation:   person, place, time  Memory:   abnormal - poor short term memory, unable to recall some recent events.  Attention/Concentration:  abnormal - did not answer questions directly.  Language:   abnormal - coherent but certain answers to questions just did not make sense.  Neuropsychiatric: General: normal, calm and normal eye contact  Affect: normal and pleasant    Data   Data reviewed today: I reviewed all medications, new labs and imaging results over the last 24 hours. I personally reviewed the EKG tracing showing mild bradycardia, no ischemic changes.    Recent Labs   Lab 12/30/19  1204   WBC 5.8   HGB 13.5            POTASSIUM 3.8   CHLORIDE 105   CO2 30   BUN 5*   CR 0.66   ANIONGAP 4   DENISA 8.8   *   ALBUMIN 3.4   PROTTOTAL 6.6*   BILITOTAL 0.6   ALKPHOS 62   ALT 19   AST 28     Recent Results (from the past 24 hour(s))   Head CT w/o contrast    Narrative    CT SCAN OF THE HEAD WITHOUT CONTRAST December 30, 2019 12:28 PM     HISTORY: Right leg shaking, confusion/memory issue.    TECHNIQUE: Axial images of the head and coronal reformations without  IV contrast material. Radiation dose for this scan was reduced using  automated exposure control, adjustment of the mA and/or kV according  to patient size, or iterative reconstruction technique.    COMPARISON: Head CT 3/20/2019.    FINDINGS: There is no evidence of intracranial hemorrhage, mass, acute  infarct or anomaly. The ventricles are normal in size, shape and  configuration. The brain parenchyma and subarachnoid spaces are  normal.     The visualized portions of the sinuses and mastoids appear normal. The  bony calvarium and bones of the skull base appear intact.       Impression    IMPRESSION: No evidence of acute intracranial hemorrhage, mass, or  herniation.    LUCY BERNARD MD

## 2019-12-30 NOTE — ED PROVIDER NOTES
History     Chief Complaint:  Altered Mental Status    HPI  Megan Bettencourt is a 64 year old female with a history of anxiety who presents to the emergency department today for evaluation of altered mental status. She had been staying at a friend's house over the holidays and has been noticing some mental cloudiness and confusion for several days. Her right foot also has an intermittent tremor which she states is new within the last 2 days. She and her friend saw a naturopathic practitioner for this who put some wrap on her foot. Her friend reports seeing the patient have a possible seizure 2 days ago with rhythmic movement, loss of consciousness and strange breathing and snoring afterwards. There was also a possible fall over the weekend but the patient and her friend are poor historians. She denies any fever, cough, vomiting or diarrhea. She has also not noticed any speech difficulty, numbness, weakness, or other neurological deficits. Her only medication change has been starting a magnesium supplement (Natural Calm) to held her sleep.       Allergies  Morphine  Hydrocodone-Acetaminophen  Morphine Hcl    Medications:    Adderall  Synthroid  Metoprolol  Effexor    Past Medical History:    Anxiety  Cervical stenosis of spine  Thyroid disease  Blunt trauma of multiple sites  Pneumothorax    Past Surgical History:     section  Breast surgery  Cosmetic surgery  Repair ptosis bilateral  Cosmetic blepharoplasty lower lids bilateral  orthopedic surgery     Family History:    History reviewed. No pertinent family history.     Social History:  The patient reports that she has never smoked. She has never used smokeless tobacco. She reports current alcohol use. She reports that she does not use drugs.   PCP: Gunnar Sarkar  Marital Status: Single [1]      Review of Systems   Constitutional: Negative for fever.   Respiratory: Negative for cough and shortness of breath.    Cardiovascular: Negative for chest  pain.   Gastrointestinal: Negative for nausea and vomiting.   Neurological: Positive for tremors and seizures. Negative for facial asymmetry, speech difficulty, weakness and numbness.   Psychiatric/Behavioral: Positive for confusion.   All other systems reviewed and are negative.    Physical Exam     Patient Vitals for the past 24 hrs:   BP Temp Temp src Pulse Heart Rate Resp SpO2   12/30/19 1153 122/78 98.4  F (36.9  C) Oral 60 60 14 100 %     Physical Exam  General: Well-nourished, appears to be resting comfortably when I enter the room  Eyes: PERRL, conjunctivae pink no scleral icterus or conjunctival injection  ENT:  Moist mucus membranes, posterior oropharynx clear without erythema or exudates  Respiratory:  Lungs clear to auscultation bilaterally, no crackles/rubs/wheezes.  Good air movement  CV: Normal rate and rhythm, no murmurs/rubs/gallops  GI:  Abdomen soft and non-distended.  Normoactive BS.  No tenderness, guarding or rebound  Skin: Warm, dry.  No rashes or petechiae  Musculoskeletal: No peripheral edema or calf tenderness  Neuro: Alert and oriented to person/place/time but seems confused and has poor memory for events of recent days. PERRL, EOMI no nystagmus, no aphasia/facial droop/dysarthria, tongue midline, symmetric palatal elevation, ?decreased strength RLE. normal strength at SCM/trapezius/BUE/LLE, normal coordination to FNF at BUE, gait deferred, negative romberg, sensation intact to LT over face/BUE/BLE. During interview she has an episode of right leg shaking that appears non-volitional and last for approximately a minute.  No generalized seizure activity.  Shaking ceased.  Psychiatric: Normal affect    Emergency Department Course     ECG:  ECG taken at 1206, ECG read at 1208  Sinus bradycardia  Low voltage QRS  Borderline ECG  Rate 57 bpm. NM interval 170 ms. QRS duration 84 ms. QT/QTc 426/414 ms. P-R-T axes 75 57 68.    Imaging:  Radiology findings were communicated with the patient who  voiced understanding of the findings.    CT Head wo contrast  IMPRESSION: No evidence of acute intracranial hemorrhage, mass, or  Herniation  Reading per radiology    Laboratory:  Laboratory findings were communicated with the patient who voiced understanding of the findings.    CBC:  o/w WNL. (WBC 5.8, HGB 13.5, )   CMP: Glucose 128 (H), BUN 5 (L), o/w WNL (Creatinine: 0.66)    UA: mucus present, o/w Negative    Interventions:  1206 NS 1L IV Bolus    Emergency Department Course:  Past medical records, nursing notes, and vitals reviewed.  1154: I performed an exam of the patient and obtained history, as documented above.     IV was inserted and blood was drawn for laboratory testing, results above.  The patient was sent for a head CT while in the emergency department, findings above.    I personally reviewed the laboratory/imaging results with the Patient and sister and answered all related questions prior to admission.    1420: Discussed the patient with Rose Iglesias of neurology who will the patient once admitted    Findings and plan explained to the Patient who consents to admission.     1436: Discussed the patient with Dr. Harrington, who will admit the patient to a neuro bed for further monitoring, evaluation, and treatment.     Impression & Plan        Medical Decision Making:  Megan Bettencourt is a 64 year old female who presents to the emergency department today for evaluation of confusion or memory lapses over the recent days along with what sounds like a possible seizure and what appears to be a potential focal seizure of the right lower extremity while in the emergency department.  Head CT shows no evidence of bleeding or abnormality.  Electrolytes are reassuring.  She has been taking some magnesium but denies drinking alcohol, taking any drugs or other homeopathic medications.  She does not seem to be going through alcohol withdrawal.  She is not tachycardic nor hypertensive here.  She did not  have generalized tremulousness.  Given her symptoms, I do feel she will need neurologic consultation and MRI with EEG as well.  I spoke with Dr. Rose of neurology who will consult on the patient.  I spoke with Dr. Harrington of the hospital service who graciously agreed to admit the patient.  She will go to the neurologic floor.  The patient was updated and agreed with the plan.    Diagnosis:    ICD-10-CM    1. Transient alteration of awareness R40.4 Drug abuse screen 77 urine ([place], , [place]     Drug abuse screen 77 urine [place], , [place]     CANCELED: Drug abuse screen urine   2. Episode of shaking R25.1        Disposition:   Admitted to neurology      Scribe Disclosure:  TING Xuandamian Quintanilla, am serving as a scribe at 11:54 AM on 12/30/2019 to document services personally performed by Deena Van MD based on my observations and the provider's statements to me.     EMERGENCY DEPARTMENT       Deena Van MD  12/30/19 1957

## 2019-12-30 NOTE — PHARMACY-ADMISSION MEDICATION HISTORY
Pharmacy Medication History  Admission medication history interview status for the 12/30/2019  admission is complete. See EPIC admission navigator for prior to admission medications     Medication history sources: Patient  Medication history source reliability: Good  Adherence assessment: Good    Significant changes made to the medication list:  None      Additional medication history information:   None    Medication reconciliation completed by provider prior to medication history? No    Time spent in this activity: None      Prior to Admission medications    Medication Sig Last Dose Taking? Auth Provider   Amphetamine-Dextroamphetamine (ADDERALL PO) Take 10 mg by mouth daily  12/29/2019 at Unknown time Yes Reported, Patient   clonazePAM (KLONOPIN) 1 MG tablet Take 1 mg by mouth 3 times daily as needed for anxiety  Yes Unknown, Entered By History   lactobacillus rhamnosus, GG, (CULTURELL) capsule Take 1 capsule by mouth daily 12/29/2019 at Unknown time Yes Unknown, Entered By History   Levothyroxine Sodium (SYNTHROID PO) Take 50 mcg by mouth daily  12/30/2019 at Unknown time Yes Reported, Patient   METOPROLOL SUCCINATE ER PO Take 50 mg by mouth  12/30/2019 at Unknown time Yes Reported, Patient   venlafaxine (EFFEXOR-XR) 150 MG 24 hr capsule Take 150 mg by mouth daily 12/30/2019 at Unknown time Yes Unknown, Entered By History

## 2019-12-30 NOTE — PROGRESS NOTES
STAT Portable EEG  completed at patient's bedside. Procedure explained to patient prior to testing.   Ordered by Dr Van

## 2019-12-30 NOTE — PROGRESS NOTES
RECEIVING UNIT ED HANDOFF REVIEW    ED Nurse Handoff Report was reviewed by: Loi Padgett RN on December 30, 2019 at 4:09 PM

## 2019-12-30 NOTE — ED TRIAGE NOTES
Patient reports she was at her friends house for the last 3 days. She was going to neuropathic doctor for her broken foot. Patient says she became confused with memory issues since 12/26.

## 2019-12-30 NOTE — ED NOTES
"[facility]  ED Nurse Handoff Report    ED Chief complaint: Altered Mental Status      ED Diagnosis:   Final diagnoses:   None       Code Status: Full Code    Allergies:   Allergies   Allergen Reactions     Morphine Itching     iv morphine after         Hydrocodone-Acetaminophen Itching     In higher doses    In higher doses     Morphine Hcl Itching       Activity level - Baseline/Home:  Independent  Activity Level - Current:   Independent    Patient's Preferred language: english   Needed?: No    Isolation: No  Infection: Not Applicable  Bariatric?: No    Vital Signs:   Vitals:    19 1153   BP: 122/78   Pulse: 60   Resp: 14   Temp: 98.4  F (36.9  C)   TempSrc: Oral   SpO2: 100%       Cardiac Rhythm: ,        Pain level:      Is this patient confused?: Yes, Last 4 days have been \"blur\". Hard time remembering things that should come easy to patient   Does this patient have a guardian?  No         If yes, is there guardianship documents in the Epic \"Code/ACP\" activity?  N/A         Guardian Notified?  N/A  [place] - Suicide Severity Rating Scale Completed?  Yes  If yes, what color did the patient score?  White    Patient Report: Initial Complaint: Pt reports past 4 days have been blur. Pt reports staying at friends house over the weekend, but can only remember bits and pieces. Visited [facility] practitioner on Saturday for right foot treatment, also recently started magnesium supplement.   Focused Assessment: +Confusion an right foot tremors  Tests Performed: Labs and CT  Abnormal Results: Mag 2.5  Treatments provided: 1L NS    Family Comments: Sister at bedside    OBS brochure/video discussed/provided to patient/family: Yes              Name of person given brochure if not patient: Na              Relationship to patient: NA    ED Medications:   Medications   0.9% sodium chloride BOLUS (1,000 mLs Intravenous New Bag 19 1206)     Followed by   sodium chloride " 0.9% infusion (has no administration in time range)   propofol (DIPRIVAN) 200 MG/20ML injection (has no administration in time range)       Drips infusing?:  No    For the majority of the shift this patient was Green.   Interventions performed were None.    Severe Sepsis OR Septic Shock Diagnosis Present: No    To be done/followed up on inpatient unit:  Cont to monitor    ED NURSE PHONE NUMBER: [phone]

## 2019-12-31 ENCOUNTER — APPOINTMENT (OUTPATIENT)
Dept: MRI IMAGING | Facility: CLINIC | Age: 64
DRG: 101 | End: 2019-12-31
Attending: PSYCHIATRY & NEUROLOGY
Payer: COMMERCIAL

## 2019-12-31 ENCOUNTER — APPOINTMENT (OUTPATIENT)
Dept: OCCUPATIONAL THERAPY | Facility: CLINIC | Age: 64
DRG: 101 | End: 2019-12-31
Attending: HOSPITALIST
Payer: COMMERCIAL

## 2019-12-31 LAB
AMMONIA PLAS-SCNC: 20 UMOL/L (ref 10–50)
ANION GAP SERPL CALCULATED.3IONS-SCNC: 3 MMOL/L (ref 3–14)
BUN SERPL-MCNC: 6 MG/DL (ref 7–30)
CALCIUM SERPL-MCNC: 8.5 MG/DL (ref 8.5–10.1)
CHLORIDE SERPL-SCNC: 112 MMOL/L (ref 94–109)
CO2 SERPL-SCNC: 28 MMOL/L (ref 20–32)
CREAT SERPL-MCNC: 0.6 MG/DL (ref 0.52–1.04)
FOLATE SERPL-MCNC: 10.1 NG/ML
GFR SERPL CREATININE-BSD FRML MDRD: >90 ML/MIN/{1.73_M2}
GLUCOSE SERPL-MCNC: 96 MG/DL (ref 70–99)
POTASSIUM SERPL-SCNC: 4 MMOL/L (ref 3.4–5.3)
SODIUM SERPL-SCNC: 143 MMOL/L (ref 133–144)
VIT B12 SERPL-MCNC: 319 PG/ML (ref 193–986)

## 2019-12-31 PROCEDURE — A9585 GADOBUTROL INJECTION: HCPCS | Performed by: INTERNAL MEDICINE

## 2019-12-31 PROCEDURE — 25000132 ZZH RX MED GY IP 250 OP 250 PS 637: Performed by: HOSPITALIST

## 2019-12-31 PROCEDURE — 25000128 H RX IP 250 OP 636: Performed by: HOSPITALIST

## 2019-12-31 PROCEDURE — 97165 OT EVAL LOW COMPLEX 30 MIN: CPT | Mod: GO

## 2019-12-31 PROCEDURE — 70553 MRI BRAIN STEM W/O & W/DYE: CPT

## 2019-12-31 PROCEDURE — 40000061 ZZH STATISTIC EEG TIME EA 10 MIN

## 2019-12-31 PROCEDURE — 95816 EEG AWAKE AND DROWSY: CPT

## 2019-12-31 PROCEDURE — 12000000 ZZH R&B MED SURG/OB

## 2019-12-31 PROCEDURE — 82746 ASSAY OF FOLIC ACID SERUM: CPT | Performed by: PSYCHIATRY & NEUROLOGY

## 2019-12-31 PROCEDURE — 25500064 ZZH RX 255 OP 636: Performed by: INTERNAL MEDICINE

## 2019-12-31 PROCEDURE — 36415 COLL VENOUS BLD VENIPUNCTURE: CPT | Performed by: HOSPITALIST

## 2019-12-31 PROCEDURE — 86038 ANTINUCLEAR ANTIBODIES: CPT | Performed by: HOSPITALIST

## 2019-12-31 PROCEDURE — 80048 BASIC METABOLIC PNL TOTAL CA: CPT | Performed by: HOSPITALIST

## 2019-12-31 PROCEDURE — 86431 RHEUMATOID FACTOR QUANT: CPT | Performed by: HOSPITALIST

## 2019-12-31 PROCEDURE — 84425 ASSAY OF VITAMIN B-1: CPT | Performed by: PSYCHIATRY & NEUROLOGY

## 2019-12-31 PROCEDURE — 82140 ASSAY OF AMMONIA: CPT | Performed by: HOSPITALIST

## 2019-12-31 PROCEDURE — 99232 SBSQ HOSP IP/OBS MODERATE 35: CPT | Performed by: INTERNAL MEDICINE

## 2019-12-31 RX ORDER — LORAZEPAM 2 MG/ML
0.5 INJECTION INTRAMUSCULAR
Status: COMPLETED | OUTPATIENT
Start: 2019-12-31 | End: 2019-12-31

## 2019-12-31 RX ORDER — GADOBUTROL 604.72 MG/ML
6.6 INJECTION INTRAVENOUS ONCE
Status: COMPLETED | OUTPATIENT
Start: 2019-12-31 | End: 2019-12-31

## 2019-12-31 RX ADMIN — METOPROLOL SUCCINATE 50 MG: 50 TABLET, EXTENDED RELEASE ORAL at 09:47

## 2019-12-31 RX ADMIN — LEVOTHYROXINE SODIUM 50 MCG: 50 TABLET ORAL at 06:30

## 2019-12-31 RX ADMIN — VENLAFAXINE HYDROCHLORIDE 150 MG: 150 CAPSULE, EXTENDED RELEASE ORAL at 09:47

## 2019-12-31 RX ADMIN — LORAZEPAM 0.5 MG: 2 INJECTION INTRAMUSCULAR; INTRAVENOUS at 18:08

## 2019-12-31 RX ADMIN — GADOBUTROL 6.6 ML: 604.72 INJECTION INTRAVENOUS at 18:24

## 2019-12-31 ASSESSMENT — ACTIVITIES OF DAILY LIVING (ADL)
ADLS_ACUITY_SCORE: 12
PREVIOUS_RESPONSIBILITIES: MEAL PREP;HOUSEKEEPING;LAUNDRY;MEDICATION MANAGEMENT;FINANCES;DRIVING;WORK;SHOPPING
ADLS_ACUITY_SCORE: 12
ADLS_ACUITY_SCORE: 12

## 2019-12-31 NOTE — PLAN OF CARE
Pt here with focal seizure in right foot. A&O. Neuros intact and unchanged. VSS. Tele NSR. Regular diet, regular liquids. Takes pills whole. Up with SBA. Denies pain. Pt scoring green on the Aggression Stop Light Tool. Plan for MRI in the AM, EEG and neuro consult. Discharge in 1-2 days.

## 2019-12-31 NOTE — PLAN OF CARE
Patient arrived on floor around 1630 from ED where she had a focal seizure. A&Ox4. SBA/IND, fall risk for seizure precautions. Regular diet. Calls appropriately. IVF running to R PIV. Tele: NSR. Continue to monitor.

## 2019-12-31 NOTE — CONSULTS
[facility]    Neurology Consultation     Date of Admission:  12/30/2019    Assessment & Plan   Megan Bettencourt is a 64 year old female who was admitted on 12/30/2019. I was asked to see the patient for witnessed tonic-clonic seizure.    Patient has sleep deprivation for 2 days, with skipping meals and ran out of clonazepam about 1 week ago.    In the ER vital signs were stable, blood alcohol level undetectable,Utox negative, normal Lab (sodium 139, glucose 128, Ca 8.8), unremarkable Head CT.    Denied family history of seizure, prior history of seizure, meningitis/encephalitis or head trauma or recent fever/illness.    Except for mild positive Romberg sign, no focal neurologic deficit was noted.    #Possibly provoked seizure in the presence of sleep deprivation, and benzodiazepine withdrawal, does not have any sign of Wernicke encephlaopthy  -EEG-pending  -MR-pending  -Hold off antiepileptic at this point  -B12, folate, Thiamin level- pending    #Tremulous right leg  - distractible-likely likely psychogenic vs essential tremor  - TSH -WNL    Yvonne Rose    Code Status    Full Code    Reason for Consult   Reason for consult: I was asked by primary team to evaluate this patient for possible seizure.    Primary Care Physician   Gunnar Sarkar    Chief Complaint     History is obtained from the patient    History of Present Illness   Megan Bettencourt is a 64 year old female who presents with  witnessed tonic-clonic seizure.    Patient has a history of alcohol abuse in the past and has completed rehabilitation about 1 year ago.  According to the patient, the patient has not been drinking heavily since then except for social drinking. (Prior to rehab she used to drink couple of bhargavi every day and after rehab she drinks couple of drinks over a week). Per ER note, the patient quit drinking completely about 2 mos ago. Patient was taking clonazepam 1-3x per day prn for anxiety and sleep for the past  "2 years. Ran out of this medication about a week ago.     According to the patient, she has not been sleeping for the past 2 days due to anxiety (only taking couple hours of sleep), has not been eating.    Per her sister at bedside notes significant mental status changes for past couple of months and Her right foot also has an intermittent tremor which she states is new within the last 2 days(ER note).      Patient has been staying with a friend for the past few days. On  morning the friend found patient on the floor next to the bed having generalized tonic clonic movements. No tongue lac or vomiting or incontinence reported. Patient remembers being on the floor and waking up to her friend being there, but doesn't remember otherwise (ER note) and described it \"foggy\".    In the ER vital signs were stable, blood alcohol level undetectable,Utox negative, normal Lab ( sodium 139, glucose 128, Ca 8.8) , unremarkable Head CT.  Denied family history of seizure, prior history of seizure, meningitis/encephalitis or head trauma or recent fever/illness.    Past Medical History   I have reviewed this patient's medical history and updated it with pertinent information if needed.   Past Medical History:   Diagnosis Date     Anxiety      Cervical stenosis of spine      Thyroid disease      Vision disturbance        Past Surgical History   I have reviewed this patient's surgical history and updated it with pertinent information if needed.  Past Surgical History:   Procedure Laterality Date     BREAST SURGERY      augmentation      SECTION       COSMETIC BLEPHAROPLASTY LOWER LIDS BILATERAL  2014    Procedure: COSMETIC BLEPHAROPLASTY LOWER LIDS BILATERAL;  Surgeon: Loi Mcpherson MD;  Location: [facility]     COSMETIC SURGERY      neck lift     ORTHOPEDIC SURGERY  2017    bilateral wrists and right heel fracture     REPAIR PTOSIS BILATERAL  2014    Procedure: REPAIR PTOSIS BILATERAL;  Surgeon: Ky" Loi Roy MD;  Location: [facility]       Prior to Admission Medications   Prior to Admission Medications   Prescriptions Last Dose Informant Patient Reported? Taking?   amphetamine-dextroamphetamine (ADDERALL) 10 MG tablet 2019 at Unknown time Self Yes Yes   Sig: Take 10 mg by mouth daily    clonazePAM (KLONOPIN) 1 MG tablet  Self Yes Yes   Sig: Take 1 mg by mouth 3 times daily as needed for anxiety   lactobacillus rhamnosus, GG, (CULTURELL) capsule 2019 at Unknown time Self Yes Yes   Sig: Take 1 capsule by mouth daily   levothyroxine (SYNTHROID) 50 MCG tablet 2019 at Unknown time Self Yes Yes   Sig: Take 50 mcg by mouth daily    metoprolol succinate ER (TOPROL-XL) 50 MG 24 hr tablet 2019 at Unknown time Self Yes Yes   Sig: Take 50 mg by mouth daily    venlafaxine (EFFEXOR-XR) 150 MG 24 hr capsule 2019 at Unknown time Self Yes Yes   Sig: Take 150 mg by mouth daily      Facility-Administered Medications: None     Allergies   Allergies   Allergen Reactions     Morphine Itching     iv morphine after         Hydrocodone-Acetaminophen Itching     In higher doses    In higher doses     Morphine Hcl Itching       Social History   I have reviewed this patient's social history and updated it with pertinent information if needed. Megan Bettencourt  reports that she has never smoked. She has never used smokeless tobacco. She reports current alcohol use. She reports that she does not use drugs.    Family History   I have reviewed this patient's family history and updated it with pertinent information if needed.   No family history on file.    Review of Systems   The 10 point Review of Systems is negative other than noted in the HPI or here.     Patient denies any LOC, HA, vision change (double vision/blurry vision/ vision loss), dizziness, imbalance, nausea, vomiting, dysphagia, dysarthria, weakness, numbness, tingling, incontinence, saddle anesthesia, prior seizure, stroke, trauma,  brain surgery, weight change, recent illness, bleeding, bruising, fever, diarrhea, chest pain or shortness breath    Physical Exam   Temp: 98  F (36.7  C) Temp src: Oral BP: 121/72 Pulse: 60 Heart Rate: 61 Resp: 16 SpO2: 98 % O2 Device: None (Room air)    Vital Signs with Ranges  Temp:  [98  F (36.7  C)-98.4  F (36.9  C)] 98  F (36.7  C)  Pulse:  [60] 60  Heart Rate:  [59-61] 61  Resp:  [14-16] 16  BP: (121-126)/(72-78) 121/72  SpO2:  [96 %-100 %] 98 %  0 lbs 0 oz    Cardiovascular: No Carotid bruit   Skin: No rash  Eye: Anicteric, no conjunctival injection    Memory: recent and remote memory grossly intact during conversation  Language: intact fluency, comprehension, repetition and naming  Concentration: intact during conversation  Fund of knowledge: Good     General appearance:No acute distress, appears anxious  Neuro/Psych:Awake, alert, oriented x3.   Cranial nerves: II-XII intact   Fundi/Optic disc:   PERRLA, Extraocular movements intact. Visual field intact, No nystagmus, Face appears symmetric. Facial sensation intact. Hearing intact to finger rub. Palate midline. Shoulder shrug/SCM intact bilaterally. Tongue at midline with no fasciculations  Motor strength: 5/5 throughout.   Range of motion: Full       Tone/ position: Normal    Sensory: symmetric and intact to temperature/ pain, light touch/ position/ vibration,   Reflexes: 2+ throughout  Babinski/Clonus/Cardenas: absent.   Coordination: Finger-nose coordination intact  Romberg  positive  Gait: normal native/tandem/heel/toe gait.       Data   Results for orders placed or performed during the hospital encounter of 12/30/19 (from the past 24 hour(s))   CBC with platelets differential   Result Value Ref Range    WBC 5.8 4.0 - 11.0 10e9/L    RBC Count 4.20 3.8 - 5.2 10e12/L    Hemoglobin 13.5 11.7 - 15.7 g/dL    Hematocrit 41.9 35.0 - 47.0 %     78 - 100 fl    MCH 32.1 26.5 - 33.0 pg    MCHC 32.2 31.5 - 36.5 g/dL    RDW 14.1 10.0 - 15.0 %    Platelet Count  337 150 - 450 10e9/L    Diff Method Automated Method     % Neutrophils 64.4 %    % Lymphocytes 21.0 %    % Monocytes 12.7 %    % Eosinophils 1.4 %    % Basophils 0.3 %    % Immature Granulocytes 0.2 %    Nucleated RBCs 0 0 /100    Absolute Neutrophil 3.7 1.6 - 8.3 10e9/L    Absolute Lymphocytes 1.2 0.8 - 5.3 10e9/L    Absolute Monocytes 0.7 0.0 - 1.3 10e9/L    Absolute Eosinophils 0.1 0.0 - 0.7 10e9/L    Absolute Basophils 0.0 0.0 - 0.2 10e9/L    Abs Immature Granulocytes 0.0 0 - 0.4 10e9/L    Absolute Nucleated RBC 0.0    Comprehensive metabolic panel   Result Value Ref Range    Sodium 139 133 - 144 mmol/L    Potassium 3.8 3.4 - 5.3 mmol/L    Chloride 105 94 - 109 mmol/L    Carbon Dioxide 30 20 - 32 mmol/L    Anion Gap 4 3 - 14 mmol/L    Glucose 128 (H) 70 - 99 mg/dL    Urea Nitrogen 5 (L) 7 - 30 mg/dL    Creatinine 0.66 0.52 - 1.04 mg/dL    GFR Estimate >90 >60 mL/min/[1.73_m2]    GFR Estimate If Black >90 >60 mL/min/[1.73_m2]    Calcium 8.8 8.5 - 10.1 mg/dL    Bilirubin Total 0.6 0.2 - 1.3 mg/dL    Albumin 3.4 3.4 - 5.0 g/dL    Protein Total 6.6 (L) 6.8 - 8.8 g/dL    Alkaline Phosphatase 62 40 - 150 U/L    ALT 19 0 - 50 U/L    AST 28 0 - 45 U/L   Magnesium   Result Value Ref Range    Magnesium 2.5 (H) 1.6 - 2.3 mg/dL   Alcohol ethyl   Result Value Ref Range    Ethanol g/dL <0.01 <0.01 g/dL   TSH with free T4 reflex   Result Value Ref Range    TSH 1.23 0.40 - 4.00 mU/L   EKG 12-lead, tracing only   Result Value Ref Range    Interpretation ECG Click View Image link to view waveform and result    Head CT w/o contrast    Narrative    CT SCAN OF THE HEAD WITHOUT CONTRAST December 30, 2019 12:28 PM     HISTORY: Right leg shaking, confusion/memory issue.    TECHNIQUE: Axial images of the head and coronal reformations without  IV contrast material. Radiation dose for this scan was reduced using  automated exposure control, adjustment of the mA and/or kV according  to patient size, or iterative reconstruction  technique.    COMPARISON: Head CT 3/20/2019.    FINDINGS: There is no evidence of intracranial hemorrhage, mass, acute  infarct or anomaly. The ventricles are normal in size, shape and  configuration. The brain parenchyma and subarachnoid spaces are  normal.     The visualized portions of the sinuses and mastoids appear normal. The  bony calvarium and bones of the skull base appear intact.       Impression    IMPRESSION: No evidence of acute intracranial hemorrhage, mass, or  herniation.    LUCY BERNARD MD   UA with Microscopic   Result Value Ref Range    Color Urine Yellow     Appearance Urine Clear     Glucose Urine Negative NEG^Negative mg/dL    Bilirubin Urine Negative NEG^Negative    Ketones Urine Negative NEG^Negative mg/dL    Specific Gravity Urine 1.004 1.003 - 1.035    Blood Urine Negative NEG^Negative    pH Urine 7.0 5.0 - 7.0 pH    Protein Albumin Urine Negative NEG^Negative mg/dL    Urobilinogen mg/dL Normal 0.0 - 2.0 mg/dL    Nitrite Urine Negative NEG^Negative    Leukocyte Esterase Urine Negative NEG^Negative    Source Midstream Urine     WBC Urine 1 0 - 5 /HPF    RBC Urine 0 0 - 2 /HPF    Squamous Epithelial /HPF Urine <1 0 - 1 /HPF    Mucous Urine Present (A) NEG^Negative /LPF   Drug abuse screen 77 urine ([place], [place], SH)   Result Value Ref Range    Amphetamine Qual Urine Negative NEG^Negative    Barbiturates Qual Urine Negative NEG^Negative    Benzodiazepine Qual Urine Negative NEG^Negative    Cannabinoids Qual Urine Negative NEG^Negative    Cocaine Qual Urine Negative NEG^Negative    Opiates Qualitative Urine Negative NEG^Negative    PCP Qual Urine Negative NEG^Negative       total face to face time : 70 minutes  More than 50% of the time was spent on counseling on the Pathophysiology of the shaking episode, differential diagnosis and possible causes and Risk factor management of the seizure.

## 2019-12-31 NOTE — PROGRESS NOTES
[facility]    Medicine Progress Note - Hospitalist Service       Date of Admission:  12/30/2019  Assessment & Plan   Megan Bettencourt is a 64 year old female admitted on 12/30/2019 with confusion and memory lapses for past couple months and possible witnessed tonic clonic seizure on sunday morning. She also had some R foot tremor witnessed by ED physician who felt it was involuntary and possible c/w focal seizure. She was admitted and neurology was consulted      Suspected seizure, New cognitive deficits, short term memory loss  Head CT unremarkable. Sister at bedside notes significant mental status changes for past couple of months. Pt seems unaware of changes. Utox negative. She stays that she stopped taking clonazepam a week ago which might have let to the seizure  - Dr Rose from neurology was consulted from ED and made recommendation  - neuro checks Q4h  - EEG completed, result pending  - MRI pending  - blood alcohol level less than 0.01  -  ammonia 20   -  B12 and TSH within NL  -  RF and ELVER pending  -  seizure precautions  -  ativan prn seizure activity  -  PT/OT    Right leg weakness and non volitional episodes of tremor  - h/o surgeries on right foot  - neurology w/u as above.     Depression/anxiety/ADHD:   continue venlafaxine.  Hold clonazepam as patient hasn't taken in a week. Hold adderall.     Hypothyroid: continue levothyroxine. TSH within NL        Diet: Regular  DVT Prophylaxis: Pneumatic Compression Devices  Kolb Catheter: not present  Code Status: Full code    Disposition Plan   Expected discharge: 2 - 3 days, recommended to prior living arrangement once mental status back to baseline and cause for her presentation determined.  Entered: Jaquan Garces MD 12/31/2019, 11:42 AM       The patient's care was discussed with the Bedside Nurse and Patient.    Jaquan Garces MD  Hospitalist Service  [facility]  Hospital    ______________________________________________________________________    Interval History   Possible focal seizure right foot reported. MRI pending. No new symptoms, Feels she is at her baseline    Data reviewed today: I reviewed all medications, new labs and imaging results over the last 24 hours. I personally reviewed the brain MRI image(s) showing Pending.    Physical Exam   Vital Signs: Temp: 98  F (36.7  C) Temp src: Oral BP: 125/76 Pulse: 77 Heart Rate: 65 Resp: 16 SpO2: 93 % O2 Device: None (Room air)    Weight: 0 lbs 0 oz  Exam:  Constitutional: alert, no distress and cooperative  Head  normocephalic   Neck: Neck supple.  No JVD  ENT: Pupils symmetrical, sclera anicteric,  Cardiac: RRR. No murmurs, clicks gallops or rub,   Respiratory: Breathing comfortably. Lungs clear  Gastrointestinal: Abdomen soft, non-tender. BS normal.  : no Kolb  Musculoskeletal: extremities normal- no gross deformities noted. No clubbing  Skin: no rashes, extremities edema: none.   Neurologic:oriented and appropriate, grossly non focal.      Data   Recent Labs   Lab 12/31/19  0758 12/30/19  1204   WBC  --  5.8   HGB  --  13.5   MCV  --  100   PLT  --  337    139   POTASSIUM 4.0 3.8   CHLORIDE 112* 105   CO2 28 30   BUN 6* 5*   CR 0.60 0.66   ANIONGAP 3 4   DENISA 8.5 8.8   GLC 96 128*   ALBUMIN  --  3.4   PROTTOTAL  --  6.6*   BILITOTAL  --  0.6   ALKPHOS  --  62   ALT  --  19   AST  --  28     Recent Results (from the past 24 hour(s))   Head CT w/o contrast    Narrative    CT SCAN OF THE HEAD WITHOUT CONTRAST December 30, 2019 12:28 PM     HISTORY: Right leg shaking, confusion/memory issue.    TECHNIQUE: Axial images of the head and coronal reformations without  IV contrast material. Radiation dose for this scan was reduced using  automated exposure control, adjustment of the mA and/or kV according  to patient size, or iterative reconstruction technique.    COMPARISON: Head CT 3/20/2019.    FINDINGS: There is no  evidence of intracranial hemorrhage, mass, acute  infarct or anomaly. The ventricles are normal in size, shape and  configuration. The brain parenchyma and subarachnoid spaces are  normal.     The visualized portions of the sinuses and mastoids appear normal. The  bony calvarium and bones of the skull base appear intact.       Impression    IMPRESSION: No evidence of acute intracranial hemorrhage, mass, or  herniation.    LUCY BERNARD MD     Medications       levothyroxine  50 mcg Oral QAM AC     metoprolol succinate ER  50 mg Oral Daily     sodium chloride (PF)  3 mL Intracatheter Q8H     venlafaxine  150 mg Oral Daily

## 2019-12-31 NOTE — PROGRESS NOTES
EEG dated 12/30/2019      Description:  This is a digital EEG performed in the standard international 10-20 electrode system.  A normal posterior alpha rhythm is present at 8 Hz bilaterally with quiet wakefulness and eye closure.  The background activity during wakefulness is mostly in the Low alpha range and is symmetric. Intermittent delta activity is noted in bifrontal area.  With drowsiness, there is a dropout of the posterior dominant rhythm, and increased delta frequency activity.  The patient did not fall asleep.      Photic stimulation and hyperventilation were performed, and no abnormalities occurred during the procedures.      No epileptiform activity or seizures were present.  The one-lead EKG was unremarkable.    Impression: Intermittent focal slowing in the bifrontal area which could indicate cerebral dysfunction in this area otherwise No epileptiform activity or seizures were present.

## 2019-12-31 NOTE — PLAN OF CARE
Discharge Planner PT   Patient plan for discharge: did not state  Current status: PT orders received, chart reviewed, discussed status with OT. Pt was admitted with suspected seizure, new cognitive deficits. Pt is independent with all mobility tasks at baseline. Currently demonstrates independence with mobility in the room, hallway and up/down stairs per OT.   Barriers to return to prior living situation: defer to OT  Recommendations for discharge: defer to OT  Rationale for recommendations: No skilled Pt needs identified. Will sign off.       Entered by: Kylee Rice 12/31/2019 11:52 AM

## 2019-12-31 NOTE — PLAN OF CARE
Discharge Planner OT   Patient plan for discharge: Home  Current status: OT ordes rec'd, initial eval complete, treatment initiated. Pt admitted for seizure. Pt lives in an apartment w/elevator access, however, takes stairs for exercise. Pt IND w/all I/ADL's previously. Still drives and works.   Pt IND w/all functional transfers, IND w/3g/h tasks at sink. IND w/LB dressing. Pt administered MoCA to assess safety w/I/ADL's. Pt scored 27/30 indicting normal cognitive function. Pt has no further IP OT needs. Will complete orders   Barriers to return to prior living situation: None  Recommendations for discharge: Home  Rationale for recommendations: Pt is at baseline for I/ADL's. From an ADL standpoint, pt is safe to discharge home when medically stable.        Entered by: Deb Ambrosio 12/31/2019 11:12 AM

## 2019-12-31 NOTE — PROGRESS NOTES
Portable EEG [id number] completed.  Patient was awake, alert, drowsy, oriented, and cooperative, with eyes open/closed during recording.  HV and Photic was also performed.  Ordered by Dr. Harrington.

## 2019-12-31 NOTE — PROGRESS NOTES
EEG dated 12/31/2019      Description:  This is a digital EEG performed in the standard international 10-20 electrode system.  A normal posterior alpha rhythm is present at 8-9 Hz bilaterally with quiet wakefulness and eye closure.  The background activity during wakefulness is mostly in the Low alpha range and is symmetric.     Intermittent delta/theta activity is noted in bifrontal area.  With drowsiness, there is a dropout of the posterior dominant rhythm, and increased delta/theta frequency activity.  The patient did not fall asleep.      Photic stimulation and hyperventilation were performed, and no abnormalities occurred during the procedures.      No epileptiform activity or seizures were present.  The one-lead EKG was unremarkable.    Impression: Intermittent focal slowing in the bifrontal area which could indicate cerebral dysfunction in this area otherwise No epileptiform activity or seizures were present.

## 2019-12-31 NOTE — PROGRESS NOTES
MD Notification    Notified Person: MD    Notified Person Name: Kareem    Notification Date/Time: December 31, 2019 2:18 AM    Notification Interaction: pgd    Purpose of Notification:  pt is requesting a one time dose of Ativan for MRI in the AM    Orders Received: orders entered for one time PRN dose     Comments:

## 2019-12-31 NOTE — PROGRESS NOTES
12/31/19 0943   Quick Adds   Type of Visit Initial Occupational Therapy Evaluation   Living Environment   Lives With alone   Living Arrangements apartment   Home Accessibility no concerns  (elevator access)   Transportation Anticipated car, drives self;family or friend will provide   Living Environment Comment Pt lives alone but has good support system    Functional Level   Ambulation 0-->independent   Transferring 0-->independent   Toileting 0-->independent   Bathing 0-->independent   Dressing 0-->independent   Eating 0-->independent   Communication 0-->understands/communicates without difficulty   Cognition 0 - no cognition issues reported   Fall history within last six months no   Prior Functional Level Comment Pt was previously IND w/all I/ADL   General Information   Onset of Illness/Injury or Date of Surgery - Date 12/30/19   Referring Physician Ramila Harrington MD   Patient/Family Goals Statement Home   Additional Occupational Profile Info/Pertinent History of Current Problem Seizure   Precautions/Limitations seizure precautions   Cognitive Status Examination   Orientation orientation to person, place and time   Level of Consciousness alert   Follows Commands (Cognition) WNL   Memory intact   Attention No deficits were identified   Organization/Problem Solving No deficits were identified   Executive Function No deficits were identified   Visual Perception   Visual Perception Wears glasses   Sensory Examination   Sensory Quick Adds No deficits were identified   Pain Assessment   Patient Currently in Pain No   Posture   Posture not impaired   Range of Motion (ROM)   ROM Quick Adds No deficits were identified   Strength   Manual Muscle Testing Quick Adds No deficits were identified   Hand Strength   Hand Strength Comments Grossly equal in BUE   Coordination   Upper Extremity Coordination No deficits were identified   Transfer Skill: Bed to Chair/Chair to Bed   Level of [place]: Bed to Chair independent  "  Transfer Skill: Sit to Stand   Level of [place]: Sit/Stand independent   Transfer Skill: Toilet Transfer   Level of [place]: Toilet independent   Balance   Balance Quick Add No deficits identified   Upper Body Dressing   Level of [place]: Dress Upper Body independent   Lower Body Dressing   Level of [place]: Dress Lower Body independent   Toileting   Level of [place]: Toilet independent   Grooming   Level of [place]: Grooming independent   Eating/Self Feeding   Level of [place]: Eating independent   Instrumental Activities of Daily Living (IADL)   Previous Responsibilities meal prep;housekeeping;laundry;medication management;finances;driving;work;shopping   Clinical Impression   Criteria for Skilled Therapeutic Interventions Met evaluation only   OT Diagnosis Decreased safety w/I/ADL's   Influenced by the following impairments Cog   Assessment of Occupational Performance 1-3 Performance Deficits   Identified Performance Deficits IADL   Clinical Decision Making (Complexity) Low complexity   Therapy Frequency Daily   Predicted Duration of Therapy Intervention (days/wks) Eval only   Anticipated Discharge Disposition Home   Risks and Benefits of Treatment have been explained. Yes   Patient, Family & other staff in agreement with plan of care Yes   Clinical Impression Comments Pt appears to be at baseline for [place] and safety w/I/ADL'   [facility] [employer] TM \"6 Clicks\"   2016, Trustees of [facility], under license to [employer].  All rights reserved.   6 Clicks Short Forms Daily Activity Inpatient Short Form   [facility] AM-PAC  \"6 Clicks\" Daily Activity Inpatient Short Form   1. Putting on and taking off regular lower body clothing? 4 - None   2. Bathing (including washing, rinsing, drying)? 4 - None   3. Toileting, which includes using toilet, bedpan or urinal? 4 - None   4. Putting on and taking off regular upper body clothing? 4 - None   5. Taking care " of personal grooming such as brushing teeth? 4 - None   6. Eating meals? 4 - None   Daily Activity Raw Score (Score out of 24.Lower scores equate to lower levels of function) 24   Total Evaluation Time   Total Evaluation Time (Minutes) 31

## 2019-12-31 NOTE — PROVIDER NOTIFICATION
"Dr. Garces: \"Patient would REALLY like to discharge tonight after MRI is complete.  Is it possible to get things ready and if neuro says its ok she can go?\"    Received return call from MD. Patient still needs to see neurology and MRI has not been completed.  Plan is for discharge tomorrow.  "

## 2020-01-01 VITALS
HEART RATE: 77 BPM | DIASTOLIC BLOOD PRESSURE: 80 MMHG | OXYGEN SATURATION: 97 % | TEMPERATURE: 98.1 F | RESPIRATION RATE: 14 BRPM | SYSTOLIC BLOOD PRESSURE: 135 MMHG

## 2020-01-01 PROCEDURE — 25000132 ZZH RX MED GY IP 250 OP 250 PS 637: Performed by: HOSPITALIST

## 2020-01-01 PROCEDURE — 99238 HOSP IP/OBS DSCHRG MGMT 30/<: CPT | Performed by: INTERNAL MEDICINE

## 2020-01-01 RX ADMIN — VENLAFAXINE HYDROCHLORIDE 150 MG: 150 CAPSULE, EXTENDED RELEASE ORAL at 08:17

## 2020-01-01 RX ADMIN — LEVOTHYROXINE SODIUM 50 MCG: 50 TABLET ORAL at 06:44

## 2020-01-01 RX ADMIN — METOPROLOL SUCCINATE 50 MG: 50 TABLET, EXTENDED RELEASE ORAL at 08:17

## 2020-01-01 ASSESSMENT — ACTIVITIES OF DAILY LIVING (ADL)
ADLS_ACUITY_SCORE: 12

## 2020-01-01 NOTE — PROVIDER NOTIFICATION
Spoke with Dr Rose Neurology regarding pt update. Reviewed MRI results and nursing notes with MD. Per neurologist pt is OK to discharge. MD will not round on pt.

## 2020-01-01 NOTE — PLAN OF CARE
Neuros intact, no seizures noted, VSS, tele SB, few runs during sleep, up with SBA, tolerated MRI-ativan made patient sleepy, slept most of shift. Plan to go home today. Patient scoring green on aggression stoplight tool.

## 2020-01-01 NOTE — CONSULTS
Megan Bettencourt is a 64 year old female who was admitted on 12/30/2019. I was asked to see the patient for witnessed tonic-clonic seizure.     Patient has sleep deprivation for 2 days prior to the event , with skipping meals and ran out of clonazepam about 1 week ago.     In the ER vital signs were stable, blood alcohol level undetectable,Utox negative, normal Lab (sodium 139, glucose 128, Ca 8.8), unremarkable Head CT.     Denied family history of seizure, prior history of seizure, meningitis/encephalitis or head trauma or recent fever/illness.     Except for mild positive Romberg sign, no focal neurologic deficit was noted.    MRI brain 12/31  Diffuse cerebral volume loss and cerebral white matter  changes consistent with chronic small vessel ischemic disease. No evidence for acute intracranial pathology. No findings to suggest a seizure focus.      EEG 12/30, EEG 12/31   Intermittent focal slowing in the bifrontal area which could indicate cerebral dysfunction in this area otherwise No epileptiform activity or seizures were present.    #Possibly provoked seizure in the presence of sleep deprivation, and benzodiazepine withdrawal, and possiblely antidepressant ( venlafaxine) does not have any sign of Wernicke encephlaopthy    -Recommedn taper off Venlafaxine by PCP- (one of seizure triggering medication)  -Recommend avoid BNZ ( clonazepam) for insomnia  -Not starting antiepileptic at this -No further work up from neurology standpoint. If there is additional seizure event in the future, will consider antiepileptic.  -Epilepsy diagnosis was Not made at this point therefore no legal driving restriction is applied. However, IF she cannot control risk factors ( such as sleep deprivation, alcohol or drug consumption, BNZ withdrawal, stress, systemic illness or other seizure triggering medication  Including some antidepressants) THEN RECOMMEND driving restriction, avoid height or avoid operating machinery for the  possible PROVOKED seizure even if formal diagnosis of epilepsy was Not made.      -EEG-No epileptiform activity or seizures   -MR-No evidence for acute intracranial pathology  -B12, folate, TSH- WNL/  Thiamin level- pending     Above was discussed with hospitalist attending, Dr Garces.\    \total time : 25 minutes  More than 50% of the time was spent on counseling on the Pathophysiology of the seizure and EEG finding with  discussing/coordinating care with hospital staffs.

## 2020-01-01 NOTE — PLAN OF CARE
Patient admitted with sz.  VSS, tele SB, had small run of SVT while sleeping.  A&Ox4, neuro's intact, up with SBA.  EEG & MRI completed.  Await final neuro recommendations, plan for discharge to home tomorrow.

## 2020-01-01 NOTE — DISCHARGE INSTRUCTIONS
Understanding Seizures    What is a seizure?    A sudden is a sudden, brief change in electrical activity in your brain. Seizures can be caused by injury to the brain, scar formation, tumors, stroke or infection. Often the cause is not known. With treatment, most people lead normal lives.      What is the treatment for seizures?    The main treatment for seizures are anti-epileptic drugs (AEDs). These drugs greatly reduce or prevent seizures in most people. Tips for taking your medicines:    Never stop taking your seizure medicine without talking to your doctor. Do not stop your drugs because seizures have stopped.    Do not skip a dose. This can change how well the drug works.    If you miss a dose, take it as soon as you remember. If it is within a few hours of your next dose, skip the dose you forgot, and take your next dose. After that, go back to the normal schedule.    If you miss more than one dose, talk to your doctor or nurse.    NEVER take double doses.    Get refills ahead of time before you run out.    Store your medicines in a dry place. Keep medicines in the bottle that they came in. Throw away all used bottles.    Keep a list of the medicines you take.    Don't take herbal supplements or antacids without talking with your doctor first. And ask your pharmacist about taking drugstore medicines.    If you need help remembering to take your medicines, use a pillbox.    Ask family, friends or coworkers to remind you when it is time to take a dose.    Your blood levels will need to be tested to be sure you are getting the right dose. Your doctor will tell you when you should be tested.    If you have any side effects or another seizure, your doctor may need to adjust the dose or change the medicines.      What are the side effects of AED medicines?    You may have: dizziness, trouble thinking, tiredness, stomach upset, weight gain or loss, depression or allergic reaction (such as rash or fever).    If this  is a new drug or your dose has changed, the side effects should go away. Always talk to your doctor about any side effects. Your doctor can suggest ways to manage them.      What can I do to take care of myself?    Your lifestyle  Keep to a schedule as much as you can. Get plenty of sleep. Try to manage your stress.  Most people with seizures can lead an active life. Plan ahead for activities that carry some risk. You should:    Always swim with a  or ford present.    Wear a helmet for biking, skiing and similar sports.    Ask your doctor about contact sports.    Avoid activities where having a seizure would put you or someone else in danger.    Safe activities include jogging, aerobics, cross-country skiing, dancing, hiking, bowling and tennis.    It is a good idea to wear a medical alert bracelet. Tell family members, friends and co-workers about your seizure disorder.    Your home  Be sure your home is set up to be safe.    Keep your bathroom and bedroom doors unlocked. Do not block these doors.    Take showers only. In a bath, you risk drowning during a seizure.    Replace all glass doors with safety glass or plastic.    Fill your plate or bowl near the stove instead of carrying all the food to the table.    When cooking, turn the pot and pan handles towards the back of the stove.    Driving your car is generally safe and legal once seizures are under control. [place] state law says that a person must not drive for 3 months if a seizure is resulted in loss of consciousness.    Brain alerting chemicals  Many chemicals can cause seizures. Even small amounts of alcohol can trigger seizures. Illegal drugs can also cause severe seizures, even in people who do not normally have them.      How should family and friends respond to a seizure?    There is no way to stop a seizure. Family and caregivers can try to make sure you are safe.    When a seizure starts, family or caregivers should:    Keep you from  falling.    Loosen tight clothing, especially around the neck.    Cushion the head.    Clear the area of furniture and sharp objects.    Turn you on your side. If you vomit, this helps prevent inhaling vomit.    Stay with you until you recover or medical help arrives.    Take your pulse and watch your rate of breathing, if possible.    Seizures typically last less than 3 minutes. Afterward, the person may be tired, confused and achy. HE or she may need to sleep for several hours to recover.    Call 911 if:    A seizure lasts more than 5 minutes.    This is the first seizure ever.    Another seizure starts before awareness returns after a prior seizure.    The person had a seizure in water.    The person is pregnant, injured or has diabetes.    The person does not have a medical ID bracelet instructing what to do.    The person does not wake up or behave normally.    The seizure is different than the usual seizure.      When someone has a seizure DO NOT    Place anything between the person's teeth (inculding your fingers).    Try to hold the person down    Give the person something to eat or drink until he or she is fully awake and alert.    Move the person unless they are in danger or near a hazard.    Start CPR unless the seizure has stopped and the person is not breathing or has no pulse.    Try to stop the person from convulsing. They have no control over the seizure.      When should I call my doctor?  Call if you have:    Seizures more often. Especially if they have been under control for a long time.    Weakness, problems with seeing or new problems with balance.    Unusual behavior.    Side effects from medicines that are not going away or are getting worse.      Coping at Home    Seizures affect those around you, too. Talk with your loved ones and learn their concerns.    People with seizures can hold many kinds of jobs. But here are some issues to keep in mind.    Your work needs to be safe. How well  controlled are your seizures? Does the job involve tasks that may not be safe for you, such as driving or using heavy machinery?    You may want to tell your boss or co-workers about your seizures. This is your choice. But it may be safer if people at your workplace are prepared to respond to a seizure. If you are concerned about losing your job, know your rights. The Americans with Disabilities Act provides work related protections for people with epilepsy.    Know the signs of depression. Depression can affect your thoughts and feelings. It can be caused by trouble coping with seizures. Call your doctor if you are having problems with this.    Resources    Epilepsy foundation: www.epilepsyfoundationmn.org  Epilepsy.com: www.epilepsy.com  National [place] of Neurological Disorders and Stroke: www.ninds.nih.gov  KidsHealth.org: www.kidshealth.org  [employer]: www.eeoc.giv/facts/epilepsy.html   Phone: 541.508.8944    Please follow up with neurology in 4 weeks. You may call any of the following neurology clinics for an appointment or a clinic of your choice. Tell them you were recently hospitalized and need follow up as recommended at discharge.    1. [facility]   [address], [address]   [place], [place] [address] [phone]  2. [facility]   [address]   [place], [place] [address] [phone]  3. [facility]   [address] [place], [place] [id number] [phone]  4. [facility] Palencia MD   [address]   [place], [place] [id number] [phone]

## 2020-01-01 NOTE — PLAN OF CARE
Patient admitted for seizure.  Neuro exam intact, no seizure activity noted.  VSS, tele NSR.  Episode of ST during the night while sleeping-MD aware.  Up with SBA.  Discharge instructions reviewed with patient.  Patient states that she does not feel comfortable stopping Effexor without a taper and plans to continue at this time but will discuss possible side effects with PCP.  Discharge instructions reviewed, plan to follow up with PCP in one week, [facility] in 4 weeks.  Patient understands she cannot drive at this time.  Discharge to home.

## 2020-01-01 NOTE — PROGRESS NOTES
Spoke with Dr. Garces about Effexor at discharge.  MD states that patient should stop Effexor at this time and make appointment to see her PCP, no taper needed.

## 2020-01-02 LAB
ANA SER QL IF: NEGATIVE
VIT B1 BLD-MCNC: 64 NMOL/L (ref 70–180)

## 2020-01-06 LAB — RHEUMATOID FACT SER NEPH-ACNC: NORMAL IU/ML (ref 0–20)

## 2020-06-09 ENCOUNTER — HOSPITAL ENCOUNTER (EMERGENCY)
Facility: CLINIC | Age: 65
Discharge: HOME | End: 2020-06-09
Attending: NURSE PRACTITIONER | Admitting: NURSE PRACTITIONER
Payer: COMMERCIAL

## 2020-06-09 ENCOUNTER — APPOINTMENT (OUTPATIENT)
Dept: GENERAL RADIOLOGY | Facility: CLINIC | Age: 65
End: 2020-06-09
Attending: EMERGENCY MEDICINE
Payer: COMMERCIAL

## 2020-06-09 VITALS
WEIGHT: 132 LBS | BODY MASS INDEX: 19.55 KG/M2 | HEART RATE: 72 BPM | HEIGHT: 69 IN | OXYGEN SATURATION: 99 % | TEMPERATURE: 98.7 F | RESPIRATION RATE: 16 BRPM | DIASTOLIC BLOOD PRESSURE: 81 MMHG | SYSTOLIC BLOOD PRESSURE: 133 MMHG

## 2020-06-09 DIAGNOSIS — S42.409A ELBOW FRACTURE: ICD-10-CM
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
## 2020-06-09 PROCEDURE — 73080 X-RAY EXAM OF ELBOW: CPT | Mod: RT

## 2020-06-09 PROCEDURE — 99283 EMERGENCY DEPT VISIT LOW MDM: CPT

## 2020-06-09 ASSESSMENT — ENCOUNTER SYMPTOMS
NECK PAIN: 0
NAUSEA: 0
BACK PAIN: 0
WEAKNESS: 0
VOMITING: 0
NUMBNESS: 0
HEADACHES: 0
WOUND: 0

## 2020-06-09 ASSESSMENT — MIFFLIN-ST. JEOR: SCORE: 1195.19

## 2020-06-09 NOTE — DISCHARGE INSTRUCTIONS
600 mg of ibuprofen every 6 hours as needed for discomfort.  You may also use Tylenol 650 to 1000 mg also every 6 hours.  Wear sling well awake and if needed for comfort at night.  Follow-up with orthopedics as we discussed.  Call tomorrow to schedule follow-up.  Apply ice to area for 20 minutes at a time 3-5 times a day for the next 3 to 5 days.

## 2020-06-09 NOTE — ED AVS SNAPSHOT
Emergency Department  [address] [place] [address]  Phone:  [phone]  Fax:  [phone]                                    Megan Bettencourt   MRN: [id number]    Department:   Emergency Department   Date of Visit:  6/9/2020           After Visit Summary Signature Page    I have received my discharge instructions, and my questions have been answered. I have discussed any challenges I see with this plan with the nurse or doctor.    ..........................................................................................................................................  Patient/Patient Representative Signature      ..........................................................................................................................................  Patient Representative Print Name and Relationship to Patient    ..................................................               ................................................  Date                                   Time    ..........................................................................................................................................  Reviewed by Signature/Title    ...................................................              ..............................................  Date                                               Time          22EPIC Rev 08/18

## 2020-06-09 NOTE — ED NOTES
"Patient friend came up to desk to speak with staff. Friend reports patient has a history of alcohol abuse as well as using zoloft and adderal. Friend is concerned patient is having \"blackouts\" and patient is consuming alcohol while she is taking her meds. Family friend concerned patient may have hit her head when she fell yesterday.  "

## 2021-05-13 ENCOUNTER — APPOINTMENT (OUTPATIENT)
Dept: CT IMAGING | Facility: CLINIC | Age: 66
End: 2021-05-13
Attending: EMERGENCY MEDICINE
Payer: COMMERCIAL

## 2021-05-13 ENCOUNTER — HOSPITAL ENCOUNTER (EMERGENCY)
Facility: CLINIC | Age: 66
Discharge: NURSING, REHAB OR LONG-TERM CARE | End: 2021-05-13
Attending: EMERGENCY MEDICINE | Admitting: EMERGENCY MEDICINE
Payer: COMMERCIAL

## 2021-05-13 ENCOUNTER — APPOINTMENT (OUTPATIENT)
Dept: GENERAL RADIOLOGY | Facility: CLINIC | Age: 66
End: 2021-05-13
Attending: EMERGENCY MEDICINE
Payer: COMMERCIAL

## 2021-05-13 VITALS
OXYGEN SATURATION: 99 % | BODY MASS INDEX: 19.78 KG/M2 | SYSTOLIC BLOOD PRESSURE: 147 MMHG | RESPIRATION RATE: 16 BRPM | TEMPERATURE: 98.6 F | HEART RATE: 63 BPM | DIASTOLIC BLOOD PRESSURE: 87 MMHG | WEIGHT: 132 LBS

## 2021-05-13 DIAGNOSIS — S32.82XA MULTIPLE CLOSED FRACTURES OF PELVIS WITHOUT DISRUPTION OF PELVIC RING, INITIAL ENCOUNTER (H): ICD-10-CM

## 2021-05-13 PROCEDURE — 73502 X-RAY EXAM HIP UNI 2-3 VIEWS: CPT

## 2021-05-13 PROCEDURE — 250N000011 HC RX IP 250 OP 636: Performed by: EMERGENCY MEDICINE

## 2021-05-13 PROCEDURE — 73110 X-RAY EXAM OF WRIST: CPT | Mod: RT

## 2021-05-13 PROCEDURE — 72192 CT PELVIS W/O DYE: CPT

## 2021-05-13 PROCEDURE — 250N000013 HC RX MED GY IP 250 OP 250 PS 637: Performed by: EMERGENCY MEDICINE

## 2021-05-13 PROCEDURE — 27197 CLSD TX PELVIC RING FX: CPT

## 2021-05-13 PROCEDURE — 96374 THER/PROPH/DIAG INJ IV PUSH: CPT | Mod: 59

## 2021-05-13 PROCEDURE — 99285 EMERGENCY DEPT VISIT HI MDM: CPT | Mod: 25

## 2021-05-13 RX ORDER — DEXTROAMPHETAMINE SACCHARATE, AMPHETAMINE ASPARTATE, DEXTROAMPHETAMINE SULFATE AND AMPHETAMINE SULFATE 2.5; 2.5; 2.5; 2.5 MG/1; MG/1; MG/1; MG/1
10 TABLET ORAL 2 TIMES DAILY
Qty: 28 TABLET | Refills: 0 | Status: SHIPPED | OUTPATIENT
Start: 2021-05-13 | End: 2021-05-14

## 2021-05-13 RX ORDER — DEXTROAMPHETAMINE SACCHARATE, AMPHETAMINE ASPARTATE, DEXTROAMPHETAMINE SULFATE AND AMPHETAMINE SULFATE 2.5; 2.5; 2.5; 2.5 MG/1; MG/1; MG/1; MG/1
10 TABLET ORAL 2 TIMES DAILY
COMMUNITY
End: 2022-02-27

## 2021-05-13 RX ORDER — FENTANYL CITRATE 50 UG/ML
50 INJECTION, SOLUTION INTRAMUSCULAR; INTRAVENOUS ONCE
Status: COMPLETED | OUTPATIENT
Start: 2021-05-13 | End: 2021-05-13

## 2021-05-13 RX ORDER — METOPROLOL SUCCINATE 25 MG/1
25 TABLET, EXTENDED RELEASE ORAL DAILY
COMMUNITY
End: 2022-02-27

## 2021-05-13 RX ORDER — OXYCODONE AND ACETAMINOPHEN 5; 325 MG/1; MG/1
1-2 TABLET ORAL EVERY 4 HOURS PRN
Qty: 12 TABLET | Refills: 0 | Status: SHIPPED | OUTPATIENT
Start: 2021-05-13 | End: 2021-05-14

## 2021-05-13 RX ORDER — ESCITALOPRAM OXALATE 10 MG/1
10 TABLET ORAL DAILY
COMMUNITY
End: 2022-02-27

## 2021-05-13 RX ORDER — OXYCODONE AND ACETAMINOPHEN 5; 325 MG/1; MG/1
1 TABLET ORAL ONCE
Status: COMPLETED | OUTPATIENT
Start: 2021-05-13 | End: 2021-05-13

## 2021-05-13 RX ADMIN — OXYCODONE HYDROCHLORIDE AND ACETAMINOPHEN 1 TABLET: 5; 325 TABLET ORAL at 13:26

## 2021-05-13 RX ADMIN — FENTANYL CITRATE 50 MCG: 50 INJECTION, SOLUTION INTRAMUSCULAR; INTRAVENOUS at 07:55

## 2021-05-13 ASSESSMENT — ENCOUNTER SYMPTOMS
BACK PAIN: 1
HEADACHES: 0
ARTHRALGIAS: 1
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
## 2021-05-13 NOTE — PHARMACY-ADMISSION MEDICATION HISTORY
Pharmacy Medication History  Admission medication history interview status for the 5/13/2021  admission is complete. See EPIC admission navigator for prior to admission medications     Location of Interview: Patient room  Medication history sources: Patient and Surescripts    Significant changes made to the medication list:  Removed: Rm  Added: Lexapro, Adderall  Adj: Toprol    In the past week, patient estimated taking medication this percent of the time: greater than 90%    Additional medication history information:   none    Medication reconciliation completed by provider prior to medication history? No    Time spent in this activity: 15 min    Prior to Admission medications    Medication Sig Last Dose Taking? Auth Provider   amphetamine-dextroamphetamine (ADDERALL) 10 MG tablet Take 10 mg by mouth 2 times daily 5/12/2021 at Unknown time Yes Unknown, Entered By History   escitalopram (LEXAPRO) 10 MG tablet Take 10 mg by mouth daily 5/12/2021 at Unknown time Yes Unknown, Entered By History   levothyroxine (SYNTHROID) 50 MCG tablet Take 50 mcg by mouth daily  5/12/2021 at Unknown time Yes Reported, Patient   metoprolol succinate ER (TOPROL-XL) 25 MG 24 hr tablet Take 25 mg by mouth daily 5/12/2021 at Unknown time Yes Unknown, Entered By History       The information provided in this note is only as accurate as the sources available at the time of update(s)

## 2021-05-13 NOTE — DISCHARGE INSTRUCTIONS
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
## 2021-05-13 NOTE — ED NOTES
Bed: ED02  Expected date:   Expected time:   Means of arrival:   Comments:  433  65 F fall/hip/arm pain  5811
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
## 2021-05-14 ENCOUNTER — HOSPITAL LABORATORY (OUTPATIENT)
Dept: OTHER | Facility: CLINIC | Age: 66
End: 2021-05-14

## 2021-05-14 ENCOUNTER — NURSING HOME VISIT (OUTPATIENT)
Dept: GERIATRICS | Facility: CLINIC | Age: 66
End: 2021-05-14
Payer: COMMERCIAL

## 2021-05-14 VITALS
TEMPERATURE: 98 F | HEART RATE: 60 BPM | SYSTOLIC BLOOD PRESSURE: 103 MMHG | HEIGHT: 67 IN | OXYGEN SATURATION: 95 % | DIASTOLIC BLOOD PRESSURE: 58 MMHG | RESPIRATION RATE: 18 BRPM | WEIGHT: 132 LBS | BODY MASS INDEX: 20.72 KG/M2

## 2021-05-14 DIAGNOSIS — E03.9 HYPOTHYROIDISM, UNSPECIFIED TYPE: ICD-10-CM

## 2021-05-14 DIAGNOSIS — F41.1 GENERALIZED ANXIETY DISORDER: ICD-10-CM

## 2021-05-14 DIAGNOSIS — F98.8 ATTENTION DEFICIT DISORDER (ADD) WITHOUT HYPERACTIVITY: ICD-10-CM

## 2021-05-14 DIAGNOSIS — S32.82XD MULTIPLE CLOSED FRACTURES OF PELVIS WITHOUT DISRUPTION OF PELVIC RING WITH ROUTINE HEALING, SUBSEQUENT ENCOUNTER: Primary | ICD-10-CM

## 2021-05-14 DIAGNOSIS — I10 BENIGN ESSENTIAL HYPERTENSION: ICD-10-CM

## 2021-05-14 DIAGNOSIS — R53.81 PHYSICAL DECONDITIONING: ICD-10-CM

## 2021-05-14 PROCEDURE — 99310 SBSQ NF CARE HIGH MDM 45: CPT | Performed by: NURSE PRACTITIONER

## 2021-05-14 RX ORDER — ACETAMINOPHEN 325 MG/1
650 TABLET ORAL 3 TIMES DAILY PRN
COMMUNITY

## 2021-05-14 RX ORDER — OXYCODONE AND ACETAMINOPHEN 5; 325 MG/1; MG/1
1 TABLET ORAL EVERY 4 HOURS PRN
COMMUNITY
End: 2021-05-14

## 2021-05-14 RX ORDER — OXYCODONE AND ACETAMINOPHEN 5; 325 MG/1; MG/1
1 TABLET ORAL EVERY 4 HOURS PRN
Qty: 60 TABLET | Refills: 0 | Status: SHIPPED | OUTPATIENT
Start: 2021-05-14 | End: 2022-02-27

## 2021-05-14 ASSESSMENT — MIFFLIN-ST. JEOR: SCORE: 1171.38
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
## 2021-05-16 ENCOUNTER — HOSPITAL LABORATORY (OUTPATIENT)
Dept: OTHER | Facility: CLINIC | Age: 66
End: 2021-05-16

## 2021-05-16 LAB
GAMMA INTERFERON BACKGROUND BLD IA-ACNC: 0.02 IU/ML
M TB IFN-G CD4+ BCKGRND COR BLD-ACNC: 8.53 IU/ML
M TB TUBERC IFN-G BLD QL: NEGATIVE
MITOGEN IGNF BCKGRD COR BLD-ACNC: 0.01 IU/ML
MITOGEN IGNF BCKGRD COR BLD-ACNC: 0.07 IU/ML

## 2021-05-17 LAB
ANION GAP SERPL CALCULATED.3IONS-SCNC: 3 MMOL/L (ref 3–14)
BUN SERPL-MCNC: 10 MG/DL (ref 7–30)
CALCIUM SERPL-MCNC: 9 MG/DL (ref 8.5–10.1)
CHLORIDE SERPL-SCNC: 104 MMOL/L (ref 94–109)
CO2 SERPL-SCNC: 34 MMOL/L (ref 20–32)
CREAT SERPL-MCNC: 0.71 MG/DL (ref 0.52–1.04)
GFR SERPL CREATININE-BSD FRML MDRD: 88 ML/MIN/{1.73_M2}
GLUCOSE SERPL-MCNC: 59 MG/DL (ref 70–99)
HGB BLD-MCNC: 14 G/DL (ref 11.7–15.7)
POTASSIUM SERPL-SCNC: 3.8 MMOL/L (ref 3.4–5.3)
SODIUM SERPL-SCNC: 141 MMOL/L (ref 133–144)

## 2021-05-18 ENCOUNTER — DISCHARGE SUMMARY NURSING HOME (OUTPATIENT)
Dept: GERIATRICS | Facility: CLINIC | Age: 66
End: 2021-05-18
Payer: COMMERCIAL

## 2021-05-18 VITALS
WEIGHT: 131.2 LBS | BODY MASS INDEX: 19.43 KG/M2 | HEIGHT: 69 IN | SYSTOLIC BLOOD PRESSURE: 119 MMHG | TEMPERATURE: 97.9 F | DIASTOLIC BLOOD PRESSURE: 71 MMHG | HEART RATE: 56 BPM | OXYGEN SATURATION: 97 % | RESPIRATION RATE: 18 BRPM

## 2021-05-18 DIAGNOSIS — S32.82XD MULTIPLE CLOSED FRACTURES OF PELVIS WITHOUT DISRUPTION OF PELVIC RING WITH ROUTINE HEALING, SUBSEQUENT ENCOUNTER: Primary | ICD-10-CM

## 2021-05-18 DIAGNOSIS — E03.9 HYPOTHYROIDISM, UNSPECIFIED TYPE: ICD-10-CM

## 2021-05-18 DIAGNOSIS — R53.81 PHYSICAL DECONDITIONING: ICD-10-CM

## 2021-05-18 DIAGNOSIS — F98.8 ATTENTION DEFICIT DISORDER (ADD) WITHOUT HYPERACTIVITY: ICD-10-CM

## 2021-05-18 DIAGNOSIS — F41.1 GENERALIZED ANXIETY DISORDER: ICD-10-CM

## 2021-05-18 DIAGNOSIS — I10 BENIGN ESSENTIAL HYPERTENSION: ICD-10-CM

## 2021-05-18 PROCEDURE — 99316 NF DSCHRG MGMT 30 MIN+: CPT | Performed by: NURSE PRACTITIONER

## 2021-05-18 ASSESSMENT — MIFFLIN-ST. JEOR: SCORE: 1191.56

## 2021-05-18 NOTE — PROGRESS NOTES
[facility] Discharge Orders    Name: Megan Bettencourt  : 1955  Planned Discharge Date: 21  Discharged to: previous independent home      MEDICAL FOLLOW UP  Follow up with PCP in 1-2 weeks   Follow up with Specialists: Ortho      FUTURE LABS: No labs orders/due      ORDER CHANGES:  - Added scheduled tylenol    DISCHARGE MEDICATIONS:  The patient s pharmacy is authorized to dispense a 30-day supply of medications. Refill requests should be directed to the primary provider, Gunnar Sarkar.   At discharge, the facility may send percocet #5 tablets upon discharge. Future refills to be provided by PCP.  Current Outpatient Medications   Medication Sig Dispense Refill     acetaminophen (TYLENOL) 325 MG tablet Take 650 mg by mouth 3 times daily       amphetamine-dextroamphetamine (ADDERALL) 10 MG tablet Take 10 mg by mouth 2 times daily       escitalopram (LEXAPRO) 10 MG tablet Take 10 mg by mouth daily       levothyroxine (SYNTHROID) 50 MCG tablet Take 50 mcg by mouth daily        metoprolol succinate ER (TOPROL-XL) 25 MG 24 hr tablet Take 25 mg by mouth daily       oxyCODONE-acetaminophen (PERCOCET) 5-325 MG tablet Take 1 tablet by mouth every 4 hours as needed for severe pain 60 tablet 0       SERVICES:  None needed    ADDITIONAL INSTRUCTIONS:  Notify PCP if you have a fever greater than 100.5 degrees.    DO NOT DRIVE while taking narcotic pain medications.         ELVIRA Chowdary CNP  This document was electronically signed on May 18, 2021
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
## 2021-05-18 NOTE — LETTER
2021        RE: Megan Bettencourt  [address] [place] [address]        [facility] DISCHARGE SUMMARY  PATIENT'S NAME: Megan Bettencourt  YOB: 1955  MEDICAL RECORD NUMBER:  [id number]  Place of Service where encounter took place:  No question data found.    PRIMARY CARE PROVIDER AND CLINIC RESPONSIBLE AFTER TRANSFER:   Gunnar Sarkar MD, [facility] [address] / [place] [address] ***   {[facility] GERIATRIC DISCHARGE DISPOSITION:550533}     Transferring providers: ELVIRA Chowdary CNP, ***, MD  Recent Hospitalization/ED:  {LOCATION OF NURSING HOME ADMISSIONS:862666} stay *** to ***.  Date of SNF Admission: {fgsdcdate:1561}  Date of SNF (anticipated) Discharge: {fgsdcdate:889825}  Discharged to: {fgsdischargeto1:120025}  Cognitive Scores: {fgscog1:929512}  Physical Function: {fgsphysicalfunction:633298}  DME: {fgsdmedc:037959}    CODE STATUS/ADVANCE DIRECTIVES DISCUSSION:  {fgscodestatus:278248} {Provider, verify code status is accurate as an order in Epic}  ALLERGIES: Hydrocodone, Hydrocodone-acetaminophen, and Morphine hcl    DISCHARGE DIAGNOSIS/NURSING FACILITY COURSE:   {fgsdia}    Past Medical History:  has a past medical history of Anxiety, Cervical stenosis of spine, Thyroid disease, and Vision disturbance.    Discharge Medications:  {Providers Please double check the med list (in the plan section >> meds & orders tab) and Discontinue any of the meds flagged by the TC to be discontinued}  Current Outpatient Medications   Medication Sig Dispense Refill     acetaminophen (TYLENOL) 325 MG tablet Take 650 mg by mouth 3 times daily       amphetamine-dextroamphetamine (ADDERALL) 10 MG tablet Take 10 mg by mouth 2 times daily       escitalopram (LEXAPRO) 10 MG tablet Take 10 mg by mouth daily       levothyroxine (SYNTHROID) 50 MCG tablet Take 50 mcg by mouth daily        metoprolol succinate ER (TOPROL-XL) 25 MG 24 hr tablet Take 25 mg by  mouth daily       oxyCODONE-acetaminophen (PERCOCET) 5-325 MG tablet Take 1 tablet by mouth every 4 hours as needed for severe pain 60 tablet 0     ***  Medication Changes/Rationale:     ***    Controlled medications sent with patient:   {fgscontrolledmeds1:454702}     ROS:   {ROS FGS:021950:::0}    Physical Exam:   Vitals: There were no vitals taken for this visit.  BMI= There is no height or weight on file to calculate BMI.  {NURSING HOME PHYSICAL EXAM:824819}     SNF labs: {fgslabdischarge:609924}  {fgsinrtable:901499:x}    DISCHARGE PLAN:    Follow up labs: {fgsfuturelabs1:084503}    Medical Follow Up:      {fgsdischargefollowup:616426}    MTM referral needed and placed by this provider: {YES (EXPLAIN)/NO:137625}    Current [facility] scheduled appointments:   ***    Discharge Services: {fgsdcservices1:479346}    Discharge Instructions Verbalized to Patient at Discharge:     {fgsdischargeinstructions1:869191}      TOTAL DISCHARGE TIME:   {TIME:190489}  Electronically signed by:  ELVIRA Chowdary CNP ***    {shomecare F2F:663680}  {Providers Please double check the med list (in the plan section >> meds & orders tab) and Discontinue any of the meds flagged by the TC to be discontinued}                [facility] Discharge Orders    Name: Megan Bettencourt  : 1955  Planned Discharge Date: 21  Discharged to: previous independent home      MEDICAL FOLLOW UP  Follow up with PCP in 1-2 weeks ***  Follow up with Specialists ***      FUTURE LABS: {FGSFUTURELABS:539010}    ORDER CHANGES:  {fgsorderchanges:356300}    DISCHARGE MEDICATIONS:  The patient s pharmacy is authorized to dispense a 30-day supply of medications. Refill requests should be directed to the primary provider, Gunnar Sarkar***.   {fgsdcnarcotic:435701}  Current Outpatient Medications   Medication Sig Dispense Refill     acetaminophen (TYLENOL) 325 MG tablet Take 650 mg by mouth 3 times daily        amphetamine-dextroamphetamine (ADDERALL) 10 MG tablet Take 10 mg by mouth 2 times daily       escitalopram (LEXAPRO) 10 MG tablet Take 10 mg by mouth daily       levothyroxine (SYNTHROID) 50 MCG tablet Take 50 mcg by mouth daily        metoprolol succinate ER (TOPROL-XL) 25 MG 24 hr tablet Take 25 mg by mouth daily       oxyCODONE-acetaminophen (PERCOCET) 5-325 MG tablet Take 1 tablet by mouth every 4 hours as needed for severe pain 60 tablet 0       SERVICES:  {fgsdcservices:555261}    ADDITIONAL INSTRUCTIONS:  {fgsdischarge instructions:340009}        ELVIRA Chowdary CNP  This document was electronically signed on May 18, 2021        [place] [facility] DISCHARGE SUMMARY  PATIENT'S NAME: Megan Bettencourt  YOB: 1955  MEDICAL RECORD NUMBER:  [id number]  Place of Service where encounter took place:  No question data found.    PRIMARY CARE PROVIDER AND CLINIC RESPONSIBLE AFTER TRANSFER:   Gunnar Sarkar MD, [facility] [address] / [place] [address]    Non-G Provider     Transferring providers: ELVIRA Chowdary CNP; Carlos Mcmanus MD  Recent Hospitalization/ED:  [facility] Emergency Department on 21.  Date of SNF Admission: May 13, 2021  Date of SNF (anticipated) Discharge: May 18, 2021  Discharged to: previous independent home  Cognitive Scores: {fgscog1:217343}  Physical Function: {fgsphysicalfunction:148203}  DME: {fgsdmedc:575699}    CODE STATUS/ADVANCE DIRECTIVES DISCUSSION:  Full Code {Provider, verify code status is accurate as an order in Epic}  ALLERGIES: Hydrocodone, Hydrocodone-acetaminophen, and Morphine hcl    DISCHARGE DIAGNOSIS/NURSING FACILITY COURSE:   {fgsdia}    Past Medical History:  has a past medical history of Anxiety, Cervical stenosis of spine, Thyroid disease, and Vision disturbance.    Discharge Medications:  {Providers Please double check the med list (in the plan section >> meds &  orders tab) and Discontinue any of the meds flagged by the TC to be discontinued}  Current Outpatient Medications   Medication Sig Dispense Refill     acetaminophen (TYLENOL) 325 MG tablet Take 650 mg by mouth 3 times daily       amphetamine-dextroamphetamine (ADDERALL) 10 MG tablet Take 10 mg by mouth 2 times daily       escitalopram (LEXAPRO) 10 MG tablet Take 10 mg by mouth daily       levothyroxine (SYNTHROID) 50 MCG tablet Take 50 mcg by mouth daily        metoprolol succinate ER (TOPROL-XL) 25 MG 24 hr tablet Take 25 mg by mouth daily       oxyCODONE-acetaminophen (PERCOCET) 5-325 MG tablet Take 1 tablet by mouth every 4 hours as needed for severe pain 60 tablet 0     ***  Medication Changes/Rationale:     ***    Controlled medications sent with patient:   {fgscontrolledmeds1:647330}     ROS:   {ROS FGS:098011:::0}    Physical Exam:   Vitals: There were no vitals taken for this visit.  BMI= There is no height or weight on file to calculate BMI.  {NURSING HOME PHYSICAL EXAM:750193}     SNF labs: {fgslabdischarge:314879}  {fgsinrtable:182235:x}    DISCHARGE PLAN:    Follow up labs: {fgsfuturelabs1:779179}    Medical Follow Up:      {fgsdischargefollowup:690328}    MTM referral needed and placed by this provider: {YES (EXPLAIN)/NO:811338}    Current [facility] scheduled appointments:   ***    Discharge Services: Patient declined    Discharge Instructions Verbalized to Patient at Discharge:     {fgsdischargeinstructions1:968949}      TOTAL DISCHARGE TIME:   {TIME:229704}  Electronically signed by:  ELVIRA Chowdary CNP ***    {shomecare F2F:682364}  {Providers Please double check the med list (in the plan section >> meds & orders tab) and Discontinue any of the meds flagged by the TC to be discontinued}                    Sincerely,        ELVIRA Chowdary CNP

## 2021-05-19 ENCOUNTER — DOCUMENTATION ONLY (OUTPATIENT)
Dept: OTHER | Facility: CLINIC | Age: 66
End: 2021-05-19

## 2021-06-27 ENCOUNTER — HOSPITAL ENCOUNTER (EMERGENCY)
Facility: CLINIC | Age: 66
Discharge: HOME | End: 2021-06-27
Attending: EMERGENCY MEDICINE | Admitting: EMERGENCY MEDICINE
Payer: COMMERCIAL

## 2021-06-27 VITALS
TEMPERATURE: 99 F | BODY MASS INDEX: 18.94 KG/M2 | HEART RATE: 89 BPM | RESPIRATION RATE: 16 BRPM | HEIGHT: 68 IN | DIASTOLIC BLOOD PRESSURE: 101 MMHG | WEIGHT: 125 LBS | SYSTOLIC BLOOD PRESSURE: 139 MMHG | OXYGEN SATURATION: 97 %

## 2021-06-27 DIAGNOSIS — L03.012 CELLULITIS OF FINGER OF LEFT HAND: ICD-10-CM

## 2021-06-27 DIAGNOSIS — L03.031 PARONYCHIA OF TOE, RIGHT: ICD-10-CM

## 2021-06-27 DIAGNOSIS — B00.1 COLD SORE: ICD-10-CM

## 2021-06-27 DIAGNOSIS — L03.031 CELLULITIS OF TOE OF RIGHT FOOT: ICD-10-CM

## 2021-06-27 LAB
ALBUMIN SERPL-MCNC: 3.7 G/DL (ref 3.4–5)
ALP SERPL-CCNC: 94 U/L (ref 40–150)
ALT SERPL W P-5'-P-CCNC: 21 U/L (ref 0–50)
ANION GAP SERPL CALCULATED.3IONS-SCNC: 4 MMOL/L (ref 3–14)
AST SERPL W P-5'-P-CCNC: 23 U/L (ref 0–45)
BASOPHILS # BLD AUTO: 0 10E9/L (ref 0–0.2)
BASOPHILS NFR BLD AUTO: 0.2 %
BILIRUB SERPL-MCNC: 1.1 MG/DL (ref 0.2–1.3)
BUN SERPL-MCNC: 9 MG/DL (ref 7–30)
CALCIUM SERPL-MCNC: 9 MG/DL (ref 8.5–10.1)
CHLORIDE SERPL-SCNC: 105 MMOL/L (ref 94–109)
CO2 SERPL-SCNC: 29 MMOL/L (ref 20–32)
CREAT SERPL-MCNC: 0.79 MG/DL (ref 0.52–1.04)
DIFFERENTIAL METHOD BLD: ABNORMAL
EOSINOPHIL # BLD AUTO: 0.1 10E9/L (ref 0–0.7)
EOSINOPHIL NFR BLD AUTO: 0.5 %
ERYTHROCYTE [DISTWIDTH] IN BLOOD BY AUTOMATED COUNT: 13.2 % (ref 10–15)
GFR SERPL CREATININE-BSD FRML MDRD: 78 ML/MIN/{1.73_M2}
GLUCOSE SERPL-MCNC: 95 MG/DL (ref 70–99)
HCT VFR BLD AUTO: 43.5 % (ref 35–47)
HGB BLD-MCNC: 14 G/DL (ref 11.7–15.7)
IMM GRANULOCYTES # BLD: 0.1 10E9/L (ref 0–0.4)
IMM GRANULOCYTES NFR BLD: 0.6 %
LACTATE BLD-SCNC: 0.9 MMOL/L (ref 0.7–2)
LYMPHOCYTES # BLD AUTO: 1.2 10E9/L (ref 0.8–5.3)
LYMPHOCYTES NFR BLD AUTO: 6.7 %
MCH RBC QN AUTO: 31.5 PG (ref 26.5–33)
MCHC RBC AUTO-ENTMCNC: 32.2 G/DL (ref 31.5–36.5)
MCV RBC AUTO: 98 FL (ref 78–100)
MONOCYTES # BLD AUTO: 1.3 10E9/L (ref 0–1.3)
MONOCYTES NFR BLD AUTO: 7.6 %
NEUTROPHILS # BLD AUTO: 14.6 10E9/L (ref 1.6–8.3)
NEUTROPHILS NFR BLD AUTO: 84.4 %
NRBC # BLD AUTO: 0 10*3/UL
NRBC BLD AUTO-RTO: 0 /100
PLATELET # BLD AUTO: 486 10E9/L (ref 150–450)
POTASSIUM SERPL-SCNC: 4.1 MMOL/L (ref 3.4–5.3)
PROT SERPL-MCNC: 8.2 G/DL (ref 6.8–8.8)
RBC # BLD AUTO: 4.45 10E12/L (ref 3.8–5.2)
SODIUM SERPL-SCNC: 138 MMOL/L (ref 133–144)
WBC # BLD AUTO: 17.3 10E9/L (ref 4–11)

## 2021-06-27 PROCEDURE — 258N000003 HC RX IP 258 OP 636: Performed by: EMERGENCY MEDICINE

## 2021-06-27 PROCEDURE — 10060 I&D ABSCESS SIMPLE/SINGLE: CPT

## 2021-06-27 PROCEDURE — 96365 THER/PROPH/DIAG IV INF INIT: CPT

## 2021-06-27 PROCEDURE — 250N000013 HC RX MED GY IP 250 OP 250 PS 637: Performed by: EMERGENCY MEDICINE

## 2021-06-27 PROCEDURE — 250N000011 HC RX IP 250 OP 636: Performed by: EMERGENCY MEDICINE

## 2021-06-27 PROCEDURE — 10061 I&D ABSCESS COMP/MULTIPLE: CPT

## 2021-06-27 PROCEDURE — 99284 EMERGENCY DEPT VISIT MOD MDM: CPT | Mod: 25

## 2021-06-27 PROCEDURE — 83605 ASSAY OF LACTIC ACID: CPT | Performed by: EMERGENCY MEDICINE

## 2021-06-27 PROCEDURE — 85025 COMPLETE CBC W/AUTO DIFF WBC: CPT | Performed by: EMERGENCY MEDICINE

## 2021-06-27 PROCEDURE — 80053 COMPREHEN METABOLIC PANEL: CPT | Performed by: EMERGENCY MEDICINE

## 2021-06-27 RX ORDER — OXYCODONE AND ACETAMINOPHEN 5; 325 MG/1; MG/1
1-2 TABLET ORAL EVERY 4 HOURS PRN
Qty: 12 TABLET | Refills: 0 | Status: SHIPPED | OUTPATIENT
Start: 2021-06-27 | End: 2022-02-27

## 2021-06-27 RX ORDER — SODIUM CHLORIDE 9 MG/ML
INJECTION, SOLUTION INTRAVENOUS CONTINUOUS
Status: DISCONTINUED | OUTPATIENT
Start: 2021-06-27 | End: 2021-06-27 | Stop reason: HOSPADM

## 2021-06-27 RX ORDER — CEFAZOLIN SODIUM 1 G/3ML
1 INJECTION, POWDER, FOR SOLUTION INTRAMUSCULAR; INTRAVENOUS ONCE
Status: COMPLETED | OUTPATIENT
Start: 2021-06-27 | End: 2021-06-27

## 2021-06-27 RX ORDER — CEPHALEXIN 500 MG/1
500 CAPSULE ORAL 4 TIMES DAILY
Qty: 40 CAPSULE | Refills: 0 | Status: SHIPPED | OUTPATIENT
Start: 2021-06-27 | End: 2021-07-07

## 2021-06-27 RX ORDER — OXYCODONE AND ACETAMINOPHEN 5; 325 MG/1; MG/1
2 TABLET ORAL ONCE
Status: COMPLETED | OUTPATIENT
Start: 2021-06-27 | End: 2021-06-27

## 2021-06-27 RX ADMIN — CEFAZOLIN 1 G: 1 INJECTION, POWDER, FOR SOLUTION INTRAMUSCULAR; INTRAVENOUS at 18:06

## 2021-06-27 RX ADMIN — SODIUM CHLORIDE 1000 ML: 9 INJECTION, SOLUTION INTRAVENOUS at 17:48

## 2021-06-27 RX ADMIN — OXYCODONE HYDROCHLORIDE AND ACETAMINOPHEN 2 TABLET: 5; 325 TABLET ORAL at 18:51

## 2021-06-27 ASSESSMENT — MIFFLIN-ST. JEOR: SCORE: 1150.5

## 2021-06-27 NOTE — ED PROVIDER NOTES
"  History   Chief Complaint:  Wound Check       HPI   Megan Bettencourt is a 66 year old female who presents with a few different complaints all involving skin wounds. She states that for a few days she has had swelling, drainage, and pain to her third and fourth toes on the right foot. She also believes she burned her proximal left ring finger on her curling iron, which now is red and somewhat painful. She also has another similar erythematous lesion to the distal ring finger, which she is unsure how that may have started. Additionally, she has a sore on her right upper lip. She denies any fevers or any other complaints. She denies feeling weak.    Review of Systems   All other systems reviewed and are negative.        Allergies:  Hydrocodone  Morphine Hcl    Medications:  Tylenol  Adderall  lexapro  Synthroid  toprol  Percocet      Past Medical History:    Anxiety  Cervical stenosis of spine  Pelvic fracture  Thyroid disease   Vision distrubance   Seizure  Pneumothorax  Blunt trauma     Past Surgical History:    Breast augmentation   section  Blepharoplasty lower lids  Cosmetic surgery  Bilateral wrists  Repair ptosis    Family History:    The patient denies past family history.     Social History:  Patient presents with a friend.      Physical Exam     Patient Vitals for the past 24 hrs:   BP Temp Temp src Pulse Resp SpO2 Height Weight   21 1627 105/69 99  F (37.2  C) Temporal 94 16 98 % 1.727 m (5' 8\") 56.7 kg (125 lb)       Physical Exam  Nursing note and vitals reviewed.  Constitutional:  Oriented to person, place, and time. Cooperative.   HENT:   Nose:    Nose normal.   Mouth/Throat:   Mucous membranes are normal.   Eyes:    Conjunctivae normal and EOM are normal.      Pupils are equal, round, and reactive to light.   Neck:    Trachea normal.   Cardiovascular:  Normal rate, regular rhythm, normal heart sounds and normal pulses. No murmur heard.  Pulmonary/Chest:  Effort normal and breath sounds " normal.   Abdominal:   Soft. Normal appearance and bowel sounds are normal.      There is no tenderness.      There is no rebound and no CVA tenderness.   Musculoskeletal:  Extremities atraumatic x 4.   Lymphadenopathy:  No cervical adenopathy.   Neurological:   Alert and oriented to person, place, and time. Normal strength.      No cranial nerve deficit or sensory deficit. GCS eye subscore is 4. GCS verbal subscore is 5. GCS motor subscore is 6.   Skin:    Swelling, erythema, and tenderness to palpation to the right third and fourth toes with purulent drainage coming from both cuticles. Small cold sore to the right upper lip. Two dime sized erythematous lesions to the dorsal aspect of the left ring finger, which are slightly tender but no induration, fluctuance, or masses.   Psychiatric:   Normal mood and affect.      Emergency Department Course   Laboratory:  CBC: WBC 17.3  CMP: Within normal limits  Lactic acid: 0.9    Interventions:  1740 Ancef 1 gram IV  1846 Percocet 2 tablets PO    Procedures    Paronychia Drainage     PERFORMED BY: Dr. Temple  LOCATION: Right 3rd and 4th toes  ANESTHESIA: 0.25% Marcaine locally  PROCEDURAL NOTE: Skin prepped with alcohol.  11 Blade used to incise along the cuticle.  Some purulent and bloody drainage resulted.  Patient tolerated the procedure well without complications.     Emergency Department Course:    Reviewed:  I reviewed the patient's nursing notes, vitals, past medical records, Care Everywhere.     Assessments/Consults:  ED Course as of Jun 27 2354   Sun Jun 27, 2021   1732 I performed initial exam and assessment          Disposition:  The patient was discharged to home.     Impression & Plan   Medical Decision Making:  This is a 66-year-old female who came in for a few different skin complaints.  The lesion on her lip appears to be a cold sore.  The lesions on her toes appear to be paronychia.  The lesions on her left ring finger are possibly small areas of  cellulitis, although one of them likely is a healing burn from her curling iron.  However after being here, she did develop a small papular lesion to her left thumb as well.  It is unclear if that is related.  I felt it was reasonable to check the above blood work and provide her IV fluids and an IV dose of Ancef.  I also I & D'd the paronychias on her right third and fourth toes per the above procedure note.  I feel that she is safe for discharge and outpatient management with close outpatient follow-up.  I will send her home with prescriptions for Keflex as well as Percocet.  She knows to soak the lesions in warm soapy water.  She knows to return here with any concerns or worsening symptoms and again she needs to follow-up as soon as possible in the clinic.    Diagnosis:    ICD-10-CM    1. Paronychia of toes, right  L03.031    2. Cellulitis of finger of left hand  L03.012    3. Cellulitis of toe of right foot  L03.031    4. Cold sore  B00.1        Discharge Medications:  Discharge Medication List as of 6/27/2021  6:52 PM      START taking these medications    Details   cephALEXin (KEFLEX) 500 MG capsule Take 1 capsule (500 mg) by mouth 4 times daily for 10 days, Disp-40 capsule, R-0, Local Print      !! oxyCODONE-acetaminophen (PERCOCET) 5-325 MG tablet Take 1-2 tablets by mouth every 4 hours as needed for pain, Disp-12 tablet, R-0, Local Print       !! - Potential duplicate medications found. Please discuss with provider.          Scribe Disclosure:  I, Jakub Nicole, am serving as a scribe at 5:32 PM on 6/27/2021 to document services personally performed by Samuel Temple MD based on my observations and the provider's statements to me.        Samuel Temple MD  06/27/21 1968

## 2021-06-27 NOTE — ED TRIAGE NOTES
Right foot middle toe and left hand ring finger wounds started 4 days ago. Getting worse. Wounds are red and painful. Toe wound is draining. Pt states there's a small bump on upper lip that just started.

## 2021-10-31 ENCOUNTER — HEALTH MAINTENANCE LETTER (OUTPATIENT)
Age: 66
End: 2021-10-31

## 2022-01-18 ENCOUNTER — LAB REQUISITION (OUTPATIENT)
Dept: LAB | Facility: CLINIC | Age: 67
End: 2022-01-18
Payer: COMMERCIAL

## 2022-01-18 DIAGNOSIS — L40.0 PSORIASIS VULGARIS: ICD-10-CM

## 2022-01-18 PROCEDURE — 87070 CULTURE OTHR SPECIMN AEROBIC: CPT | Performed by: DERMATOLOGY

## 2022-01-20 LAB — BACTERIA SPEC CULT: NORMAL

## 2022-02-27 ENCOUNTER — HOSPITAL ENCOUNTER (EMERGENCY)
Facility: CLINIC | Age: 67
Discharge: HOME | End: 2022-02-27
Attending: EMERGENCY MEDICINE | Admitting: EMERGENCY MEDICINE
Payer: COMMERCIAL

## 2022-02-27 VITALS
WEIGHT: 115.19 LBS | DIASTOLIC BLOOD PRESSURE: 69 MMHG | OXYGEN SATURATION: 100 % | HEART RATE: 100 BPM | SYSTOLIC BLOOD PRESSURE: 127 MMHG | TEMPERATURE: 97.6 F | BODY MASS INDEX: 17.51 KG/M2 | RESPIRATION RATE: 16 BRPM

## 2022-02-27 DIAGNOSIS — F41.9 ANXIETY: ICD-10-CM

## 2022-02-27 DIAGNOSIS — F43.22 ADJUSTMENT DISORDER WITH ANXIOUS MOOD: ICD-10-CM

## 2022-02-27 DIAGNOSIS — R11.0 NAUSEA: ICD-10-CM

## 2022-02-27 LAB
ALBUMIN SERPL-MCNC: 4 G/DL (ref 3.4–5)
ALP SERPL-CCNC: 80 U/L (ref 40–150)
ALT SERPL W P-5'-P-CCNC: 111 U/L (ref 0–50)
ANION GAP SERPL CALCULATED.3IONS-SCNC: 13 MMOL/L (ref 3–14)
AST SERPL W P-5'-P-CCNC: 127 U/L (ref 0–45)
ATRIAL RATE - MUSE: 96 BPM
BASOPHILS # BLD AUTO: 0 10E3/UL (ref 0–0.2)
BASOPHILS NFR BLD AUTO: 1 %
BILIRUB SERPL-MCNC: 2.8 MG/DL (ref 0.2–1.3)
BUN SERPL-MCNC: 18 MG/DL (ref 7–30)
CALCIUM SERPL-MCNC: 9.4 MG/DL (ref 8.5–10.1)
CHLORIDE BLD-SCNC: 93 MMOL/L (ref 94–109)
CO2 SERPL-SCNC: 27 MMOL/L (ref 20–32)
CREAT SERPL-MCNC: 0.66 MG/DL (ref 0.52–1.04)
DIASTOLIC BLOOD PRESSURE - MUSE: NORMAL MMHG
EOSINOPHIL # BLD AUTO: 0 10E3/UL (ref 0–0.7)
EOSINOPHIL NFR BLD AUTO: 0 %
ERYTHROCYTE [DISTWIDTH] IN BLOOD BY AUTOMATED COUNT: 13.3 % (ref 10–15)
GFR SERPL CREATININE-BSD FRML MDRD: >90 ML/MIN/1.73M2
GLUCOSE BLD-MCNC: 159 MG/DL (ref 70–99)
HCT VFR BLD AUTO: 41.9 % (ref 35–47)
HGB BLD-MCNC: 13.8 G/DL (ref 11.7–15.7)
IMM GRANULOCYTES # BLD: 0 10E3/UL
IMM GRANULOCYTES NFR BLD: 1 %
INTERPRETATION ECG - MUSE: NORMAL
LIPASE SERPL-CCNC: 119 U/L (ref 73–393)
LYMPHOCYTES # BLD AUTO: 0.5 10E3/UL (ref 0.8–5.3)
LYMPHOCYTES NFR BLD AUTO: 7 %
MCH RBC QN AUTO: 31.3 PG (ref 26.5–33)
MCHC RBC AUTO-ENTMCNC: 32.9 G/DL (ref 31.5–36.5)
MCV RBC AUTO: 95 FL (ref 78–100)
MONOCYTES # BLD AUTO: 0.5 10E3/UL (ref 0–1.3)
MONOCYTES NFR BLD AUTO: 7 %
NEUTROPHILS # BLD AUTO: 5.6 10E3/UL (ref 1.6–8.3)
NEUTROPHILS NFR BLD AUTO: 84 %
NRBC # BLD AUTO: 0 10E3/UL
NRBC BLD AUTO-RTO: 0 /100
P AXIS - MUSE: 81 DEGREES
PLATELET # BLD AUTO: 225 10E3/UL (ref 150–450)
POTASSIUM BLD-SCNC: 4 MMOL/L (ref 3.4–5.3)
PR INTERVAL - MUSE: 156 MS
PROT SERPL-MCNC: 8.1 G/DL (ref 6.8–8.8)
QRS DURATION - MUSE: 96 MS
QT - MUSE: 360 MS
QTC - MUSE: 454 MS
R AXIS - MUSE: 96 DEGREES
RBC # BLD AUTO: 4.41 10E6/UL (ref 3.8–5.2)
SARS-COV-2 RNA RESP QL NAA+PROBE: NEGATIVE
SODIUM SERPL-SCNC: 133 MMOL/L (ref 133–144)
SYSTOLIC BLOOD PRESSURE - MUSE: NORMAL MMHG
T AXIS - MUSE: 77 DEGREES
VENTRICULAR RATE- MUSE: 96 BPM
WBC # BLD AUTO: 6.6 10E3/UL (ref 4–11)

## 2022-02-27 PROCEDURE — 250N000011 HC RX IP 250 OP 636: Performed by: EMERGENCY MEDICINE

## 2022-02-27 PROCEDURE — 90791 PSYCH DIAGNOSTIC EVALUATION: CPT

## 2022-02-27 PROCEDURE — 96374 THER/PROPH/DIAG INJ IV PUSH: CPT

## 2022-02-27 PROCEDURE — 250N000013 HC RX MED GY IP 250 OP 250 PS 637: Performed by: PSYCHIATRY & NEUROLOGY

## 2022-02-27 PROCEDURE — 85025 COMPLETE CBC W/AUTO DIFF WBC: CPT | Performed by: EMERGENCY MEDICINE

## 2022-02-27 PROCEDURE — C9803 HOPD COVID-19 SPEC COLLECT: HCPCS

## 2022-02-27 PROCEDURE — 80053 COMPREHEN METABOLIC PANEL: CPT | Performed by: EMERGENCY MEDICINE

## 2022-02-27 PROCEDURE — 99285 EMERGENCY DEPT VISIT HI MDM: CPT | Mod: 25

## 2022-02-27 PROCEDURE — 87635 SARS-COV-2 COVID-19 AMP PRB: CPT | Performed by: EMERGENCY MEDICINE

## 2022-02-27 PROCEDURE — 99204 OFFICE O/P NEW MOD 45 MIN: CPT | Performed by: PSYCHIATRY & NEUROLOGY

## 2022-02-27 PROCEDURE — 83690 ASSAY OF LIPASE: CPT | Performed by: EMERGENCY MEDICINE

## 2022-02-27 PROCEDURE — 250N000011 HC RX IP 250 OP 636: Performed by: PSYCHIATRY & NEUROLOGY

## 2022-02-27 PROCEDURE — 36415 COLL VENOUS BLD VENIPUNCTURE: CPT | Performed by: EMERGENCY MEDICINE

## 2022-02-27 PROCEDURE — 96375 TX/PRO/DX INJ NEW DRUG ADDON: CPT

## 2022-02-27 RX ORDER — ONDANSETRON 4 MG/1
4 TABLET, FILM COATED ORAL ONCE
Status: COMPLETED | OUTPATIENT
Start: 2022-02-27 | End: 2022-02-27

## 2022-02-27 RX ORDER — ONDANSETRON 4 MG/1
4 TABLET, FILM COATED ORAL EVERY 6 HOURS PRN
Qty: 20 TABLET | Refills: 0 | Status: SHIPPED | OUTPATIENT
Start: 2022-02-27 | End: 2023-06-09

## 2022-02-27 RX ORDER — MIRTAZAPINE 15 MG/1
15 TABLET, FILM COATED ORAL AT BEDTIME
Qty: 30 TABLET | Refills: 0 | Status: SHIPPED | OUTPATIENT
Start: 2022-02-27 | End: 2023-06-09

## 2022-02-27 RX ORDER — LORAZEPAM 2 MG/ML
1 INJECTION INTRAMUSCULAR ONCE
Status: COMPLETED | OUTPATIENT
Start: 2022-02-27 | End: 2022-02-27

## 2022-02-27 RX ORDER — ONDANSETRON 2 MG/ML
4 INJECTION INTRAMUSCULAR; INTRAVENOUS EVERY 30 MIN PRN
Status: DISCONTINUED | OUTPATIENT
Start: 2022-02-27 | End: 2022-02-27 | Stop reason: HOSPADM

## 2022-02-27 RX ORDER — DIAZEPAM 5 MG
5 TABLET ORAL ONCE
Status: COMPLETED | OUTPATIENT
Start: 2022-02-27 | End: 2022-02-27

## 2022-02-27 RX ADMIN — LORAZEPAM 1 MG: 2 INJECTION INTRAMUSCULAR; INTRAVENOUS at 09:12

## 2022-02-27 RX ADMIN — ONDANSETRON 4 MG: 2 INJECTION INTRAMUSCULAR; INTRAVENOUS at 09:07

## 2022-02-27 RX ADMIN — ONDANSETRON HYDROCHLORIDE 4 MG: 4 TABLET, FILM COATED ORAL at 14:57

## 2022-02-27 RX ADMIN — DIAZEPAM 5 MG: 5 TABLET ORAL at 14:57

## 2022-02-27 ASSESSMENT — ACTIVITIES OF DAILY LIVING (ADL): DRESS: STREET CLOTHES

## 2022-02-27 NOTE — DISCHARGE INSTRUCTIONS
Follow up with your psychiatrist    You were scheduled a therapy appointment per request for :    Date: Monday, 3/7/2022  Time: 12:00 pm - 1:00 pm  Provider: Corinna MANLEY  LICSW  Location: [facility], [address], [address], [place] [address]  Phone: [phone]  Type: Teletherapy            Aftercare Plan  If I am feeling unsafe or I am in a crisis, I will:   Contact my established care providers   Call the National Suicide Prevention Lifeline: 921.824.4877   Go to the nearest emergency room   Call 210     Warning signs that I or other people might notice when a crisis is developing for me: increased anxiety and panic feelings, irritability, isolation, inability to function    Things I am able to do on my own to cope or help me feel better: walking, going outside, reading, watching tv     Things that I am able to do with others to cope or help me feel better: talking to others, asking for help     Things I can use or do for distraction: walking     Changes I can make to support my mental health and wellness: being sober, seeing a therapist     People in my life that I can ask for help: my professional supports     Your [facility] has a mental health crisis team you can call 24/7: [facility] Mobile Crisis  [phone] (adults)  [phone] (children)    Other things that are important when I m in crisis: My children are important to me     Additional resources and information: below      Crisis Lines  Crisis Text Line  Text 485282  You will be connected with a trained live crisis counselor to provide support.    Por espanol, texto  WENCESLAO a 713485 o texto a 442-AYUDAME en WhatsApp    National Hope Line  1.800.SUICIDE [7286000]      Community Resources  Fast Tracker  Linking people to mental health and substance use disorder resources  fasttrackermn.org     [place] Mental [facility] Warm Line  Peer to peer support  Monday thru Saturday, 12 pm to  "10 pm  148.838.4534 or 8.208.837.2721  Text \"Support\" to 17701    National [place] [employer] (SHMUEL)  111.137.1209 or 1.888.SHMUEL.HELPS        Mental Health Apps  My3  https://myRiptide IOpp.org/    VirtualHopeBox  https://SocialStay.org/apps/virtual-hope-box/    "

## 2022-02-27 NOTE — PROGRESS NOTES
Pt discharged to home. Recheck apt, medications, safety plan reviewed, belonging were returned, has copy of AVS.

## 2022-02-27 NOTE — ED PROVIDER NOTES
History     Chief Complaint:  Anxiety       HPI   Megan Bettencourt is a 66 year old female who presents with concern for non-stop nausea and vomiting. She states that symptoms started yesterday. She has been having trouble eating for the past 3 days. She reported to nursing that she has been having a panic attack. She feels worse when she talks about it and retches during the interview.  She denies any abdominal pain.  She states that she has not had any alcohol for the past 4 to 5 days.  She denies drinking heavily on a regular basis and no history of alcohol withdrawal.  She does not believe she is in alcohol withdrawal at this time.  After control of her symptoms she remains reluctant to discuss her stress but she does believe it secondary to anxiety and wishes to be further evaluated and treated for this.  She has a history of opiate use but states that she is not taking any Percocet or other opiates and has not been for several years.  She denies being in withdrawal for opiates.  Per nursing, it sounds like over the past few months which is 6 months she has had multiple deaths in her family and friend group.  Past records reveal that she does have a history of Gilbert syndrome.    ROS:  Review of Systems   As noted per HPI.  Remainder of a 10 point review of systems was negative.    Allergies:  Hydrocodone  Hydrocodone-Acetaminophen  Morphine Hcl     Medications:    acetaminophen (TYLENOL) 325 MG tablet  amphetamine-dextroamphetamine (ADDERALL) 10 MG tablet  escitalopram (LEXAPRO) 10 MG tablet  levothyroxine (SYNTHROID) 50 MCG tablet  metoprolol succinate ER (TOPROL-XL) 25 MG 24 hr tablet  oxyCODONE-acetaminophen (PERCOCET) 5-325 MG tablet  oxyCODONE-acetaminophen (PERCOCET) 5-325 MG tablet      Past Medical History:    Past Medical History:   Diagnosis Date     Anxiety      Cervical stenosis of spine      Pelvic fracture (H)      Thyroid disease      Vision disturbance      Patient Active Problem List    Diagnosis     Blunt trauma of multiple sites     Pneumothorax, closed, traumatic, initial encounter     Seizure (H)        Past Surgical History:    Past Surgical History:   Procedure Laterality Date     BREAST SURGERY      augmentation      SECTION       COSMETIC BLEPHAROPLASTY LOWER LIDS BILATERAL  2014    Procedure: COSMETIC BLEPHAROPLASTY LOWER LIDS BILATERAL;  Surgeon: Loi Mcpherson MD;  Location: [facility]     COSMETIC SURGERY      neck lift     ORTHOPEDIC SURGERY  2017    bilateral wrists and right heel fracture     REPAIR PTOSIS BILATERAL  2014    Procedure: REPAIR PTOSIS BILATERAL;  Surgeon: Loi Mcpherson MD;  Location: [facility]        Family History:    family history is not on file.    Social History:   reports that she has never smoked. She has never used smokeless tobacco. She reports current alcohol use. She reports that she does not use drugs.  PCP: Gunnar Sarkar     Physical Exam     Patient Vitals for the past 24 hrs:   BP Temp Temp src Pulse Resp SpO2   22 0725 (!) 165/86 97  F (36.1  C) Temporal 114 18 97 %        Physical Exam  General: Thin, she has not been taking any medication for nausea.   Eyes: PERRL, conjunctivae pink no scleral icterus or conjunctival injection  ENT:  Moist mucus membranes, posterior oropharynx clear without erythema or exudates  Respiratory:  Lungs clear to auscultation bilaterally, no crackles/rubs/wheezes.  Good air movement  CV: Normal rate and rhythm, no murmurs/rubs/gallops  GI:  Abdomen soft and non-distended.  Normoactive BS.  No tenderness, guarding or rebound  Skin: Warm, dry.  No rashes or petechiae  Musculoskeletal: No peripheral edema or calf tenderness  Neuro: Alert and oriented to person/place/time  Psychiatric: Normal affect      Emergency Department Course   ECG:  NSR 96 BPM.  QTc 454 ms.  No ST elevation/depression, q waves, twave inversion.  Normal intervals.  No delta wave, short pr or brugada  criteria.      Laboratory:  Labs Ordered and Resulted from Time of ED Arrival to Time of ED Departure   COMPREHENSIVE METABOLIC PANEL - Abnormal       Result Value    Sodium 133      Potassium 4.0      Chloride 93 (*)     Carbon Dioxide (CO2) 27      Anion Gap 13      Urea Nitrogen 18      Creatinine 0.66      Calcium 9.4      Glucose 159 (*)     Alkaline Phosphatase 80       (*)      (*)     Protein Total 8.1      Albumin 4.0      Bilirubin Total 2.8 (*)     GFR Estimate >90     CBC WITH PLATELETS AND DIFFERENTIAL - Abnormal    WBC Count 6.6      RBC Count 4.41      Hemoglobin 13.8      Hematocrit 41.9      MCV 95      MCH 31.3      MCHC 32.9      RDW 13.3      Platelet Count 225      % Neutrophils 84      % Lymphocytes 7      % Monocytes 7      % Eosinophils 0      % Basophils 1      % Immature Granulocytes 1      NRBCs per 100 WBC 0      Absolute Neutrophils 5.6      Absolute Lymphocytes 0.5 (*)     Absolute Monocytes 0.5      Absolute Eosinophils 0.0      Absolute Basophils 0.0      Absolute Immature Granulocytes 0.0      Absolute NRBCs 0.0     COVID-19 VIRUS (CORONAVIRUS) BY PCR - Normal    SARS CoV2 PCR Negative     LIPASE - Normal    Lipase 119        Emergency Department Course:    Reviewed:  I reviewed nursing notes, vitals, past medical history and Care Everywhere    Assessments:  I obtained history and examined the patient as noted above.   I rechecked the patient.she is no longer vomiting.  She is resting comfortably.  She again denies any abdominal pain.  She wishes to be evaluated for her anxiety.      Interventions:  Medications   ondansetron (ZOFRAN) injection 4 mg (4 mg Intravenous Given 2/27/22 0907)   LORazepam (ATIVAN) injection 1 mg (1 mg Intravenous Given 2/27/22 0912)       Disposition:  The patient was transferred to [facility].     Impression & Plan    Covid-19  Megan Bettencourt was evaluated during a global COVID-19 pandemic, which necessitated consideration that the patient might  be at risk for infection with the SARS-CoV-2 virus that causes COVID-19.   Applicable protocols for evaluation were followed during the patient's care.   COVID-19 was considered as part of the patient's evaluation.   The plan for testing is:  a test was obtained during this visit.    Medical Decision Making:  Megan Bettencourt is a 66 year old female who presents for evaluation of severe anxiety along with associated nausea and vomiting.  There are no concerns for or signs at this point of suicide attempt or ingestion, no concerning findings on the remainder of physical exam.  Her liver enzymes are very slightly elevated and her bilirubin is elevated but she has a history of Gilbert syndrome and she has no abdominal pain whatsoever.  I do not believe she needs advanced imaging for this.  Her vomiting resolved and she is now feeling better and able to participate in further psychiatric evaluation.  The patient is medically [facility]. They were in agreement and are transferred to the [facility] unit in stable condition.    Diagnosis:    ICD-10-CM    1. Anxiety  F41.9    2. Nausea  R11.0         2/27/2022   Deena Van MD Cho, Amy C, MD  02/27/22 1006

## 2022-02-27 NOTE — PROGRESS NOTES
Pt snacked on applesauce, has breakfast food she is eating. She seems to be more relaxed. [facility] has visited with her.
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
## 2022-02-27 NOTE — ED PROVIDER NOTES
EmPATH Unit - Psychiatric Consultation  [facility] Emergency Department    Megan Bettencourt MRN: [id number]   Age: 66 year old YOB: 1955     History     Chief Complaint   Patient presents with     Anxiety     HPI  Megan Bettencourt is a 66 year old female with history notable for substance use disorders who presented to the emergency room reporting nausea and vomiting.  She had identified some psychosocial stressors as well as emotional distress related to prolonged post-COVID symptoms leading to her transfer to the EmPATH unit for psychiatric assessment.  Emergency room documentation indicates that the patient was determined to be medically stable although laboratory abnormalities were noted indicating elevated liver enzymes and bilirubin.  On examination, the patient highlights her main concern as being nausea which has been progressively worsening over the past 2 to 3 days.  She explains that she last saw her outpatient psychiatrist Dr. Guaman who had prescribed Viibryd aimed at treating her mood and anxiety symptoms.  She did not like the way she felt and decided to discontinue the medication.  Since then, she has noticed emerging nausea and general malaise.  She states that her last alcoholic beverage was approximately 4 days ago however denied daily or excessive usage preceding that.  Her prescription history reveals a prescription for Xanax although the patient denies misuse or overusage.  She denied any recent opiate usage.  She identified her main stressor is being related to chronic pain and physical discomfort stemming from a post-COVID syndrome which has been ongoing for over 6 months.  She denied suicidal and homicidal thoughts.  There was no indication of psychosis.  She finds it difficult to tolerate this setting, citing that there were no windows for her to look out of for adequate room for her to walk around him.  She is requesting to discharge home today.    Past Medical History  Past  Medical History:   Diagnosis Date     Anxiety      Cervical stenosis of spine      Pelvic fracture (H)      Thyroid disease      Vision disturbance      Past Surgical History:   Procedure Laterality Date     BREAST SURGERY      augmentation      SECTION       COSMETIC BLEPHAROPLASTY LOWER LIDS BILATERAL  2014    Procedure: COSMETIC BLEPHAROPLASTY LOWER LIDS BILATERAL;  Surgeon: Loi Mcpherson MD;  Location: [facility] [place]     COSMETIC SURGERY      neck lift     ORTHOPEDIC SURGERY  2017    bilateral wrists and right heel fracture     REPAIR PTOSIS BILATERAL  2014    Procedure: REPAIR PTOSIS BILATERAL;  Surgeon: Loi Mcpherson MD;  Location: [facility] [place]     acetaminophen (TYLENOL) 325 MG tablet      Allergies   Allergen Reactions     Hydrocodone      Hydrocodone-Acetaminophen Itching     In higher doses     Morphine Hcl Itching     Family History  No family history on file.  Social History   Social History     Tobacco Use     Smoking status: Never Smoker     Smokeless tobacco: Never Used   Substance Use Topics     Alcohol use: Yes     Alcohol/week: 0.0 standard drinks     Comment: occasionally     Drug use: No      Past medical history, past surgical history, medications, allergies, family history, and social history were reviewed with the patient. No additional pertinent items.       Review of Systems  A complete review of systems was performed with pertinent positives and negatives noted in the HPI, and all other systems negative.    Physical Examination   BP: (!) 165/86  Pulse: 114  Temp: 97  F (36.1  C)  Resp: 18  Weight: 52.2 kg (115 lb 3 oz)  SpO2: 97 %    Physical Exam  General: Appears stated age.   Neuro: Alert and fully oriented. Extremities appear to demonstrate normal strength on visual inspection.   Integumentary/Skin: no rash visualized, normal color    Psychiatric Examination   Appearance: awake, alert  Attitude:  cooperative  Eye Contact:  fair  Mood:  anxious  Affect:  mood  congruent  Speech:  clear, coherent  Psychomotor Behavior:  no evidence of tardive dyskinesia, dystonia, or tics and tremor observed   Thought Process:  logical and linear  Associations:  no loose associations  Thought Content:  no evidence of suicidal ideation or homicidal ideation and no evidence of psychotic thought  Insight:  fair  Judgement:  intact  Oriented to:  time, person, and place  Attention Span and Concentration:  intact  Recent and Remote Memory:  fair  Language: able to name/identify objects without impairment  Fund of Knowledge: intact with awareness of current and past events    ED Course        Labs Ordered and Resulted from Time of ED Arrival to Time of ED Departure   COMPREHENSIVE METABOLIC PANEL - Abnormal       Result Value    Sodium 133      Potassium 4.0      Chloride 93 (*)     Carbon Dioxide (CO2) 27      Anion Gap 13      Urea Nitrogen 18      Creatinine 0.66      Calcium 9.4      Glucose 159 (*)     Alkaline Phosphatase 80       (*)      (*)     Protein Total 8.1      Albumin 4.0      Bilirubin Total 2.8 (*)     GFR Estimate >90     CBC WITH PLATELETS AND DIFFERENTIAL - Abnormal    WBC Count 6.6      RBC Count 4.41      Hemoglobin 13.8      Hematocrit 41.9      MCV 95      MCH 31.3      MCHC 32.9      RDW 13.3      Platelet Count 225      % Neutrophils 84      % Lymphocytes 7      % Monocytes 7      % Eosinophils 0      % Basophils 1      % Immature Granulocytes 1      NRBCs per 100 WBC 0      Absolute Neutrophils 5.6      Absolute Lymphocytes 0.5 (*)     Absolute Monocytes 0.5      Absolute Eosinophils 0.0      Absolute Basophils 0.0      Absolute Immature Granulocytes 0.0      Absolute NRBCs 0.0     COVID-19 VIRUS (CORONAVIRUS) BY PCR - Normal    SARS CoV2 PCR Negative     LIPASE - Normal    Lipase 119         Assessments & Plan (with Medical Decision Making)   Patient presenting with nausea, poor appetite, general malaise, and psychosocial stressors.  Her history is  complicated by substance use disorders although she denies significant usage to warrant withdrawal despite her presentation today and laboratory abnormalities.  Nursing notes reviewed noting no acute issues.     I have reviewed the assessment completed by the [facility].     Preliminary diagnosis:    ICD-10-CM    1. Anxiety  F41.9     suspect benzo or alcohol withdrawl   2. Nausea  R11.0    3. Adjustment disorder with anxious mood  F43.22         Treatment Plan:  -I reviewed with the patient concerned that she may be experiencing withdrawal symptoms, either from alcohol usage or benzodiazepine use.  After reviewing her laboratory results which indicate elevated transaminases in the context of alcohol usage and benzodiazepine use, further emphasized her clinical presentation and history of substance use disorders.  She minimized concern regarding substance usage.  -The patient is not agreeable to participate in a chemical health assessment to aid in obtaining resources for substance use disorder treatment.  -Begin Remeron 15 mg at bedtime targeting reduction of mood and anxiety symptoms with secondary benefits as an antiemetic agent.  Other benefits may involve normalization of her appetite.  -Zofran 4 mg as needed for nausea  -One-time dose of Valium 5 mg to address suspected withdrawal from either alcohol or benzodiazepines.  -I encouraged that the patient continue her stay on the EmPATH unit to allow further observation of her symptoms while aiming to reduce complications related to substance withdrawal however she was not agreeable.  At this time, she does not meet criteria to be placed under an involuntary hold and will be discharged home per her request.    After a period of working with the treatment team on the EmPATH unit, the patient's mental state improved to allow a safe transition to outpatient care. After counseling on the diagnosis, work-up, and treatment plan, the patient was discharged. Close follow-up  with a psychiatrist and/or therapist was recommended and community psychiatric resources were provided. Patient is to return to the ED if any urgent or potentially life-threatening concerns.     At the time of discharge, the patient's acute suicide risk was determined to be low due to the following factors: Reduction in the intensity of mood/anxiety symptoms that preceded the admission, denial of suicidal thoughts, denies feeling helpless or helpless, not currently under the influence of alcohol or illicit substances, denies experiencing command hallucinations, no immediate access to firearms. The patient's acute risk could be higher if noncompliant with their treatment plan, medications, follow-up appointments or using illicit substances or alcohol. Protective factors include: social supports, children, stable housing      --  Hermelindo Sanders MD   [facility] EMERGENCY DEPT  EmPATH Unit  2/27/2022      Hermelindo Sanders MD  02/27/22 1517

## 2022-02-27 NOTE — PROGRESS NOTES
"Pt resting in chair, calm. Ate about 25% of her meal and is tolerating. She is voicing she does not want to stay her the night, \" I would feel better just going home if I can get some meds.\" Waiting to see MD.   "

## 2022-02-27 NOTE — PROGRESS NOTES
Pt has met with MD and does not wish to stay here the NOC. Comfort meds were given as ordered(valium and zofran po).  Pt does appear to having withdrawals but denies. Vitals are stable, she has nausea, and the hand tremors are more notable.informed her to provide urine sample while waiting for recheck apts to be made, the will review her AVS.

## 2022-10-23 ENCOUNTER — HEALTH MAINTENANCE LETTER (OUTPATIENT)
Age: 67
End: 2022-10-23

## 2023-05-29 PROCEDURE — 99285 EMERGENCY DEPT VISIT HI MDM: CPT | Mod: 25

## 2023-05-30 ENCOUNTER — HOSPITAL ENCOUNTER (OUTPATIENT)
Facility: CLINIC | Age: 68
Setting detail: OBSERVATION
Discharge: HOME | End: 2023-05-30
Attending: EMERGENCY MEDICINE | Admitting: INTERNAL MEDICINE
Payer: COMMERCIAL

## 2023-05-30 ENCOUNTER — APPOINTMENT (OUTPATIENT)
Dept: GENERAL RADIOLOGY | Facility: CLINIC | Age: 68
End: 2023-05-30
Attending: EMERGENCY MEDICINE
Payer: COMMERCIAL

## 2023-05-30 VITALS
TEMPERATURE: 98.2 F | DIASTOLIC BLOOD PRESSURE: 116 MMHG | HEART RATE: 75 BPM | SYSTOLIC BLOOD PRESSURE: 179 MMHG | RESPIRATION RATE: 16 BRPM | HEIGHT: 67 IN | WEIGHT: 120 LBS | OXYGEN SATURATION: 94 % | BODY MASS INDEX: 18.83 KG/M2

## 2023-05-30 DIAGNOSIS — L08.9 RIGHT FOOT INFECTION: ICD-10-CM

## 2023-05-30 DIAGNOSIS — I10 BENIGN ESSENTIAL HYPERTENSION: ICD-10-CM

## 2023-05-30 LAB
ANION GAP SERPL CALCULATED.3IONS-SCNC: 14 MMOL/L (ref 7–15)
BASOPHILS # BLD AUTO: 0 10E3/UL (ref 0–0.2)
BASOPHILS NFR BLD AUTO: 1 %
BUN SERPL-MCNC: 8.2 MG/DL (ref 8–23)
CALCIUM SERPL-MCNC: 9.2 MG/DL (ref 8.8–10.2)
CHLORIDE SERPL-SCNC: 99 MMOL/L (ref 98–107)
CREAT SERPL-MCNC: 0.71 MG/DL (ref 0.51–0.95)
CRP SERPL-MCNC: <3 MG/L
DEPRECATED HCO3 PLAS-SCNC: 25 MMOL/L (ref 22–29)
EOSINOPHIL # BLD AUTO: 0.2 10E3/UL (ref 0–0.7)
EOSINOPHIL NFR BLD AUTO: 2 %
ERYTHROCYTE [DISTWIDTH] IN BLOOD BY AUTOMATED COUNT: 13.5 % (ref 10–15)
ERYTHROCYTE [SEDIMENTATION RATE] IN BLOOD BY WESTERGREN METHOD: 2 MM/HR (ref 0–30)
GFR SERPL CREATININE-BSD FRML MDRD: >90 ML/MIN/1.73M2
GLUCOSE SERPL-MCNC: 92 MG/DL (ref 70–99)
HCT VFR BLD AUTO: 39.2 % (ref 35–47)
HGB BLD-MCNC: 13.2 G/DL (ref 11.7–15.7)
HOLD SPECIMEN: NORMAL
IMM GRANULOCYTES # BLD: 0 10E3/UL
IMM GRANULOCYTES NFR BLD: 0 %
LACTATE SERPL-SCNC: 1.2 MMOL/L (ref 0.7–2)
LYMPHOCYTES # BLD AUTO: 1.9 10E3/UL (ref 0.8–5.3)
LYMPHOCYTES NFR BLD AUTO: 24 %
MCH RBC QN AUTO: 31.7 PG (ref 26.5–33)
MCHC RBC AUTO-ENTMCNC: 33.7 G/DL (ref 31.5–36.5)
MCV RBC AUTO: 94 FL (ref 78–100)
MONOCYTES # BLD AUTO: 1.1 10E3/UL (ref 0–1.3)
MONOCYTES NFR BLD AUTO: 14 %
NEUTROPHILS # BLD AUTO: 4.7 10E3/UL (ref 1.6–8.3)
NEUTROPHILS NFR BLD AUTO: 59 %
NRBC # BLD AUTO: 0 10E3/UL
NRBC BLD AUTO-RTO: 0 /100
PLATELET # BLD AUTO: 219 10E3/UL (ref 150–450)
POTASSIUM SERPL-SCNC: 4 MMOL/L (ref 3.4–5.3)
RBC # BLD AUTO: 4.17 10E6/UL (ref 3.8–5.2)
SODIUM SERPL-SCNC: 138 MMOL/L (ref 136–145)
WBC # BLD AUTO: 7.9 10E3/UL (ref 4–11)

## 2023-05-30 PROCEDURE — 85652 RBC SED RATE AUTOMATED: CPT | Performed by: EMERGENCY MEDICINE

## 2023-05-30 PROCEDURE — 80048 BASIC METABOLIC PNL TOTAL CA: CPT | Performed by: EMERGENCY MEDICINE

## 2023-05-30 PROCEDURE — 86140 C-REACTIVE PROTEIN: CPT | Performed by: EMERGENCY MEDICINE

## 2023-05-30 PROCEDURE — 83605 ASSAY OF LACTIC ACID: CPT | Performed by: EMERGENCY MEDICINE

## 2023-05-30 PROCEDURE — 250N000011 HC RX IP 250 OP 636: Performed by: EMERGENCY MEDICINE

## 2023-05-30 PROCEDURE — 36415 COLL VENOUS BLD VENIPUNCTURE: CPT | Performed by: EMERGENCY MEDICINE

## 2023-05-30 PROCEDURE — 85025 COMPLETE CBC W/AUTO DIFF WBC: CPT | Performed by: EMERGENCY MEDICINE

## 2023-05-30 PROCEDURE — 87040 BLOOD CULTURE FOR BACTERIA: CPT | Performed by: EMERGENCY MEDICINE

## 2023-05-30 PROCEDURE — G0378 HOSPITAL OBSERVATION PER HR: HCPCS

## 2023-05-30 PROCEDURE — 250N000013 HC RX MED GY IP 250 OP 250 PS 637: Performed by: EMERGENCY MEDICINE

## 2023-05-30 PROCEDURE — 73630 X-RAY EXAM OF FOOT: CPT | Mod: RT

## 2023-05-30 RX ORDER — OXYCODONE HYDROCHLORIDE 5 MG/1
5 TABLET ORAL ONCE
Status: COMPLETED | OUTPATIENT
Start: 2023-05-30 | End: 2023-05-30

## 2023-05-30 RX ORDER — CEFAZOLIN SODIUM 1 G/3ML
1 INJECTION, POWDER, FOR SOLUTION INTRAMUSCULAR; INTRAVENOUS ONCE
Status: COMPLETED | OUTPATIENT
Start: 2023-05-30 | End: 2023-05-30

## 2023-05-30 RX ORDER — AMPICILLIN AND SULBACTAM 2; 1 G/1; G/1
3 INJECTION, POWDER, FOR SOLUTION INTRAMUSCULAR; INTRAVENOUS ONCE
Status: COMPLETED | OUTPATIENT
Start: 2023-05-30 | End: 2023-05-30

## 2023-05-30 RX ORDER — METOPROLOL SUCCINATE 25 MG/1
25 TABLET, EXTENDED RELEASE ORAL DAILY
Qty: 30 TABLET | Refills: 0 | Status: SHIPPED | OUTPATIENT
Start: 2023-05-30 | End: 2023-06-29

## 2023-05-30 RX ADMIN — AMPICILLIN SODIUM AND SULBACTAM SODIUM 3 G: 2; 1 INJECTION, POWDER, FOR SOLUTION INTRAMUSCULAR; INTRAVENOUS at 05:22

## 2023-05-30 RX ADMIN — CEFAZOLIN 1 G: 1 INJECTION, POWDER, FOR SOLUTION INTRAMUSCULAR; INTRAVENOUS at 04:39

## 2023-05-30 RX ADMIN — OXYCODONE HYDROCHLORIDE 5 MG: 5 TABLET ORAL at 03:49

## 2023-05-30 ASSESSMENT — ACTIVITIES OF DAILY LIVING (ADL)
ADLS_ACUITY_SCORE: 33
ADLS_ACUITY_SCORE: 35

## 2023-05-30 NOTE — ED NOTES
Seen and examined by hospitalist ,will go home with instructions.  Will re start her meds for hypertension,no neuro deficit at present.

## 2023-05-30 NOTE — DISCHARGE INSTRUCTIONS
Discharge Instructions  Cellulitis    Cellulitis is an infection of the skin that occurs when bacteria enter the skin.   Symptoms are generally redness, swelling, warmth and pain.  Your infection appeared to be appropriate to treat at home with antibiotics.  However, sometimes your infection may be worse than it seemed at first, or may worsen with time. If you have new or worse symptoms, you may need to be seen again in the Emergency Department or by your primary provider.    Generally, every Emergency Department visit should have a follow-up clinic visit with either a primary or a specialty clinic/provider. Please follow-up as instructed by your emergency provider today.    Return to the Emergency Department if:  The redness, pain, or swelling gets a lot worse.  If the red area was marked, return if it is red significantly beyond the marked area.  You are unable to get your antibiotics, or are vomiting (throwing up) these pills, or you cannot take them.  You are feeling more ill, weak or lightheaded.  You start to run a new fever (temperature >101 F).  Anything else about the infection worries or concerns you.  Treatment:  Start your antibiotics right away, and take them as prescribed. Be sure to finish the whole prescription, even if you are better.  Apply a heating pad, warm packs, or warm water soaks to the infected area for 15 minutes at a time, at least 3 times a day. Do not use a heating pad on your feet or legs if you have diabetes. Do not sleep with a heating pad on, since this can cause burns or skin injury.  Rest your injured area for at least 1-2 days. After that you may start using your extremity again as long as there is not too much pain.   Raise the injured area above the level of your heart as much as possible in the first 1-2 days.  Tylenol  (acetaminophen), Motrin  (ibuprofen), or Advil  (ibuprofen) may help may help reduce pain and fever and may help you feel more comfortable. Be sure to read and  follow the package directions, and ask your provider if you have questions.    If you were given a prescription for medicine here today, be sure to read all of the information (including the package insert) that comes with your prescription.  This will include important information about the medicine, its side effects, and any warnings that you need to know about.  The pharmacist who fills the prescription can provide more information and answer questions you may have about the medicine.  If you have questions or concerns that the pharmacist cannot address, please call or return to the Emergency Department.     Remember that you can always come back to the Emergency Department if you are not able to see your regular provider in the amount of time listed above, if you get any new symptoms, or if there is anything that worries you.    Discharge Instructions  Hypertension - High Blood Pressure    During you visit to the Emergency Department, your blood pressure was higher than the recommended blood pressure.  This may be related to stress, pain, medication or other temporary conditions. In these cases, your blood pressure may return to normal on its own. If you have a history of high blood pressure, you may need to have your provider adjust your medications. Sometimes, your high measurement here may indicate that you have developed high blood pressure that will stay high unless it is treated. As a general rule, high blood pressure causes problems over years rather than days, weeks, or months. So, while it is important to treat blood pressure, it is rarely important to treat blood pressure immediately. Occasionally we will begin a medication in the Emergency Department; more often we will recommend close follow-up for medications with a primary doctor/clinic.    Generally, every Emergency Department visit should have a follow-up clinic visit with either a primary or a specialty clinic/provider. Please follow-up as  instructed by your emergency provider today.    Return to the Emergency Department if you start to have:  A severe headache.  Chest pain.  Shortness of breath.  Weakness or numbness that affects one part of the body.  Confusion.  Vision changes.  Significant swelling of legs and/or eyes.  A reaction to any medication started in the Emergency Department.    What can I do to help myself?  Avoid alcohol.  Take any blood pressure medicine that you are prescribed.  Get a good night s sleep.  Lower your salt intake.  Exercise.  Lose weight.  Manage stress.  See your doctor regularly    If blood pressure medication was started in the Emergency Department:  The medicine may not have an immediate effect. The body and brain determine what blood pressure you have. The medicine s job is to retrain the body s  thermostat  to a lower blood pressure.  You will need to follow up with your provider to see how this medicine is working for you.  If you were given a prescription for medicine here today, be sure to read all of the information (including the package insert) that comes with your prescription.  This will include important information about the medicine, its side effects, and any warnings that you need to know about.  The pharmacist who fills the prescription can provide more information and answer questions you may have about the medicine.  If you have questions or concerns that the pharmacist cannot address, please call or return to the Emergency Department.   Remember that you can always come back to the Emergency Department if you are not able to see your regular provider in the amount of time listed above, if you get any new symptoms, or if there is anything that worries you.

## 2023-05-30 NOTE — ED PROVIDER NOTES
"  History     Chief Complaint:  Ankle Pain       The history is provided by the patient.      Megan Bettencourt is a 67 year old female with a history of hypertension who present with right ankle pain after surgery. Patient reports she had ankle surgery May 3rd where they removed hardware at [facility]. Patient reports since then her ankle is still infected and says she presented to the urgent care about 16 days ago where they put her on antibiotics. Patient says she finished the antibiotics 9 days ago. She reports the antibiotics gave her a little relief but thinks they may have worsened the infection. Patient reports she noticed an increase in swelling and redness as well. She reports an increase of oozing. Patient also reports she missed a post op appointment with her surgeon on May 23. Patient reports waking up in the morning with sweats and fevers. She denies experiencing this before her ankle surgery.     Independent Historian:   None - Patient Only    Review of External Notes:   Reviewed urgent care note from 5/14/23.       Medications:    Remeron   Synthroid  Adderall  Effexor    Past Medical History:    Anxiety   Thyroid disease  Hypertension   ADD  Hypothyroidism  Insomnia   Gilbert syndrome  Homocystinemia  Pure hypercholesterolemia  Seizure  Pneumothorax    Past Surgical History:    Breast surgery   Orthopedic surgery   Repair ptosis bilateral       Physical Exam     Patient Vitals for the past 24 hrs:   BP Temp Temp src Pulse Resp SpO2 Height Weight   05/30/23 0529 -- -- -- -- -- 94 % -- --   05/30/23 0514 (!) 179/116 -- -- -- -- -- -- --   05/30/23 0501 (!) 168/110 -- -- 75 16 95 % -- --   05/29/23 2359 (!) 160/107 98.2  F (36.8  C) Oral 105 16 95 % 1.702 m (5' 7\") 54.4 kg (120 lb)        Physical Exam  Head:  The scalp, face, and head appear normal  Eyes:  Conjunctivae are normal  ENT:    The nose is normal    Pinnae are normal  Neck:  Trachea midline  CV:  Mild tachycardia. Normal DP pulse.   Resp:  No " respiratory distress   Musc:  Normal muscular tone  Skin:  Right foot shows partial dehiscence of surgical wound with sutures still in place, surrounding erythema and warmth. No current drainage.  Neuro: Speech is normal and fluent. Face is symmetric. Moving all extremities well.         Emergency Department Course       Imaging:  Foot  XR, G/E 3 views, right   Final Result   IMPRESSION: Previous hardware tracks noted in the calcaneus. No acute, displaced fracture or dislocation. Mild soft tissue swelling over the calcaneus.         Report per radiology    Laboratory:  Labs Ordered and Resulted from Time of ED Arrival to Time of ED Departure   BASIC METABOLIC PANEL - Normal       Result Value    Sodium 138      Potassium 4.0      Chloride 99      Carbon Dioxide (CO2) 25      Anion Gap 14      Urea Nitrogen 8.2      Creatinine 0.71      Calcium 9.2      Glucose 92      GFR Estimate >90     LACTIC ACID WHOLE BLOOD - Normal    Lactic Acid 1.2     CRP INFLAMMATION - Normal    CRP Inflammation <3.00     ERYTHROCYTE SEDIMENTATION RATE AUTO - Normal    Erythrocyte Sedimentation Rate 2     CBC WITH PLATELETS AND DIFFERENTIAL    WBC Count 7.9      RBC Count 4.17      Hemoglobin 13.2      Hematocrit 39.2      MCV 94      MCH 31.7      MCHC 33.7      RDW 13.5      Platelet Count 219      % Neutrophils 59      % Lymphocytes 24      % Monocytes 14      % Eosinophils 2      % Basophils 1      % Immature Granulocytes 0      NRBCs per 100 WBC 0      Absolute Neutrophils 4.7      Absolute Lymphocytes 1.9      Absolute Monocytes 1.1      Absolute Eosinophils 0.2      Absolute Basophils 0.0      Absolute Immature Granulocytes 0.0      Absolute NRBCs 0.0     BLOOD CULTURE   BLOOD CULTURE          Emergency Department Course & Assessments:             Interventions:  Medications   ceFAZolin (ANCEF) 1 g vial to attach to  ml bag for ADULT or 50 ml bag for PEDS (1 g Intravenous $New Bag 5/30/23 4709)   oxyCODONE (ROXICODONE) tablet  5 mg (5 mg Oral $Given 5/30/23 6913)   ampicillin-sulbactam (UNASYN) 3 g vial to attach to  mL bag (3 g Intravenous $New Bag 5/30/23 5246)        Assessments:  0321 I obtained history and examined the patient as noted above.   0415 Rechecked. Pt in agreement with plan for admission.     Independent Interpretation (X-rays, CTs, rhythm strip):  I independently reviewed foot x-ray. No obvious cortical destruction.     Consultations/Discussion of Management or Tests:  0429 I spoke with Dr. Mckeon, Hospitalist, regarding the patient's history and presentation in the emergency department today.           Social Determinants of Health affecting care:   Healthcare Access/Compliance    Disposition:  The patient was discharged home.     Impression & Plan      Medical Decision Making:  Pt presents with CC right foot infection. Pt had surgery almost a month ago. Has been on antibiotics and hasn't followed-up with her surgeon. She arrives mildly tachycardic, hypertensive. Reports night sweats. No leukocytosis. Normal CRP/ESR certainly argues against systemic infection. Initially plan was to admit to the hospital on IV antibiotics for orthopedics to consult today. I discussed case with hospitalist, Dr. Mckeon, who accepted for admission. Upon Dr. Mckeon's discussion with pt, she would prefer to discharge home and be seen in clinic. Pt started on augmentin for treatment. Dr. Mckeon also discussed antihypertensive treatment with patient and recommended starting 25mg of toprol XL for treatment until pt can follow-up with her primary care physician. Pt ultimately discharged in stable condition. She promises she will follow-up with orthopedics in the near future.       Diagnosis:    ICD-10-CM    1. Right foot infection  L08.9       2. Benign essential hypertension  I10            Discharge Medications:  New Prescriptions    AMOXICILLIN-CLAVULANATE (AUGMENTIN) 875-125 MG TABLET    Take 1 tablet by mouth 2 times daily for 7 days     METOPROLOL SUCCINATE ER (TOPROL XL) 25 MG 24 HR TABLET    Take 1 tablet (25 mg) by mouth daily for 30 days          Scribe Disclosure:  I, PRINCESS TOYIN, am serving as a scribe at 2:35 AM on 5/30/2023 to document services personally performed by Mookie Joe MD based on my observations and the provider's statements to me.        Mookie Joe MD  05/30/23 4730

## 2023-05-30 NOTE — ED TRIAGE NOTES
Pt states that she had 5 pins taken out of her calcaneous 2 weeks ago; states that she has been dealing with an infection for the last week.  Went to urgent care and got placed on antibiotics.  States that they were done a couple days ago.

## 2023-05-30 NOTE — PROGRESS NOTES
Brief note:     Initially paged to admit patient to observation status while awaiting [facility] orthopedic evaluation of right ankle wound dehiscence following hardware removal.    Surgery May 3 complicated by wound dehiscence and evaluation at urgent care with Keflex prescription May 14.  Missed her orthopedic surgery follow-up May 23, and now returns to the emergency department with increased redness of her lateral right ankle/heel.    She describes clear fluid draining, worse when she is ambulating.  CRP is undetectable.  No leukocytosis.  Has some pink healing tissue surrounding suture line which does not appear grossly infected.  She has a photo from earlier in the day 5/29 which has increased erythema and swelling.  This is after she had been ambulating on her foot throughout the day; suggestive of gravity dependent increase in erythema.   rapid improvement in erythema after elevating lower extremity most likely; unlikely that initial dose of anti-infective administered in the emergency department would have improved erythema from cellulitis.    Patient reports that she has night sweats, though these have been present and occurring prior to surgical intervention.  She tells me that she mentioned this to her primary care provider at her preop exam, and was told this was likely related to menopause.  No rigors.    Patient has noted that her blood pressure has been increased.  Has been checking this over the past few weeks.  She has photos from a blood pressure cuff with blood pressures ranging from the 140 systolic range into the 170s systolic range.  Heart rates in the 80s to 119.  Had been on antihypertensives through her primary care team, though these were discontinued approximately 6 years ago.  Suggested metoprolol    When I went to evaluate patient in obtaining history, she was under the impression that she was awaiting [facility] evaluation in the emergency department and not being admitted.  She preferred  discharge and follow-up with [facility] at [facility].  She appears safe to do so.  Being sent out with a prescription for metoprolol XL which could be adjusted by her primary care team as well as a course of Augmentin.    Paul Mckeon MD

## 2023-06-04 LAB
BACTERIA BLD CULT: NO GROWTH
BACTERIA BLD CULT: NO GROWTH

## 2023-06-09 ENCOUNTER — APPOINTMENT (OUTPATIENT)
Dept: CT IMAGING | Facility: CLINIC | Age: 68
End: 2023-06-09
Attending: EMERGENCY MEDICINE
Payer: COMMERCIAL

## 2023-06-09 ENCOUNTER — HOSPITAL ENCOUNTER (OUTPATIENT)
Facility: CLINIC | Age: 68
Setting detail: OBSERVATION
Discharge: HOME | End: 2023-06-10
Attending: EMERGENCY MEDICINE | Admitting: INTERNAL MEDICINE
Payer: COMMERCIAL

## 2023-06-09 DIAGNOSIS — I16.0 HYPERTENSIVE URGENCY: ICD-10-CM

## 2023-06-09 DIAGNOSIS — R42 LIGHTHEADED: ICD-10-CM

## 2023-06-09 DIAGNOSIS — R51.9 NONINTRACTABLE HEADACHE, UNSPECIFIED CHRONICITY PATTERN, UNSPECIFIED HEADACHE TYPE: ICD-10-CM

## 2023-06-09 DIAGNOSIS — F10.930 ALCOHOL WITHDRAWAL SYNDROME WITHOUT COMPLICATION (H): ICD-10-CM

## 2023-06-09 LAB
ALBUMIN SERPL BCG-MCNC: 4.5 G/DL (ref 3.5–5.2)
ALP SERPL-CCNC: 85 U/L (ref 35–104)
ALT SERPL W P-5'-P-CCNC: 48 U/L (ref 10–35)
ANION GAP SERPL CALCULATED.3IONS-SCNC: 15 MMOL/L (ref 7–15)
AST SERPL W P-5'-P-CCNC: 63 U/L (ref 10–35)
ATRIAL RATE - MUSE: 79 BPM
BASOPHILS # BLD AUTO: 0.1 10E3/UL (ref 0–0.2)
BASOPHILS NFR BLD AUTO: 1 %
BILIRUB DIRECT SERPL-MCNC: <0.2 MG/DL (ref 0–0.3)
BILIRUB SERPL-MCNC: 0.9 MG/DL
BUN SERPL-MCNC: 12.1 MG/DL (ref 8–23)
CALCIUM SERPL-MCNC: 9 MG/DL (ref 8.8–10.2)
CHLORIDE SERPL-SCNC: 96 MMOL/L (ref 98–107)
CREAT SERPL-MCNC: 0.57 MG/DL (ref 0.51–0.95)
DEPRECATED HCO3 PLAS-SCNC: 26 MMOL/L (ref 22–29)
DIASTOLIC BLOOD PRESSURE - MUSE: NORMAL MMHG
EOSINOPHIL # BLD AUTO: 0 10E3/UL (ref 0–0.7)
EOSINOPHIL NFR BLD AUTO: 1 %
ERYTHROCYTE [DISTWIDTH] IN BLOOD BY AUTOMATED COUNT: 14 % (ref 10–15)
GFR SERPL CREATININE-BSD FRML MDRD: >90 ML/MIN/1.73M2
GLUCOSE SERPL-MCNC: 186 MG/DL (ref 70–99)
HCT VFR BLD AUTO: 41.9 % (ref 35–47)
HGB BLD-MCNC: 13.9 G/DL (ref 11.7–15.7)
HOLD SPECIMEN: NORMAL
HOLD SPECIMEN: NORMAL
IMM GRANULOCYTES # BLD: 0 10E3/UL
IMM GRANULOCYTES NFR BLD: 0 %
INTERPRETATION ECG - MUSE: NORMAL
LYMPHOCYTES # BLD AUTO: 0.9 10E3/UL (ref 0.8–5.3)
LYMPHOCYTES NFR BLD AUTO: 19 %
MAGNESIUM SERPL-MCNC: 2.3 MG/DL (ref 1.7–2.3)
MCH RBC QN AUTO: 31.6 PG (ref 26.5–33)
MCHC RBC AUTO-ENTMCNC: 33.2 G/DL (ref 31.5–36.5)
MCV RBC AUTO: 95 FL (ref 78–100)
MONOCYTES # BLD AUTO: 0.6 10E3/UL (ref 0–1.3)
MONOCYTES NFR BLD AUTO: 12 %
NEUTROPHILS # BLD AUTO: 3 10E3/UL (ref 1.6–8.3)
NEUTROPHILS NFR BLD AUTO: 67 %
NRBC # BLD AUTO: 0 10E3/UL
NRBC BLD AUTO-RTO: 0 /100
P AXIS - MUSE: 79 DEGREES
PHOSPHATE SERPL-MCNC: 2.8 MG/DL (ref 2.5–4.5)
PLATELET # BLD AUTO: 373 10E3/UL (ref 150–450)
POTASSIUM SERPL-SCNC: 4.1 MMOL/L (ref 3.4–5.3)
PR INTERVAL - MUSE: 180 MS
PROT SERPL-MCNC: 7.5 G/DL (ref 6.4–8.3)
QRS DURATION - MUSE: 88 MS
QT - MUSE: 406 MS
QTC - MUSE: 465 MS
R AXIS - MUSE: -18 DEGREES
RBC # BLD AUTO: 4.4 10E6/UL (ref 3.8–5.2)
SODIUM SERPL-SCNC: 137 MMOL/L (ref 136–145)
SYSTOLIC BLOOD PRESSURE - MUSE: NORMAL MMHG
T AXIS - MUSE: 41 DEGREES
TROPONIN T SERPL HS-MCNC: 9 NG/L
VENTRICULAR RATE- MUSE: 79 BPM
WBC # BLD AUTO: 4.6 10E3/UL (ref 4–11)

## 2023-06-09 PROCEDURE — 96375 TX/PRO/DX INJ NEW DRUG ADDON: CPT

## 2023-06-09 PROCEDURE — 96361 HYDRATE IV INFUSION ADD-ON: CPT

## 2023-06-09 PROCEDURE — 99285 EMERGENCY DEPT VISIT HI MDM: CPT | Mod: 25

## 2023-06-09 PROCEDURE — 250N000009 HC RX 250: Performed by: INTERNAL MEDICINE

## 2023-06-09 PROCEDURE — 250N000013 HC RX MED GY IP 250 OP 250 PS 637: Performed by: INTERNAL MEDICINE

## 2023-06-09 PROCEDURE — 120N000001 HC R&B MED SURG/OB

## 2023-06-09 PROCEDURE — 85014 HEMATOCRIT: CPT | Performed by: EMERGENCY MEDICINE

## 2023-06-09 PROCEDURE — 93005 ELECTROCARDIOGRAM TRACING: CPT

## 2023-06-09 PROCEDURE — 83735 ASSAY OF MAGNESIUM: CPT | Performed by: INTERNAL MEDICINE

## 2023-06-09 PROCEDURE — 96376 TX/PRO/DX INJ SAME DRUG ADON: CPT

## 2023-06-09 PROCEDURE — 82310 ASSAY OF CALCIUM: CPT | Performed by: EMERGENCY MEDICINE

## 2023-06-09 PROCEDURE — 36415 COLL VENOUS BLD VENIPUNCTURE: CPT | Performed by: EMERGENCY MEDICINE

## 2023-06-09 PROCEDURE — 258N000003 HC RX IP 258 OP 636: Performed by: EMERGENCY MEDICINE

## 2023-06-09 PROCEDURE — 82248 BILIRUBIN DIRECT: CPT | Performed by: EMERGENCY MEDICINE

## 2023-06-09 PROCEDURE — 258N000001 HC RX 258: Performed by: INTERNAL MEDICINE

## 2023-06-09 PROCEDURE — 84484 ASSAY OF TROPONIN QUANT: CPT | Performed by: EMERGENCY MEDICINE

## 2023-06-09 PROCEDURE — 84100 ASSAY OF PHOSPHORUS: CPT | Performed by: INTERNAL MEDICINE

## 2023-06-09 PROCEDURE — 96374 THER/PROPH/DIAG INJ IV PUSH: CPT

## 2023-06-09 PROCEDURE — 70450 CT HEAD/BRAIN W/O DYE: CPT

## 2023-06-09 PROCEDURE — 250N000011 HC RX IP 250 OP 636: Performed by: INTERNAL MEDICINE

## 2023-06-09 PROCEDURE — 258N000003 HC RX IP 258 OP 636: Performed by: INTERNAL MEDICINE

## 2023-06-09 PROCEDURE — 250N000011 HC RX IP 250 OP 636: Performed by: EMERGENCY MEDICINE

## 2023-06-09 PROCEDURE — 99222 1ST HOSP IP/OBS MODERATE 55: CPT | Performed by: INTERNAL MEDICINE

## 2023-06-09 RX ORDER — VENLAFAXINE 37.5 MG/1
75 TABLET ORAL DAILY
COMMUNITY

## 2023-06-09 RX ORDER — CLONIDINE HYDROCHLORIDE 0.1 MG/1
0.1 TABLET ORAL EVERY 8 HOURS
Status: DISCONTINUED | OUTPATIENT
Start: 2023-06-09 | End: 2023-06-10

## 2023-06-09 RX ORDER — FLUMAZENIL 0.1 MG/ML
0.2 INJECTION, SOLUTION INTRAVENOUS
Status: DISCONTINUED | OUTPATIENT
Start: 2023-06-09 | End: 2023-06-10 | Stop reason: HOSPADM

## 2023-06-09 RX ORDER — ONDANSETRON 2 MG/ML
4 INJECTION INTRAMUSCULAR; INTRAVENOUS EVERY 6 HOURS PRN
Status: DISCONTINUED | OUTPATIENT
Start: 2023-06-09 | End: 2023-06-10 | Stop reason: HOSPADM

## 2023-06-09 RX ORDER — ACETAMINOPHEN 650 MG/1
650 SUPPOSITORY RECTAL EVERY 6 HOURS PRN
Status: DISCONTINUED | OUTPATIENT
Start: 2023-06-09 | End: 2023-06-10 | Stop reason: HOSPADM

## 2023-06-09 RX ORDER — HALOPERIDOL 5 MG/ML
2.5-5 INJECTION INTRAMUSCULAR EVERY 6 HOURS PRN
Status: DISCONTINUED | OUTPATIENT
Start: 2023-06-09 | End: 2023-06-10 | Stop reason: HOSPADM

## 2023-06-09 RX ORDER — FOLIC ACID 1 MG/1
1 TABLET ORAL DAILY
Status: DISCONTINUED | OUTPATIENT
Start: 2023-06-10 | End: 2023-06-10 | Stop reason: HOSPADM

## 2023-06-09 RX ORDER — LIDOCAINE 40 MG/G
CREAM TOPICAL
Status: DISCONTINUED | OUTPATIENT
Start: 2023-06-09 | End: 2023-06-10 | Stop reason: HOSPADM

## 2023-06-09 RX ORDER — MULTIPLE VITAMINS W/ MINERALS TAB 9MG-400MCG
1 TAB ORAL DAILY
Status: DISCONTINUED | OUTPATIENT
Start: 2023-06-10 | End: 2023-06-10 | Stop reason: HOSPADM

## 2023-06-09 RX ORDER — IPRATROPIUM BROMIDE 21 UG/1
2 SPRAY, METERED NASAL 2 TIMES DAILY PRN
COMMUNITY

## 2023-06-09 RX ORDER — DIAZEPAM 5 MG
10 TABLET ORAL EVERY 30 MIN PRN
Status: DISCONTINUED | OUTPATIENT
Start: 2023-06-09 | End: 2023-06-10 | Stop reason: HOSPADM

## 2023-06-09 RX ORDER — SODIUM CHLORIDE 9 MG/ML
INJECTION, SOLUTION INTRAVENOUS CONTINUOUS
Status: DISCONTINUED | OUTPATIENT
Start: 2023-06-09 | End: 2023-06-10 | Stop reason: ALTCHOICE

## 2023-06-09 RX ORDER — AMOXICILLIN 250 MG
1 CAPSULE ORAL 2 TIMES DAILY PRN
Status: DISCONTINUED | OUTPATIENT
Start: 2023-06-09 | End: 2023-06-10 | Stop reason: HOSPADM

## 2023-06-09 RX ORDER — OLANZAPINE 5 MG/1
5-10 TABLET, ORALLY DISINTEGRATING ORAL EVERY 6 HOURS PRN
Status: DISCONTINUED | OUTPATIENT
Start: 2023-06-09 | End: 2023-06-10 | Stop reason: HOSPADM

## 2023-06-09 RX ORDER — DEXTROSE MONOHYDRATE, SODIUM CHLORIDE, AND POTASSIUM CHLORIDE 50; 1.49; 9 G/1000ML; G/1000ML; G/1000ML
100 INJECTION, SOLUTION INTRAVENOUS CONTINUOUS
Status: DISCONTINUED | OUTPATIENT
Start: 2023-06-09 | End: 2023-06-10 | Stop reason: HOSPADM

## 2023-06-09 RX ORDER — METOPROLOL SUCCINATE 25 MG/1
25 TABLET, EXTENDED RELEASE ORAL DAILY
Status: DISCONTINUED | OUTPATIENT
Start: 2023-06-10 | End: 2023-06-10 | Stop reason: HOSPADM

## 2023-06-09 RX ORDER — DIAZEPAM 10 MG/2ML
5 INJECTION, SOLUTION INTRAMUSCULAR; INTRAVENOUS ONCE
Status: COMPLETED | OUTPATIENT
Start: 2023-06-09 | End: 2023-06-09

## 2023-06-09 RX ORDER — AMOXICILLIN 250 MG
2 CAPSULE ORAL 2 TIMES DAILY PRN
Status: DISCONTINUED | OUTPATIENT
Start: 2023-06-09 | End: 2023-06-10 | Stop reason: HOSPADM

## 2023-06-09 RX ORDER — ONDANSETRON 4 MG/1
4 TABLET, ORALLY DISINTEGRATING ORAL EVERY 6 HOURS PRN
Status: DISCONTINUED | OUTPATIENT
Start: 2023-06-09 | End: 2023-06-10 | Stop reason: HOSPADM

## 2023-06-09 RX ORDER — DIAZEPAM 10 MG/2ML
5-10 INJECTION, SOLUTION INTRAMUSCULAR; INTRAVENOUS EVERY 30 MIN PRN
Status: DISCONTINUED | OUTPATIENT
Start: 2023-06-09 | End: 2023-06-10 | Stop reason: HOSPADM

## 2023-06-09 RX ORDER — PANTOPRAZOLE SODIUM 40 MG/1
40 TABLET, DELAYED RELEASE ORAL
Status: DISCONTINUED | OUTPATIENT
Start: 2023-06-09 | End: 2023-06-10 | Stop reason: HOSPADM

## 2023-06-09 RX ORDER — VENLAFAXINE 75 MG/1
75 TABLET ORAL DAILY
Status: DISCONTINUED | OUTPATIENT
Start: 2023-06-10 | End: 2023-06-10 | Stop reason: HOSPADM

## 2023-06-09 RX ORDER — ONDANSETRON 2 MG/ML
4 INJECTION INTRAMUSCULAR; INTRAVENOUS EVERY 30 MIN PRN
Status: DISCONTINUED | OUTPATIENT
Start: 2023-06-09 | End: 2023-06-10

## 2023-06-09 RX ORDER — ACETAMINOPHEN 325 MG/1
650 TABLET ORAL EVERY 6 HOURS PRN
Status: DISCONTINUED | OUTPATIENT
Start: 2023-06-09 | End: 2023-06-10 | Stop reason: HOSPADM

## 2023-06-09 RX ORDER — DEXTROAMPHETAMINE SACCHARATE, AMPHETAMINE ASPARTATE, DEXTROAMPHETAMINE SULFATE AND AMPHETAMINE SULFATE 2.5; 2.5; 2.5; 2.5 MG/1; MG/1; MG/1; MG/1
10 TABLET ORAL 2 TIMES DAILY
COMMUNITY

## 2023-06-09 RX ORDER — DEXTROAMPHETAMINE SACCHARATE, AMPHETAMINE ASPARTATE, DEXTROAMPHETAMINE SULFATE AND AMPHETAMINE SULFATE 2.5; 2.5; 2.5; 2.5 MG/1; MG/1; MG/1; MG/1
10 TABLET ORAL 2 TIMES DAILY
Status: DISCONTINUED | OUTPATIENT
Start: 2023-06-09 | End: 2023-06-09

## 2023-06-09 RX ADMIN — CLONIDINE HYDROCHLORIDE 0.1 MG: 0.1 TABLET ORAL at 21:22

## 2023-06-09 RX ADMIN — FOLIC ACID: 5 INJECTION, SOLUTION INTRAMUSCULAR; INTRAVENOUS; SUBCUTANEOUS at 22:45

## 2023-06-09 RX ADMIN — PANTOPRAZOLE SODIUM 40 MG: 40 TABLET, DELAYED RELEASE ORAL at 21:22

## 2023-06-09 RX ADMIN — DIAZEPAM 5 MG: 5 INJECTION INTRAMUSCULAR; INTRAVENOUS at 17:35

## 2023-06-09 RX ADMIN — DIAZEPAM 5 MG: 5 INJECTION INTRAMUSCULAR; INTRAVENOUS at 19:06

## 2023-06-09 RX ADMIN — ONDANSETRON 4 MG: 2 INJECTION INTRAMUSCULAR; INTRAVENOUS at 17:35

## 2023-06-09 RX ADMIN — SODIUM CHLORIDE: 9 INJECTION, SOLUTION INTRAVENOUS at 19:07

## 2023-06-09 RX ADMIN — SODIUM CHLORIDE 1000 ML: 9 INJECTION, SOLUTION INTRAVENOUS at 17:34

## 2023-06-09 RX ADMIN — POTASSIUM CHLORIDE, DEXTROSE MONOHYDRATE AND SODIUM CHLORIDE 100 ML/HR: 150; 5; 900 INJECTION, SOLUTION INTRAVENOUS at 21:16

## 2023-06-09 RX ADMIN — AMOXICILLIN AND CLAVULANATE POTASSIUM 1 TABLET: 875; 125 TABLET, FILM COATED ORAL at 21:22

## 2023-06-09 ASSESSMENT — ACTIVITIES OF DAILY LIVING (ADL)
ADLS_ACUITY_SCORE: 35

## 2023-06-09 NOTE — ED TRIAGE NOTES
"Pt arrives via triage with partner after feeling \"woozy all day\" and states \"everytime I went to get up, I felt worse\". Pt presents with shakiness, nausea. Pt states it feels \"like an old school panic attack\". Pt states blood pressures at home were consistently over 150, as high as 180s PTA. Pt states they've gotten progressively higher over the week. Pt takes metoprolol at home BID and took both doses today.      "

## 2023-06-09 NOTE — ED PROVIDER NOTES
History     Chief Complaint:  Hypertension and Dizziness       The history is provided by the patient.      Megan Bettencourt is a 68 year old female who who presents with hypertension and dizziness. Earlier this morning, she got up feeling dizzy and had an episode of vomiting. She checked her blood pressure shortly afterwards and it was 198 systolic. This prompted her to talk with a triage line nurse, and she was advised to go to the ED. At bedside, her  notes that she has been tremulous all day which is unusual. Other symptoms mentioned include a headache, diaphoresis, or nausea. She drinks on a daily basis, and will usually have 1 bottle of wine a day. Her last drink was a couple days ago. Recent medication changes include being started on Metoprolol a week ago. She denies diarrhea, chest pain, double vision, or unilateral weakness.     Independent Historian:   the     Review of External Notes:   None     Medications:    Toprol XL  Remeron   Zofran    Oxycodone   Adderall     Past Medical History:    Anxiety   Cervical stenosis of spine   Thyroid disease   Seizure   Aortic dilation   HTN   ADD  CAMILA  Gilbert syndrome   Pure hypercholesteremia   Adjustment disorder    Past Surgical History:    Unspecified breast augmentation      B/L blepharoplasty   Neck lift   B/L wrist surgery   R heel fx surgery   B/L ptosis repair   R carpal tunnel release     Physical Exam     Patient Vitals for the past 24 hrs:   BP Temp Pulse Resp SpO2   23 1700 (!) 134/113 97  F (36.1  C) 87 -- 98 %   23 1653 (!) 178/111 -- 101 20 96 %        Physical Exam  VS: Reviewed per above  HENT: Mucous membranes moist, no nuchal rigidity  EYES: sclera anicteric  CV: Rate as noted, regular rhythm.   RESP: Effort normal. Breath sounds are normal bilaterally.  GI: no tenderness/rebound/guarding, not distended.  NEURO: GCS 15, cranial nerves II through XII are intact, 5 out of 5 strength in all 4 extremities,  sensation is intact light touch in all 4 extremities.  Normal finger-nose-finger testing bilaterally, normal heel shin testing bilaterally.  Tremulous.  MSK: No deformity of the extremities  SKIN: Warm and dry       Emergency Department Course   ECG  ECG taken at 1744, ECG read at 1744  Normal sinus rhythm   Low voltage QRS  Borderline ECG   Rate 79 bpm. CT interval 180 ms. QRS duration 88 ms. QT/QTc 406/465 ms. P-R-T axes 79 -18 41.     Imaging:  Head CT w/o contrast  Final Result  IMPRESSION:  1.  No acute intracranial process.     Report per radiology    Laboratory:  Labs Ordered and Resulted from Time of ED Arrival to Time of ED Departure   BASIC METABOLIC PANEL - Abnormal       Result Value    Sodium 137      Potassium 4.1      Chloride 96 (*)     Carbon Dioxide (CO2) 26      Anion Gap 15      Urea Nitrogen 12.1      Creatinine 0.57      Calcium 9.0      Glucose 186 (*)     GFR Estimate >90     HEPATIC FUNCTION PANEL - Abnormal    Protein Total 7.5      Albumin 4.5      Bilirubin Total 0.9      Alkaline Phosphatase 85      AST 63 (*)     ALT 48 (*)     Bilirubin Direct <0.20     TROPONIN T, HIGH SENSITIVITY - Normal    Troponin T, High Sensitivity 9     CBC WITH PLATELETS AND DIFFERENTIAL    WBC Count 4.6      RBC Count 4.40      Hemoglobin 13.9      Hematocrit 41.9      MCV 95      MCH 31.6      MCHC 33.2      RDW 14.0      Platelet Count 373      % Neutrophils 67      % Lymphocytes 19      % Monocytes 12      % Eosinophils 1      % Basophils 1      % Immature Granulocytes 0      NRBCs per 100 WBC 0      Absolute Neutrophils 3.0      Absolute Lymphocytes 0.9      Absolute Monocytes 0.6      Absolute Eosinophils 0.0      Absolute Basophils 0.1      Absolute Immature Granulocytes 0.0      Absolute NRBCs 0.0        Emergency Department Course & Assessments:    Interventions:  Medications   0.9% sodium chloride BOLUS (0 mLs Intravenous Stopped 6/9/23 1832)     Followed by   sodium chloride 0.9% infusion (has no  administration in time range)   ondansetron (ZOFRAN) injection 4 mg (4 mg Intravenous $Given 6/9/23 1735)   diazepam (VALIUM) injection 5 mg (has no administration in time range)   diazepam (VALIUM) injection 5 mg (5 mg Intravenous $Given 6/9/23 1735)      Assessments:  1517 I obtained history and examined the patient as noted above.  1834 I rechecked the patient and explained findings. I discussed plan for admission to the hospital.      Consultations/Discussion of Management or Tests:  1844 I spoke with Dr. Bowden from Hospital[facility], who accepts the patient for admission.    Social Determinants of Health affecting care:   None    Disposition:  The patient was admitted to the hospital under the care of Dr. Bowden.     Impression & Plan    Medical Decision Making:  Patient presents to the ER for evaluation of feeling lightheaded and dizzy, elevated blood pressure, nauseated, sweaty, shaky.  Vital signs notable for elevated blood pressure.  No focal neurodeficits.  She does appear to be tremulous.  CT head negative for acute pathology.  Basic labs show very mild transaminitis.  Patient does drink moderate to heavy amounts of alcohol regularly.  Last drink was over a day ago.  Concern for alcohol withdrawal.  After IV fluids, antiemetics, benzodiazepines, her symptoms were improved.  She was still somewhat tremulous and was agreeable to hospital admission for further treatment of presumed alcohol withdrawal.    Diagnosis:    ICD-10-CM    1. Alcohol withdrawal syndrome without complication (H)  F10.930       2. Hypertensive urgency  I16.0       3. Lightheaded  R42       4. Nonintractable headache, unspecified chronicity pattern, unspecified headache type  R51.9            Scribe Disclosure:  I, Serjio Gonzalez, am serving as a scribe at 6:22 PM on 6/9/2023 to document services personally performed by Al Gomez MD based on my observations and the provider's statements to me.   6/9/2023   Al Gomez,  Al Canales MD  06/09/23 1953

## 2023-06-10 VITALS
HEART RATE: 64 BPM | DIASTOLIC BLOOD PRESSURE: 70 MMHG | BODY MASS INDEX: 20.66 KG/M2 | SYSTOLIC BLOOD PRESSURE: 122 MMHG | TEMPERATURE: 98 F | OXYGEN SATURATION: 100 % | RESPIRATION RATE: 18 BRPM | WEIGHT: 131.9 LBS

## 2023-06-10 LAB
ANION GAP SERPL CALCULATED.3IONS-SCNC: 9 MMOL/L (ref 7–15)
BUN SERPL-MCNC: 7.3 MG/DL (ref 8–23)
CALCIUM SERPL-MCNC: 8.4 MG/DL (ref 8.8–10.2)
CHLORIDE SERPL-SCNC: 106 MMOL/L (ref 98–107)
CREAT SERPL-MCNC: 0.64 MG/DL (ref 0.51–0.95)
DEPRECATED HCO3 PLAS-SCNC: 26 MMOL/L (ref 22–29)
GFR SERPL CREATININE-BSD FRML MDRD: >90 ML/MIN/1.73M2
GLUCOSE SERPL-MCNC: 91 MG/DL (ref 70–99)
POTASSIUM SERPL-SCNC: 4.5 MMOL/L (ref 3.4–5.3)
SODIUM SERPL-SCNC: 141 MMOL/L (ref 136–145)

## 2023-06-10 PROCEDURE — 250N000013 HC RX MED GY IP 250 OP 250 PS 637: Performed by: INTERNAL MEDICINE

## 2023-06-10 PROCEDURE — 99239 HOSP IP/OBS DSCHRG MGMT >30: CPT | Performed by: INTERNAL MEDICINE

## 2023-06-10 PROCEDURE — 96361 HYDRATE IV INFUSION ADD-ON: CPT

## 2023-06-10 PROCEDURE — G0378 HOSPITAL OBSERVATION PER HR: HCPCS

## 2023-06-10 PROCEDURE — 258N000001 HC RX 258: Performed by: INTERNAL MEDICINE

## 2023-06-10 PROCEDURE — 82310 ASSAY OF CALCIUM: CPT | Performed by: INTERNAL MEDICINE

## 2023-06-10 PROCEDURE — 36415 COLL VENOUS BLD VENIPUNCTURE: CPT | Performed by: INTERNAL MEDICINE

## 2023-06-10 RX ADMIN — MULTIPLE VITAMINS W/ MINERALS TAB 1 TABLET: TAB at 08:42

## 2023-06-10 RX ADMIN — POTASSIUM CHLORIDE, DEXTROSE MONOHYDRATE AND SODIUM CHLORIDE 100 ML/HR: 150; 5; 900 INJECTION, SOLUTION INTRAVENOUS at 10:06

## 2023-06-10 RX ADMIN — CLONIDINE HYDROCHLORIDE 0.1 MG: 0.1 TABLET ORAL at 05:07

## 2023-06-10 RX ADMIN — VENLAFAXINE 75 MG: 75 TABLET ORAL at 08:42

## 2023-06-10 RX ADMIN — METOPROLOL SUCCINATE 25 MG: 25 TABLET, EXTENDED RELEASE ORAL at 08:42

## 2023-06-10 RX ADMIN — THIAMINE HCL TAB 100 MG 100 MG: 100 TAB at 08:42

## 2023-06-10 RX ADMIN — PANTOPRAZOLE SODIUM 40 MG: 40 TABLET, DELAYED RELEASE ORAL at 08:42

## 2023-06-10 RX ADMIN — FOLIC ACID 1 MG: 1 TABLET ORAL at 08:42

## 2023-06-10 RX ADMIN — AMOXICILLIN AND CLAVULANATE POTASSIUM 1 TABLET: 875; 125 TABLET, FILM COATED ORAL at 08:42

## 2023-06-10 ASSESSMENT — ACTIVITIES OF DAILY LIVING (ADL)
TOILETING_ISSUES: NO
ADLS_ACUITY_SCORE: 22
ADLS_ACUITY_SCORE: 20
WEAR_GLASSES_OR_BLIND: YES
ADLS_ACUITY_SCORE: 22
CHANGE_IN_FUNCTIONAL_STATUS_SINCE_ONSET_OF_CURRENT_ILLNESS/INJURY: NO
FALL_HISTORY_WITHIN_LAST_SIX_MONTHS: NO
ADLS_ACUITY_SCORE: 22
WALKING_OR_CLIMBING_STAIRS_DIFFICULTY: NO
ADLS_ACUITY_SCORE: 22
DIFFICULTY_EATING/SWALLOWING: NO
ADLS_ACUITY_SCORE: 22
CONCENTRATING,_REMEMBERING_OR_MAKING_DECISIONS_DIFFICULTY: NO
ADLS_ACUITY_SCORE: 20
DOING_ERRANDS_INDEPENDENTLY_DIFFICULTY: NO
DRESSING/BATHING_DIFFICULTY: NO

## 2023-06-10 NOTE — ED NOTES
[facility]  ED Nurse Handoff Report    ED Chief complaint: Hypertension and Dizziness      ED Diagnosis:   Final diagnoses:   Alcohol withdrawal syndrome without complication (H)   Hypertensive urgency   Lightheaded   Nonintractable headache, unspecified chronicity pattern, unspecified headache type       Code Status: To be addressed by admitting provider  Allergies:   Allergies   Allergen Reactions     Hydrocodone      Hydrocodone-Acetaminophen Itching     In higher doses     Morphine Hcl Itching       Patient Story:  Presented to ED with concerns of HTN and dizziness. SBP 190s at home with dizziness, headache and diaphoresis. Drinks 1 bottle of wine a day.     Focused Assessment:  A&Ox4. Denies sob, CP. Initial CIWA 14, IV Valium given. CIWA 1 at 2000. Independent with ambulation and cares. Calm, cooperative.     Labs Ordered and Resulted from Time of ED Arrival to Time of ED Departure   BASIC METABOLIC PANEL - Abnormal       Result Value    Sodium 137      Potassium 4.1      Chloride 96 (*)     Carbon Dioxide (CO2) 26      Anion Gap 15      Urea Nitrogen 12.1      Creatinine 0.57      Calcium 9.0      Glucose 186 (*)     GFR Estimate >90     HEPATIC FUNCTION PANEL - Abnormal    Protein Total 7.5      Albumin 4.5      Bilirubin Total 0.9      Alkaline Phosphatase 85      AST 63 (*)     ALT 48 (*)     Bilirubin Direct <0.20     TROPONIN T, HIGH SENSITIVITY - Normal    Troponin T, High Sensitivity 9     CBC WITH PLATELETS AND DIFFERENTIAL    WBC Count 4.6      RBC Count 4.40      Hemoglobin 13.9      Hematocrit 41.9      MCV 95      MCH 31.6      MCHC 33.2      RDW 14.0      Platelet Count 373      % Neutrophils 67      % Lymphocytes 19      % Monocytes 12      % Eosinophils 1      % Basophils 1      % Immature Granulocytes 0      NRBCs per 100 WBC 0      Absolute Neutrophils 3.0      Absolute Lymphocytes 0.9      Absolute Monocytes 0.6      Absolute Eosinophils 0.0      Absolute Basophils 0.1       Absolute Immature Granulocytes 0.0      Absolute NRBCs 0.0       Head CT w/o contrast   Final Result   IMPRESSION:   1.  No acute intracranial process.             Treatments and/or interventions provided: labs, fluids, CT head, CIWA protocol  Patient's response to treatments and/or interventions: tolerated    To be done/followed up on inpatient unit:  CIWA protocol     Does this patient have any cognitive concerns?: none    Activity level - Baseline/Home:  Independent  Activity Level - Current:   Independent    Patient's Preferred language: English   Needed?: No    Isolation: None  Infection: Not Applicable  Patient tested for COVID 19 prior to admission: NO  Bariatric?: No    Vital Signs:   Vitals:    06/09/23 1700 06/09/23 1855 06/09/23 1930 06/09/23 2000   BP: (!) 134/113 (!) 169/102 (!) 152/115 (!) 148/102   Pulse: 87 77     Resp:       Temp: 97  F (36.1  C)      SpO2: 98%  95%        Cardiac Rhythm:     Was the PSS-3 completed:   Yes  What interventions are required if any?               Family Comments:  at bedside  OBS brochure/video discussed/provided to patient/family: N/A            For the majority of the shift this patient's behavior was Green.   Behavioral interventions performed were na.    ED NURSE PHONE NUMBER: *[phone]

## 2023-06-10 NOTE — PLAN OF CARE
Goal Outcome Evaluation:      Plan of Care Reviewed With: patient    Overall Patient Progress: no change    DATE & TIME: 6/9/23 admission at [phone]   Cognitive Concerns/ Orientation : A&O x4, calm & cooperative. Slept all night  BEHAVIOR & AGGRESSION TOOL COLOR: Green  CIWA SCORE: 2 (tremors), 2 (tremors)  ABNL VS/O2: VSS on RA  MOBILITY: SBA. Falls risk. Generalized weakness   PAIN MANAGMENT: Denies  DIET: Regular   BOWEL/BLADDER: Continent  ABNL LAB/BG: NA  DRAIN/DEVICES: PIV infusing D5NS+20KCl @100mL/hr  SKIN: Psoriasis, R ankle wound from surgical site   TESTS/PROCEDURES: None  D/C DAY/GOALS/PLACE: Pending, 2-3 days   OTHER IMPORTANT INFO: BID Augmentin for ankle infection s/p hardware removal in March.

## 2023-06-10 NOTE — PROGRESS NOTES
RECEIVING UNIT ED HANDOFF REVIEW    ED Nurse Handoff Report was reviewed by: Kait Jain RN on June 9, 2023 at 11:25 PM

## 2023-06-10 NOTE — PLAN OF CARE
Goal Outcome Evaluation:      Discharge    Patient discharged to home via private car.  Care plan note:  patient has mild tremor but no other symptoms of alcohol withdrawal.  Tolerated breakfast and lunch.  Has demonstrated steady gait.  BP's much improved.  She denies HA or pain. She will make her follow up appointment with her PCP.  She states she will abstain from drinking wine.    Listed belongings gathered and given to patient (including from security/pharmacy). Yes, cell phone and , clothing, shoes, magazines  Care Plan and Patient education resolved: Yes  Prescriptions if needed, hard copies sent with patient  NA  Medication Bin checked and emptied on discharge Yes  SW/care coordinator/charge RN aware of discharge: Yes

## 2023-06-10 NOTE — UTILIZATION REVIEW
"  Admission Status; Secondary Review Determination         Under the authority of the Utilization Management Committee, the utilization review process indicated a secondary review on the above patient.  The review outcome is based on review of the medical records, discussions with staff, and applying clinical experience noted on the date of the review.          (x) Observation Status Appropriate - This patient does not meet hospital inpatient criteria and is placed in observation status. If this patient's primary payer is Medicare and was admitted as an inpatient, Condition Code 44 should be used and patient status changed to \"observation\".     RATIONALE FOR DETERMINATION   68-year-old female with a medical history of hypertension, anxiety with panic attacks, attention deficit disorder (ADD), hyperlipidemia, and alcohol use disorder was admitted on 6/9/2023 with symptoms of dizziness, nausea, vomiting, high blood pressure, and headache. The patient reported drinking two glasses of wine daily, with her last drink approximately 48 hours prior to admission. She exhibited signs consistent with alcohol withdrawal syndrome, which significantly improved after receiving 5 mg of IV diazepam on two occasions. No signs of infection or focal neurological deficits were observed, and the patient had no chest pain or shortness of breath. She was placed on an alcohol withdrawal protocol and received clonidine, diazepam, IV fluids, and essential vitamins (thiamine, folate, and multivitamins). Alcohol cessation was advised. Mildly elevated liver enzymes suggested the presence of mild alcoholic hepatitis, requiring monitoring. The patient also had essential hypertension, for which she was recently started on Toprol-XL.   Patient has no evidence of severe alcohol withdrawal  The severity of illness, intensity of service provided, expected LOS and risk for adverse outcome make the care appropriate for further observation; however, " doesn't meet criteria for hospital inpatient admission. Dr Bowden notified of this determination.    This document was produced using voice recognition software.      The information on this document is developed by the utilization review team in order for the business office to ensure compliance.  This only denotes the appropriateness of proper admission status and does not reflect the quality of care rendered.         The definitions of Inpatient Status and Observation Status used in making the determination above are those provided in the CMS Coverage Manual, Chapter 1 and Chapter 6, section 70.4.      Sincerely,     WHIT ALDANA MD    System Medical Director  Utilization Management  [facility].

## 2023-06-10 NOTE — PHARMACY-ADMISSION MEDICATION HISTORY
Pharmacist Admission Medication History    Admission medication history is complete. The information provided in this note is only as accurate as the sources available at the time of the update.    Medication reconciliation/reorder completed by provider prior to medication history? No    Information Source(s): Patient and CareEverywhere/SureScripts via in-person    Pertinent Information:   1. Recent antimicrobials:  On 5/30, patient was prescribed Augmentin for foot infection with instructions of Take 1 tablet 2 times daily for 7 days. Patient reported she still has 3 days left before completing the course of antibiotics (due to be completed 6/12).   2. Patient reported she took 2 tablets of metoprolol and 4 tablets of venlafaxine today (double her prescribed dose because she was anxious and had high blood pressure).  3. Patient reported she does take Adderall. Last fill was 3/30 for 60 tabs.    Changes made to PTA medication list:    Added: Adderall, ipratropium, augmentin, venlafaxine    Deleted: Zofran, mirtazapine    Changed: APAP to PRN    Medication Affordability:  Not including over the counter (OTC) medications, was there a time in the past 3 months when you did not take your medications as prescribed because of cost?: No    Allergies reviewed with patient and updates made in EHR: yes    Medication History Completed By: May Hoang RPH 6/9/2023 7:49 PM    Prior to Admission medications    Medication Sig Last Dose Taking? Auth Provider Long Term End Date   acetaminophen (TYLENOL) 325 MG tablet Take 650 mg by mouth 3 times daily as needed for pain Unknown at PRN Yes Reported, Patient     amoxicillin-clavulanate (AUGMENTIN) 875-125 MG tablet Take 1 tablet by mouth 2 times daily For 7 days 6/9/2023 at AM Yes Unknown, Entered By History  6/12/23   amphetamine-dextroamphetamine (ADDERALL) 10 MG tablet Take 10 mg by mouth 2 times daily Past Month Yes Unknown, Entered By History No    ipratropium (ATROVENT) 0.03 %  nasal spray Spray 2 sprays into both nostrils 2 times daily as needed for rhinitis 6/9/2023 at AM Yes Unknown, Entered By History     metoprolol succinate ER (TOPROL XL) 25 MG 24 hr tablet Take 1 tablet (25 mg) by mouth daily for 30 days 6/9/2023 at AM (took 2 tabs Yes Mookie Joe MD Yes 6/29/23   venlafaxine (EFFEXOR) 37.5 MG tablet Take 75 mg by mouth daily 6/9/2023 at AM Yes Unknown, Entered By History No

## 2023-06-10 NOTE — H&P
[facility]    History and Physical - Hospitalist Service       Date of Admission:  6/9/2023    Assessment & Plan        Megan Bettencourt is a 68 year old female with hypertension, anxiety with panic attacks, ADD, hyperlipidemia, alcohol use disorder, admitted on 6/9/2023 with dizziness, nausea, vomiting and high blood pressure, headache, likely alcoholic gastritis and alcohol withdrawal.    Uncomplicated alcohol withdrawal syndrome  Acute gastritis, likely alcohol induced  Alcohol use disorder  Headache  -Drinks 2 glasses of wine every day.  Last drink was about 48 hours ago  -Presented with dizziness, nausea, vomiting, elevated blood pressure, tachycardia  -Symptoms significantly improved after 5 mg IV diazepam x2.  -No signs and symptoms of infection, no focal neurological deficits no chest pain or shortness of breath  -Monitor on alcohol withdrawal protocol  -Administer clonidine  -Diazepam for withdrawal symptoms  -IV fluids  -Administer thiamine, folate, multivitamins  Alcohol cessation was advised    Mild alcoholic hepatitis  -Has mildly elevated ALT and AST  -Monitor    Essential hypertension  -Was started on Toprol-XL about a week ago.  Blood pressure severely elevated, 178/111 on arrival.  This has improved to 169/102 after IV diazepam administration  -Elevated blood pressure likely combination of essential hypertension and now alcohol withdrawal  -Continue Toprol-XL once meds are reconciled  -Continue clonidine as per alcohol withdrawal protocol    Dizziness-due to hypovolemia  Mild hypovolemia  -Has received 1 L normal saline bolus  -Start on D5 NS with 20 mg KCl at 100 ml/h    Generalized anxiety disorder with panic attacks  ADD    S/p right ankle hardware removal 3/2023, [facility]  Right ankle wound infection  - Improved with Augmentin.  Still has moderate drainage       Diet:  Regular diet  DVT Prophylaxis: Pneumatic Compression Devices  Kolb Catheter: Not present  Lines: None      Cardiac Monitoring: None  Code Status:  Full code    Clinically Significant Risk Factors Present on Admission                  # Hypertension: Noted on problem list               Disposition Plan      Expected Discharge Date: 06/11/2023                  Nadeen Bowden MD  Hospitalist Service  [facility]  Securely message with [facility] [facility] (more info)  Text page via [facility] Paging/Directory     ______________________________________________________________________    Chief Complaint     High blood pressure, dizziness, nausea, vomiting    History is obtained from the patient    History of Present Illness   Megan Bettencourt is a 68 year old female with hypertension, anxiety with panic attacks, ADD, hyperlipidemia, alcohol use disorder who presents on 6/9/2023 with dizziness, nausea, vomiting and high blood pressure.    Patient reports her blood pressure was high this morning with systolic in 170s and 180s.  She was started on Toprol XL about a week ago for hypertension.  Since this morning she has been feeling dizzy and lightheaded especially when she tries to get up and walk.  She has been nauseated and had emesis.  She denies any fever or chills, no abdominal pain, no diarrhea, no chest pain or shortness of breath.  She had right ankle hardware removal on 5/3/2023 and subsequently had wound infection treated with antibiotics.  She denies any increased pain in her right leg.  The wound is healing well.    On arrival to ER, she was hypertensive with blood pressure 178/111, tachycardic with heart rate 101, oxygen saturation 96% on room air.    Labs showed mildly elevated transaminases, normal troponin, normal electrolytes, normal renal function, no leukocytosis.  EKG showed sinus rhythm. Head CT showed no acute intracranial process.    She was administered 1 L IV normal saline bolus, IV diazepam 5 mg x 2 her symptoms significantly improved after this.  Dizziness has improved, nausea and anxiety have  significantly improved.    Reports she drinks 2 glasses of wine every day.  Her last drink was about 48 hours ago.   denies any hallucinations, denies any previous alcohol withdrawal symptoms.    She was felt to have alcohol withdrawal      Past Medical History    Past Medical History:   Diagnosis Date     Anxiety      Cervical stenosis of spine      Pelvic fracture (H)      Thyroid disease      Vision disturbance        Past Surgical History   Past Surgical History:   Procedure Laterality Date     BREAST SURGERY      augmentation      SECTION       COSMETIC BLEPHAROPLASTY LOWER LIDS BILATERAL  2014    Procedure: COSMETIC BLEPHAROPLASTY LOWER LIDS BILATERAL;  Surgeon: Loi Mcpherson MD;  Location: [facility]     COSMETIC SURGERY      neck lift     ORTHOPEDIC SURGERY  2017    bilateral wrists and right heel fracture     REPAIR PTOSIS BILATERAL  2014    Procedure: REPAIR PTOSIS BILATERAL;  Surgeon: Loi Mcpherson MD;  Location: [facility]       Prior to Admission Medications   Prior to Admission Medications   Prescriptions Last Dose Informant Patient Reported? Taking?   acetaminophen (TYLENOL) 325 MG tablet   Yes No   Sig: Take 650 mg by mouth 3 times daily   metoprolol succinate ER (TOPROL XL) 25 MG 24 hr tablet   No No   Sig: Take 1 tablet (25 mg) by mouth daily for 30 days   mirtazapine (REMERON) 15 MG tablet   No No   Sig: Take 1 tablet (15 mg) by mouth At Bedtime   ondansetron (ZOFRAN) 4 MG tablet   No No   Sig: Take 1 tablet (4 mg) by mouth every 6 hours as needed for nausea      Facility-Administered Medications: None           Physical Exam   Vital Signs: Temp: 97  F (36.1  C)   BP: (!) 169/102 Pulse: 77   Resp: 20 SpO2: 98 % O2 Device: None (Room air)    Weight: 0 lbs 0 oz    Constitutional - alert, resting in bed, appears comfortable  Head - normocephalic, atraumatic  ENT - normal eye lids and lashes, no conjunctival hyperemia, no icterus, extraocular movements are normal, normal  nose, no discharge, moist oral mucosa, no ulcers or exudates, normal external ear  Neck - no thyromegaly or lymphadenopathy. Tracheal is midline  CV - regular rate and rhythm, no murmurs, no edema  Pulmonary - lungs are clear to auscultation bilaterally, no wheezing or rhonchi  GI - abdomen is soft, non distended, non tender, bowel sounds are present, no organomegaly  Neurological - alert and oriented, normal speech, no focal deficits  Musculoskeletal - no joint erythema or swelling, ROM is ok            Medical Decision Making       65 MINUTES SPENT BY ME on the date of service doing chart review, history, exam, documentation & further activities per the note.      Data     I have personally reviewed the following data over the past 24 hrs:    4.6  \   13.9   / 373     137 96 (L) 12.1 /  186 (H)   4.1 26 0.57 \       ALT: 48 (H) AST: 63 (H) AP: 85 TBILI: 0.9   ALB: 4.5 TOT PROTEIN: 7.5 LIPASE: N/A       Trop: 9 BNP: N/A       Imaging results reviewed over the past 24 hrs:   Recent Results (from the past 24 hour(s))   Head CT w/o contrast    Narrative    EXAM: CT HEAD W/O CONTRAST  LOCATION: [facility]  DATE/TIME: 6/9/2023 6:03 PM CDT    INDICATION: headache, dizzy  COMPARISON: 12/30/2019  TECHNIQUE: Routine CT Head without IV contrast. Multiplanar reformats. Dose reduction techniques were used.    FINDINGS:  INTRACRANIAL CONTENTS: No intracranial hemorrhage, extraaxial collection, or mass effect.  No CT evidence of acute infarct. Mild presumed chronic small vessel ischemic changes. Mild generalized volume loss. No hydrocephalus.     VISUALIZED ORBITS/SINUSES/MASTOIDS: No intraorbital abnormality. No paranasal sinus mucosal disease. No middle ear or mastoid effusion.    BONES/SOFT TISSUES: No acute abnormality.      Impression    IMPRESSION:  1.  No acute intracranial process.

## 2023-06-10 NOTE — DISCHARGE SUMMARY
[facility]  Hospitalist Discharge Summary      Date of Admission:  6/9/2023  Date of Discharge:  6/10/2023  Discharging Provider: Nadeen Bowden MD  Discharge Service: Hospitalist Service    Discharge diagnoses and Hospital course    Megan Bettencourt is a 68 year old female with hypertension, anxiety with panic attacks, ADD, hyperlipidemia, alcohol use disorder, who was admitted on 6/9/2023 with dizziness, nausea, vomiting, high blood pressure and headache.  Though not entirely clear, her symptoms significantly improved after she received IV diazepam.  She was felt to have mild alcohol withdrawal, dehydration.  She was monitored overnight and received IV fluids.  She was discharged on 6/10/2023.  Alcohol cessation was advised.       Uncomplicated alcohol withdrawal syndrome-resolved  Acute gastritis, likely alcohol induced-resolved  Alcohol use disorder-cessation advised  Headache-resolved  -Drinks 2 glasses of wine every day.  Last drink was about 48 hours before presentation.    -Presented with dizziness, nausea, vomiting, elevated blood pressure, tachycardia  -Symptoms significantly improved after 5 mg IV diazepam x2.  -No signs and symptoms of infection, no focal neurological deficits, no chest pain or shortness of breath  -Felt to have mild alcohol withdrawal and dehydration that resolved with diazepam and IV fluids.      Mild alcoholic hepatitis  -mildly elevated ALT and AST     Essential hypertension  -Was started on Toprol-XL about a week ago.  Blood pressure severely elevated, 178/111 on arrival.  This has improved to 169/102 after IV diazepam administration  -Elevated blood pressure likely combination of essential hypertension and now alcohol withdrawal  - Toprol-XL  25 mg daily was continued.  She received clonidine per alcohol withdrawal protocol.  By time of discharge on 6/10, blood pressure was well controlled with systolic in 120s.    -No medication changes made.  Recommend  to continue Toprol-XL 25 mg daily and follow-up for hypertension management        Dizziness-due to hypovolemia-resolved  Mild hypovolemia  --Hypovolemia and dizziness resolved with IV fluids      Generalized anxiety disorder with panic attacks  ADD  -Continue venlafaxine and Adderall     S/p right ankle hardware removal 3/2023, [facility]  Right ankle wound infection  - Improved with Augmentin.  Still has mild drainage  -On Augmentin, continue        Clinically Significant Risk Factors          Follow-ups Needed After Discharge   Follow-up Appointments     Follow-up and recommended labs and tests       Follow up with primary care provider, Gunnar Sarkar, within 7   days for hospital follow- up.  No follow up labs or test are needed.         {Additional follow-up instructions/to-do's for PCP    :    Unresulted Labs Ordered in the Past 30 Days of this Admission     No orders found for last 31 day(s).          Discharge Disposition   Discharged to home  Condition at discharge: Stable        Consultations This Hospital Stay   None    Code Status   Full Code    Time Spent on this Encounter   I, Nadeen Bowden MD, personally saw the patient today and spent greater than 30 minutes discharging this patient.       Nadeen Bowden MD  [place] [address] [facility]  [address] [place] [place] [address]  Phone: [phone]  ______________________________________________________________________    Physical Exam   Vital Signs: Temp: 98  F (36.7  C) Temp src: Oral BP: 122/70 Pulse: 64   Resp: 18 SpO2: 100 % O2 Device: None (Room air)    Weight: 131 lbs 14.4 oz    Constitutional - awake and alert, resting in bed, in no acute distress  Cardiovascular - regular rate and rhythm, no murmurs, no edema  Pulmonary - lungs are clear to auscultation bilaterally, no wheezing or rhonchi  GI - abdomen is soft, nontender, nondistended, no hepatosplenomegaly or masses  Integumentary - skin is warm and dry, no rashes  or ulcers  Neurological - awake, alert and oriented x3.  Moving all 4 extremities, normal speech, no focal deficits       Primary Care Physician   Gunnar Sarkar    Discharge Orders      Reason for your hospital stay    Headache, nausea, vomiting, dizziness, high blood pressure.     Follow-up and recommended labs and tests     Follow up with primary care provider, Gunnar Sarkar, within 7 days for hospital follow- up.  No follow up labs or test are needed.     Activity    Your activity upon discharge: activity as tolerated     Diet    Follow this diet upon discharge: Regular       Significant Results and Procedures   Results for orders placed or performed during the hospital encounter of 06/09/23   Head CT w/o contrast    Narrative    EXAM: CT HEAD W/O CONTRAST  LOCATION: [facility]  DATE/TIME: 6/9/2023 6:03 PM CDT    INDICATION: headache, dizzy  COMPARISON: 12/30/2019  TECHNIQUE: Routine CT Head without IV contrast. Multiplanar reformats. Dose reduction techniques were used.    FINDINGS:  INTRACRANIAL CONTENTS: No intracranial hemorrhage, extraaxial collection, or mass effect.  No CT evidence of acute infarct. Mild presumed chronic small vessel ischemic changes. Mild generalized volume loss. No hydrocephalus.     VISUALIZED ORBITS/SINUSES/MASTOIDS: No intraorbital abnormality. No paranasal sinus mucosal disease. No middle ear or mastoid effusion.    BONES/SOFT TISSUES: No acute abnormality.      Impression    IMPRESSION:  1.  No acute intracranial process.           Discharge Medications   Current Discharge Medication List      CONTINUE these medications which have NOT CHANGED    Details   acetaminophen (TYLENOL) 325 MG tablet Take 650 mg by mouth 3 times daily as needed for pain      amoxicillin-clavulanate (AUGMENTIN) 875-125 MG tablet Take 1 tablet by mouth 2 times daily For 7 days      amphetamine-dextroamphetamine (ADDERALL) 10 MG tablet Take 10 mg by mouth 2 times  daily      ipratropium (ATROVENT) 0.03 % nasal spray Spray 2 sprays into both nostrils 2 times daily as needed for rhinitis      metoprolol succinate ER (TOPROL XL) 25 MG 24 hr tablet Take 1 tablet (25 mg) by mouth daily for 30 days  Qty: 30 tablet, Refills: 0      venlafaxine (EFFEXOR) 37.5 MG tablet Take 75 mg by mouth daily           Allergies   Allergies   Allergen Reactions     Hydrocodone      Hydrocodone-Acetaminophen Itching     In higher doses     Morphine Hcl Itching

## 2023-06-13 ENCOUNTER — PATIENT OUTREACH (OUTPATIENT)
Dept: CARE COORDINATION | Facility: CLINIC | Age: 68
End: 2023-06-13
Payer: COMMERCIAL

## 2023-06-13 NOTE — PROGRESS NOTES
Connected Care Resource Center Contact  [facility]/Voicemail     Clinical Data: Transitional Care Management Outreach     Outreach attempted x 2.  Left message on patient's voicemail, providing [facility]'s 24/7 scheduling and nurse triage phone number [phone] ([phone]) for questions/concerns and/or to schedule an appt with an [facility] provider, if they do not have a PCP.      Plan:  [facility] will do no further outreaches at this time.           DAYANNA Christianson  Connected Care Resource [place], [facility]    *Connected Care Resource Team does NOT follow patient ongoing. Referrals are identified based on internal discharge reports and the outreach is to ensure patient has an understanding of their discharge instructions.

## 2023-06-19 ENCOUNTER — HOSPITAL ENCOUNTER (EMERGENCY)
Facility: CLINIC | Age: 68
Discharge: HOME | End: 2023-06-19
Attending: EMERGENCY MEDICINE | Admitting: EMERGENCY MEDICINE
Payer: COMMERCIAL

## 2023-06-19 VITALS
SYSTOLIC BLOOD PRESSURE: 160 MMHG | TEMPERATURE: 97.7 F | OXYGEN SATURATION: 99 % | HEIGHT: 68 IN | DIASTOLIC BLOOD PRESSURE: 99 MMHG | RESPIRATION RATE: 12 BRPM | WEIGHT: 130 LBS | BODY MASS INDEX: 19.7 KG/M2 | HEART RATE: 80 BPM

## 2023-06-19 DIAGNOSIS — I10 ESSENTIAL HYPERTENSION: ICD-10-CM

## 2023-06-19 DIAGNOSIS — R25.1 TREMOR: ICD-10-CM

## 2023-06-19 LAB
ALBUMIN SERPL BCG-MCNC: 4.4 G/DL (ref 3.5–5.2)
ALP SERPL-CCNC: 90 U/L (ref 35–104)
ALT SERPL W P-5'-P-CCNC: 40 U/L (ref 0–50)
ANION GAP SERPL CALCULATED.3IONS-SCNC: 15 MMOL/L (ref 7–15)
AST SERPL W P-5'-P-CCNC: 57 U/L (ref 0–45)
ATRIAL RATE - MUSE: 99 BPM
BASOPHILS # BLD AUTO: 0 10E3/UL (ref 0–0.2)
BASOPHILS NFR BLD AUTO: 1 %
BILIRUB SERPL-MCNC: 0.9 MG/DL
BUN SERPL-MCNC: 10.4 MG/DL (ref 8–23)
CALCIUM SERPL-MCNC: 8.8 MG/DL (ref 8.8–10.2)
CHLORIDE SERPL-SCNC: 98 MMOL/L (ref 98–107)
CREAT SERPL-MCNC: 0.55 MG/DL (ref 0.51–0.95)
DEPRECATED HCO3 PLAS-SCNC: 27 MMOL/L (ref 22–29)
DIASTOLIC BLOOD PRESSURE - MUSE: NORMAL MMHG
EOSINOPHIL # BLD AUTO: 0 10E3/UL (ref 0–0.7)
EOSINOPHIL NFR BLD AUTO: 0 %
ERYTHROCYTE [DISTWIDTH] IN BLOOD BY AUTOMATED COUNT: 13.9 % (ref 10–15)
ETHANOL SERPL-MCNC: <0.01 G/DL
GFR SERPL CREATININE-BSD FRML MDRD: >90 ML/MIN/1.73M2
GLUCOSE SERPL-MCNC: 110 MG/DL (ref 70–99)
HCT VFR BLD AUTO: 43 % (ref 35–47)
HGB BLD-MCNC: 14.3 G/DL (ref 11.7–15.7)
IMM GRANULOCYTES # BLD: 0 10E3/UL
IMM GRANULOCYTES NFR BLD: 1 %
INTERPRETATION ECG - MUSE: NORMAL
LIPASE SERPL-CCNC: 39 U/L (ref 13–60)
LYMPHOCYTES # BLD AUTO: 1 10E3/UL (ref 0.8–5.3)
LYMPHOCYTES NFR BLD AUTO: 23 %
MCH RBC QN AUTO: 32.2 PG (ref 26.5–33)
MCHC RBC AUTO-ENTMCNC: 33.3 G/DL (ref 31.5–36.5)
MCV RBC AUTO: 97 FL (ref 78–100)
MONOCYTES # BLD AUTO: 0.8 10E3/UL (ref 0–1.3)
MONOCYTES NFR BLD AUTO: 17 %
NEUTROPHILS # BLD AUTO: 2.6 10E3/UL (ref 1.6–8.3)
NEUTROPHILS NFR BLD AUTO: 58 %
NRBC # BLD AUTO: 0 10E3/UL
NRBC BLD AUTO-RTO: 0 /100
P AXIS - MUSE: 75 DEGREES
PLATELET # BLD AUTO: 351 10E3/UL (ref 150–450)
POTASSIUM SERPL-SCNC: 3.7 MMOL/L (ref 3.4–5.3)
PR INTERVAL - MUSE: 168 MS
PROT SERPL-MCNC: 7.5 G/DL (ref 6.4–8.3)
QRS DURATION - MUSE: 90 MS
QT - MUSE: 346 MS
QTC - MUSE: 444 MS
R AXIS - MUSE: -57 DEGREES
RBC # BLD AUTO: 4.44 10E6/UL (ref 3.8–5.2)
SODIUM SERPL-SCNC: 140 MMOL/L (ref 136–145)
SYSTOLIC BLOOD PRESSURE - MUSE: NORMAL MMHG
T AXIS - MUSE: 73 DEGREES
TROPONIN T SERPL HS-MCNC: 11 NG/L
VENTRICULAR RATE- MUSE: 99 BPM
WBC # BLD AUTO: 4.5 10E3/UL (ref 4–11)

## 2023-06-19 PROCEDURE — 85025 COMPLETE CBC W/AUTO DIFF WBC: CPT | Performed by: EMERGENCY MEDICINE

## 2023-06-19 PROCEDURE — 99284 EMERGENCY DEPT VISIT MOD MDM: CPT | Mod: 25

## 2023-06-19 PROCEDURE — 96374 THER/PROPH/DIAG INJ IV PUSH: CPT

## 2023-06-19 PROCEDURE — 84484 ASSAY OF TROPONIN QUANT: CPT | Performed by: EMERGENCY MEDICINE

## 2023-06-19 PROCEDURE — 250N000013 HC RX MED GY IP 250 OP 250 PS 637: Performed by: EMERGENCY MEDICINE

## 2023-06-19 PROCEDURE — 82077 ASSAY SPEC XCP UR&BREATH IA: CPT | Performed by: EMERGENCY MEDICINE

## 2023-06-19 PROCEDURE — 96375 TX/PRO/DX INJ NEW DRUG ADDON: CPT

## 2023-06-19 PROCEDURE — 36415 COLL VENOUS BLD VENIPUNCTURE: CPT | Performed by: EMERGENCY MEDICINE

## 2023-06-19 PROCEDURE — 93005 ELECTROCARDIOGRAM TRACING: CPT

## 2023-06-19 PROCEDURE — 80053 COMPREHEN METABOLIC PANEL: CPT | Performed by: EMERGENCY MEDICINE

## 2023-06-19 PROCEDURE — 250N000011 HC RX IP 250 OP 636: Performed by: EMERGENCY MEDICINE

## 2023-06-19 PROCEDURE — 83690 ASSAY OF LIPASE: CPT | Performed by: EMERGENCY MEDICINE

## 2023-06-19 RX ORDER — LORAZEPAM 2 MG/ML
0.5 INJECTION INTRAMUSCULAR ONCE
Status: COMPLETED | OUTPATIENT
Start: 2023-06-19 | End: 2023-06-19

## 2023-06-19 RX ORDER — ONDANSETRON 2 MG/ML
4 INJECTION INTRAMUSCULAR; INTRAVENOUS ONCE
Status: COMPLETED | OUTPATIENT
Start: 2023-06-19 | End: 2023-06-19

## 2023-06-19 RX ORDER — DIAZEPAM 5 MG
5 TABLET ORAL EVERY 6 HOURS PRN
Qty: 8 TABLET | Refills: 0 | Status: SHIPPED | OUTPATIENT
Start: 2023-06-19

## 2023-06-19 RX ORDER — METOPROLOL SUCCINATE 25 MG/1
25 TABLET, EXTENDED RELEASE ORAL ONCE
Status: COMPLETED | OUTPATIENT
Start: 2023-06-19 | End: 2023-06-19

## 2023-06-19 RX ADMIN — ONDANSETRON 4 MG: 2 INJECTION INTRAMUSCULAR; INTRAVENOUS at 13:03

## 2023-06-19 RX ADMIN — METOPROLOL SUCCINATE 25 MG: 25 TABLET, EXTENDED RELEASE ORAL at 13:18

## 2023-06-19 RX ADMIN — LORAZEPAM 0.5 MG: 2 INJECTION INTRAMUSCULAR; INTRAVENOUS at 13:02

## 2023-06-19 ASSESSMENT — ACTIVITIES OF DAILY LIVING (ADL): ADLS_ACUITY_SCORE: 35

## 2023-06-19 NOTE — ED NOTES
Checked blood pressure 10x this morning, up to 200 systolic. Nausea and headache. Sweating and shaky

## 2023-06-19 NOTE — ED TRIAGE NOTES
Patient presents to ED with high blood pressure, states SBP was over 200 this AM. Also reporting neck stiffness and shakiness.Was here for same symptoms 10 days ago and received ativan which she states helped a lot. Denies CP & SOB.

## 2023-06-19 NOTE — DISCHARGE INSTRUCTIONS
Stop your blood pressure is improved with time.  Please take your medications.  We have no lab test abnormalities.  Please follow-up with your regular doctor as a long-term treatment of your blood pressures between you and your primary care physician.  Return with chest pain shortness of breath or other concerns.  Please continue to avoid alcohol.

## 2023-06-19 NOTE — ED PROVIDER NOTES
"  History     Chief Complaint:  Hypertension       HPI   Megan Bettencourt is a 68 year old female who presents with shakiness and concerned about her blood pressure.  Patient is a 68-year-old female who was recently hospitalized.  Diagnosis look to be alcohol withdrawal.  When asked when her last drink was patient states \"I do not know\".  Patient is not does on taking a antihypertensive but did not take it today.  Patient's had nausea and vomiting is felt shaky and tremulous she has had some vague jaw pain.  She has had a mild headache.  She was concerned she was going to \"stroke out\".  Presents to the emergency room for assessment.    Independent Historian:   None - Patient Only    Review of External Notes:   Recent hospitalization.      Medications:    acetaminophen (TYLENOL) 325 MG tablet  amphetamine-dextroamphetamine (ADDERALL) 10 MG tablet  ipratropium (ATROVENT) 0.03 % nasal spray  metoprolol succinate ER (TOPROL XL) 25 MG 24 hr tablet  venlafaxine (EFFEXOR) 37.5 MG tablet        Past Medical History:    Past Medical History:   Diagnosis Date     Anxiety      Cervical stenosis of spine      Pelvic fracture (H)      Thyroid disease      Vision disturbance        Past Surgical History:    Past Surgical History:   Procedure Laterality Date     BREAST SURGERY      augmentation      SECTION       COSMETIC BLEPHAROPLASTY LOWER LIDS BILATERAL  2014    Procedure: COSMETIC BLEPHAROPLASTY LOWER LIDS BILATERAL;  Surgeon: Loi Mcpherson MD;  Location: [facility]     COSMETIC SURGERY      neck lift     ORTHOPEDIC SURGERY  2017    bilateral wrists and right heel fracture     REPAIR PTOSIS BILATERAL  2014    Procedure: REPAIR PTOSIS BILATERAL;  Surgeon: Loi Mcpherson MD;  Location: [facility]        Physical Exam     Patient Vitals for the past 24 hrs:   BP Temp Temp src Pulse Resp SpO2 Height Weight   23 1116 (!) 170/92 97.7  F (36.5  C) Temporal 90 12 99 % 1.727 m (5' 8\") 59 kg (130 lb)    "     Physical Exam  Vitals reviewed.   HENT:      Head: Normocephalic.   Cardiovascular:      Rate and Rhythm: Normal rate.   Pulmonary:      Effort: Pulmonary effort is normal.   Abdominal:      General: Abdomen is flat.   Skin:     General: Skin is warm.      Capillary Refill: Capillary refill takes less than 2 seconds.      Coloration: Skin is not jaundiced.   Neurological:      General: No focal deficit present.      Mental Status: She is alert and oriented to person, place, and time. Mental status is at baseline.   Psychiatric:         Mood and Affect: Mood normal.      Comments: Seems anxious tremulous at times but seems to go away with intention.  Denies active drinking.         Emergency Department Course   ECG  ECG taken at 1254, ECG read at 1300    Rate 99 bpm. MS interval 168 ms. QRS duration 90 ms. QT/QTc 346/444 ms. normal sinus rhythm with occasional interval PACs.  No ST or T wave abnormalities.      Laboratory:  Labs Ordered and Resulted from Time of ED Arrival to Time of ED Departure   COMPREHENSIVE METABOLIC PANEL - Abnormal       Result Value    Sodium 140      Potassium 3.7      Chloride 98      Carbon Dioxide (CO2) 27      Anion Gap 15      Urea Nitrogen 10.4      Creatinine 0.55      Calcium 8.8      Glucose 110 (*)     Alkaline Phosphatase 90      AST 57 (*)     ALT 40      Protein Total 7.5      Albumin 4.4      Bilirubin Total 0.9      GFR Estimate >90     LIPASE - Normal    Lipase 39     TROPONIN T, HIGH SENSITIVITY - Normal    Troponin T, High Sensitivity 11     ETHYL ALCOHOL LEVEL - Normal    Alcohol ethyl <0.01     CBC WITH PLATELETS AND DIFFERENTIAL    WBC Count 4.5      RBC Count 4.44      Hemoglobin 14.3      Hematocrit 43.0      MCV 97      MCH 32.2      MCHC 33.3      RDW 13.9      Platelet Count 351      % Neutrophils 58      % Lymphocytes 23      % Monocytes 17      % Eosinophils 0      % Basophils 1      % Immature Granulocytes 1      NRBCs per 100 WBC 0      Absolute Neutrophils  2.6      Absolute Lymphocytes 1.0      Absolute Monocytes 0.8      Absolute Eosinophils 0.0      Absolute Basophils 0.0      Absolute Immature Granulocytes 0.0      Absolute NRBCs 0.0          Emergency Department Course & Assessments:             Interventions:  Medications   ondansetron (ZOFRAN) injection 4 mg (has no administration in time range)   LORazepam (ATIVAN) injection 0.5 mg (has no administration in time range)   metoprolol succinate ER (TOPROL XL) 24 hr tablet 25 mg (has no administration in time range)        Assessments:      Independent Interpretation (X-rays, CTs, rhythm strip):  None    Consultations/Discussion of Management or Tests:  None        Social Determinants of Health affecting care:   None    Disposition:  The patient was discharged to home.     Impression & Plan      Medical Decision Making:  Patient presents with tremulousness and concerns for elevated blood pressure.  Patient is awake and alert with a GCS of 15.  Has a history of recent admission to the hospital.  Pain admission diagnosis was alcohol withdrawal and alcohol-related gastritis.  Patient is well-appearing without new complaints repeat lab pressure checked and unremarkable given a low-dose of benzodiazepine as well as her home blood pressure medications numbers are not critical suspect underlying hypertension in the setting of alcohol withdrawal or anxiety.  Offered follow-up with primary care but no need for admission was identified and was discharged home in stable condition.    Critical Care time:  was 0 minutes for this patient excluding procedures.    Diagnosis:    ICD-10-CM    1. Tremor  R25.1       2. Essential hypertension  I10            Discharge Medications:  Discharge Medication List as of 6/19/2023  2:09 PM      START taking these medications    Details   diazepam (VALIUM) 5 MG tablet Take 1 tablet (5 mg) by mouth every 6 hours as needed for anxiety or sleep, Disp-8 tablet, R-0, Local Print                 Stephane Johnson MD  6/19/2023   Stephane Johnson MD Goodman, Brian Samuel, MD  06/25/23 1519

## 2023-06-24 ENCOUNTER — HOSPITAL ENCOUNTER (EMERGENCY)
Facility: CLINIC | Age: 68
Discharge: HOME | End: 2023-06-24
Attending: EMERGENCY MEDICINE | Admitting: EMERGENCY MEDICINE
Payer: COMMERCIAL

## 2023-06-24 ENCOUNTER — APPOINTMENT (OUTPATIENT)
Dept: CT IMAGING | Facility: CLINIC | Age: 68
End: 2023-06-24
Attending: EMERGENCY MEDICINE
Payer: COMMERCIAL

## 2023-06-24 VITALS
SYSTOLIC BLOOD PRESSURE: 104 MMHG | OXYGEN SATURATION: 95 % | DIASTOLIC BLOOD PRESSURE: 61 MMHG | TEMPERATURE: 97.9 F | RESPIRATION RATE: 11 BRPM | HEART RATE: 67 BPM

## 2023-06-24 DIAGNOSIS — F10.929 ALCOHOLIC INTOXICATION WITH COMPLICATION (H): ICD-10-CM
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
## 2023-06-24 LAB
ALBUMIN SERPL BCG-MCNC: 4 G/DL (ref 3.5–5.2)
ALP SERPL-CCNC: 81 U/L (ref 35–104)
ALT SERPL W P-5'-P-CCNC: 40 U/L (ref 0–50)
ANION GAP SERPL CALCULATED.3IONS-SCNC: 13 MMOL/L (ref 7–15)
AST SERPL W P-5'-P-CCNC: 65 U/L (ref 0–45)
BASOPHILS # BLD AUTO: 0.1 10E3/UL (ref 0–0.2)
BASOPHILS NFR BLD AUTO: 1 %
BILIRUB SERPL-MCNC: 0.3 MG/DL
BUN SERPL-MCNC: 10.2 MG/DL (ref 8–23)
CALCIUM SERPL-MCNC: 7.9 MG/DL (ref 8.8–10.2)
CHLORIDE SERPL-SCNC: 106 MMOL/L (ref 98–107)
CREAT SERPL-MCNC: 0.64 MG/DL (ref 0.51–0.95)
DEPRECATED HCO3 PLAS-SCNC: 24 MMOL/L (ref 22–29)
EOSINOPHIL # BLD AUTO: 0.3 10E3/UL (ref 0–0.7)
EOSINOPHIL NFR BLD AUTO: 7 %
ERYTHROCYTE [DISTWIDTH] IN BLOOD BY AUTOMATED COUNT: 14.2 % (ref 10–15)
ETHANOL SERPL-MCNC: 0.37 G/DL
GFR SERPL CREATININE-BSD FRML MDRD: >90 ML/MIN/1.73M2
GLUCOSE SERPL-MCNC: 84 MG/DL (ref 70–99)
HCO3 BLDV-SCNC: 30 MMOL/L (ref 21–28)
HCT VFR BLD AUTO: 40.8 % (ref 35–47)
HGB BLD-MCNC: 13.4 G/DL (ref 11.7–15.7)
HOLD SPECIMEN: NORMAL
IMM GRANULOCYTES # BLD: 0 10E3/UL
IMM GRANULOCYTES NFR BLD: 1 %
LACTATE BLD-SCNC: 0.6 MMOL/L
LYMPHOCYTES # BLD AUTO: 1.7 10E3/UL (ref 0.8–5.3)
LYMPHOCYTES NFR BLD AUTO: 40 %
MAGNESIUM SERPL-MCNC: 2.3 MG/DL (ref 1.7–2.3)
MCH RBC QN AUTO: 32.2 PG (ref 26.5–33)
MCHC RBC AUTO-ENTMCNC: 32.8 G/DL (ref 31.5–36.5)
MCV RBC AUTO: 98 FL (ref 78–100)
MONOCYTES # BLD AUTO: 0.5 10E3/UL (ref 0–1.3)
MONOCYTES NFR BLD AUTO: 11 %
NEUTROPHILS # BLD AUTO: 1.8 10E3/UL (ref 1.6–8.3)
NEUTROPHILS NFR BLD AUTO: 40 %
NRBC # BLD AUTO: 0 10E3/UL
NRBC BLD AUTO-RTO: 0 /100
NT-PROBNP SERPL-MCNC: 56 PG/ML (ref 0–900)
PCO2 BLDV: 61 MM HG (ref 40–50)
PH BLDV: 7.29 [PH] (ref 7.32–7.43)
PLATELET # BLD AUTO: 270 10E3/UL (ref 150–450)
PO2 BLDV: 20 MM HG (ref 25–47)
POTASSIUM SERPL-SCNC: 3.8 MMOL/L (ref 3.4–5.3)
PROT SERPL-MCNC: 7 G/DL (ref 6.4–8.3)
RBC # BLD AUTO: 4.16 10E6/UL (ref 3.8–5.2)
SAO2 % BLDV: 26 % (ref 94–100)
SODIUM SERPL-SCNC: 143 MMOL/L (ref 136–145)
TROPONIN T SERPL HS-MCNC: 9 NG/L
WBC # BLD AUTO: 4.4 10E3/UL (ref 4–11)

## 2023-06-24 PROCEDURE — 72125 CT NECK SPINE W/O DYE: CPT

## 2023-06-24 PROCEDURE — 258N000003 HC RX IP 258 OP 636: Performed by: EMERGENCY MEDICINE

## 2023-06-24 PROCEDURE — 83735 ASSAY OF MAGNESIUM: CPT | Performed by: EMERGENCY MEDICINE

## 2023-06-24 PROCEDURE — 84484 ASSAY OF TROPONIN QUANT: CPT | Performed by: EMERGENCY MEDICINE

## 2023-06-24 PROCEDURE — 82803 BLOOD GASES ANY COMBINATION: CPT

## 2023-06-24 PROCEDURE — 70450 CT HEAD/BRAIN W/O DYE: CPT

## 2023-06-24 PROCEDURE — 83880 ASSAY OF NATRIURETIC PEPTIDE: CPT | Performed by: EMERGENCY MEDICINE

## 2023-06-24 PROCEDURE — 82310 ASSAY OF CALCIUM: CPT | Performed by: EMERGENCY MEDICINE

## 2023-06-24 PROCEDURE — 82374 ASSAY BLOOD CARBON DIOXIDE: CPT | Performed by: EMERGENCY MEDICINE

## 2023-06-24 PROCEDURE — 99285 EMERGENCY DEPT VISIT HI MDM: CPT | Mod: 25

## 2023-06-24 PROCEDURE — 96361 HYDRATE IV INFUSION ADD-ON: CPT

## 2023-06-24 PROCEDURE — 96365 THER/PROPH/DIAG IV INF INIT: CPT

## 2023-06-24 PROCEDURE — 250N000011 HC RX IP 250 OP 636: Performed by: EMERGENCY MEDICINE

## 2023-06-24 PROCEDURE — 85025 COMPLETE CBC W/AUTO DIFF WBC: CPT | Performed by: EMERGENCY MEDICINE

## 2023-06-24 PROCEDURE — 36415 COLL VENOUS BLD VENIPUNCTURE: CPT | Performed by: EMERGENCY MEDICINE

## 2023-06-24 PROCEDURE — 250N000009 HC RX 250: Performed by: EMERGENCY MEDICINE

## 2023-06-24 PROCEDURE — 82077 ASSAY SPEC XCP UR&BREATH IA: CPT | Performed by: EMERGENCY MEDICINE

## 2023-06-24 RX ADMIN — FOLIC ACID: 5 INJECTION, SOLUTION INTRAMUSCULAR; INTRAVENOUS; SUBCUTANEOUS at 16:10

## 2023-06-24 RX ADMIN — SODIUM CHLORIDE 1000 ML: 9 INJECTION, SOLUTION INTRAVENOUS at 13:43

## 2023-06-24 ASSESSMENT — ACTIVITIES OF DAILY LIVING (ADL)
ADLS_ACUITY_SCORE: 35
ADLS_ACUITY_SCORE: 35

## 2023-06-24 NOTE — ED TRIAGE NOTES
EMS was called to the pt house after her SO found her down and minimally verbal.  Pt drowsy upon arrival, awakens to voice and answers simple questions.  Per EMS, pt has a history of ETOH abuse.     Triage Assessment     Row Name 06/24/23 [id number]       Triage Assessment (Adult)    Airway WDL WDL       Respiratory WDL    Respiratory WDL WDL       Cardiac WDL    Cardiac WDL WDL       Peripheral/Neurovascular WDL    Peripheral Neurovascular WDL WDL       Cognitive/Neuro/Behavioral WDL    Cognitive/Neuro/Behavioral WDL X  See note

## 2023-06-24 NOTE — DISCHARGE INSTRUCTIONS
*You should drink less alcohol.  *No new medications. Continue your current medications.  *Follow-up with your doctor for a recheck in 2-3 days.  *Return if you wish to go to detox, develop fever, withdrawal symptoms, vomiting, faint or feel like you will faint or become worse in any way.    Discharge Instructions  Alcohol Intoxication    You have been seen today with alcohol intoxication. This means that you have enough alcohol in your system to impair your ability to mentally and physically function. When you are intoxicated, we are not allowed to release you without a sober adult to be with you. You may not drive, operate dangerous equipment, or do anything else dangerous until you are sober.    You may have come to the Emergency Department because of your intoxication, or for another reason, such as because of an injury. No matter what the case is, this visit is a  red flag  regarding alcohol use, and you should consider whether your drinking pattern is a problem for you.     You may be at risk for alcohol-related problems if:    Men: you drink more than 14 drinks per week, or more than 4 drinks per occasion.    Women: you drink more than 7 drinks per week or more than 3 drinks per occasion.    You have black-outs.  You do things you regret while drinking.  You have legal problems because of drinking (DUI).  You have job problems because of drinking (you call in sick to work because of drinking).    CAGE Questions  Have you ever felt you should cut down on your drinking?  Have people annoyed you by criticizing your drinking?  Have you ever felt bad or guilty about your drinking?  Have you ever had a drink first thing in the morning to steady your nerves or get rid of a hangover (eye opener)?    If you answer yes to any of the CAGE questions, you may have a problem with alcohol.      Return to the Emergency Department if:  You become shaky or tremble when you try to stop drinking.    You have a seizure or pass  out.    You throw up (vomit) blood. This may be bright red or it may look like black coffee grounds.    You have blood in the stool. This may be bright red or appear as a black, tarry, bad smelling stool.    You become lightheaded or faint.      For further help, contact:   Your caregiver.    Alcoholics Anonymous (AA).    A drug or alcohol rehabilitation program.    You can get information on alcohol resources and groups by calling the number 205 or 1-592.463.9303 on any phone.       Seek medical care if:  You have persistent vomiting.    You have persistent pain in any part of your body.    You do not feel better after a few days.    If you were given a prescription for medicine here today, be sure to read all of the information (including the package insert) that comes with your prescription.  This will include important information about the medicine, its side effects, and any warnings that you need to know about.  The pharmacist who fills the prescription can provide more information and answer questions you may have about the medicine.  If you have questions or concerns that the pharmacist cannot address, please call or return to the Emergency Department.   Remember that you can always come back to the Emergency Department if you are not able to see your regular doctor in the amount of time listed above, if you get any new symptoms, or if there is anything that worries you.

## 2023-06-24 NOTE — ED PROVIDER NOTES
History     Chief Complaint:  Altered Mental Status     HPI   Megan Bettencourt is a 68 year old female with a history of seizures, alcohol dependency and alcohol withdrawal and hypertension who comes today with altered mental status from home.  The patient's boyfriend who lives with her woke up around 11 AM and found her on the kitchen floor.  She does not remember what happened.  She does not know if she hit her head.  Her boyfriend stayed at home with her until later in afternoon when he called EMS to bring her to the hospital.  He reports that she does have a history of alcohol dependency.  He reports that recently she has been in and out of the hospital quite a bit.  She had right heel bolts removed from a previous orthopedic surgery in the 90s later got an infection.  Approximately a week after that the came back in because she had alcohol withdrawal.  Since her discharge from the hospital he does report that she has been drinking.  He does not know if she was drinking recently.  He states that he went to dinner last night with some high school friends and he was not around her.  He did not see her until this morning.  The patient denies alcohol use.  She repeatedly states that she needs to leave.  She states that she feels fine.  She denies any pain anyplace.  She is trying to get out of the bed and difficult to redirect.    History and review of systems limited by patient's altered mental status.    Independent Historian:   Spouse/Partner - They report As above    Review of External Notes:   Reviewed discharge summary from hospitalization from 6/9/2023 to 6/10/2023.  Patient was admitted for dizziness, nausea, vomiting, high blood pressure and headache.  Symptoms improved significantly about after receiving diazepam and she was felt to have mild alcohol withdrawal syndrome and dehydration.    Reviewed ED provider note from 6/19/2023.  Patient was seen for hypertension.    Reviewed hospital follow-up note  from primary care physician on 2023.  Patient stated that she was feeling a bit shaky but otherwise well.    Medications:    acetaminophen (TYLENOL) 325 MG tablet  amphetamine-dextroamphetamine (ADDERALL) 10 MG tablet  diazepam (VALIUM) 5 MG tablet  ipratropium (ATROVENT) 0.03 % nasal spray  metoprolol succinate ER (TOPROL XL) 25 MG 24 hr tablet  venlafaxine (EFFEXOR) 37.5 MG tablet      Past Medical History:    Past Medical History:   Diagnosis Date     Anxiety      Cervical stenosis of spine      Pelvic fracture (H)      Thyroid disease      Vision disturbance        Past Surgical History:    Past Surgical History:   Procedure Laterality Date     BREAST SURGERY      augmentation      SECTION       COSMETIC BLEPHAROPLASTY LOWER LIDS BILATERAL  2014    Procedure: COSMETIC BLEPHAROPLASTY LOWER LIDS BILATERAL;  Surgeon: Loi Mcpherson MD;  Location: [facility]     COSMETIC SURGERY      neck lift     ORTHOPEDIC SURGERY  2017    bilateral wrists and right heel fracture     REPAIR PTOSIS BILATERAL  2014    Procedure: REPAIR PTOSIS BILATERAL;  Surgeon: Loi Mcpherson MD;  Location: [facility]        Physical Exam     Patient Vitals for the past 24 hrs:   BP Temp Temp src Pulse Resp SpO2   23 1458 -- -- -- 67 11 --   23 1433 -- -- -- -- -- 95 %   23 1410 -- -- -- 64 16 --   23 1400 104/61 -- -- 76 27 --   23 1359 -- -- -- 75 12 --   23 1337 (!) 86/61 97.9  F (36.6  C) Temporal -- -- --   23 1333 -- -- -- 72 18 96 %        Physical Exam  General: Well-nourished, trying to climb out of cart when I enter room  Eyes: PERRL, conjunctivae pink no scleral icterus or conjunctival injection  ENT:  Moist mucus membranes, posterior oropharynx clear without erythema or exudates  Respiratory:  Lungs clear to auscultation bilaterally, no crackles/rubs/wheezes.  Good air movement  CV: Normal rate and rhythm, no murmurs/rubs/gallops  GI:  Abdomen soft and  non-distended.  Normoactive BS.  No tenderness, guarding or rebound  Skin: Warm, dry.  No rashes or petechiae  Musculoskeletal: No peripheral edema or calf tenderness.No midline tenderness of the cervical/thoracic/lumbar spine.  No tenderness/crepitus/bony stepoffs over the clavicles, chest wall, pelvis, arms or legs.  Neuro: Alert and oriented to person/place. +slurred speech.  No tongue fasciculations.  No hand tremors. PERRL, EOMI no nystagmus, no aphasia/facial droop/dysarthria, tongue midline, symmetric palatal elevation, normal strength at SCM/trapezius/BUE/BLE, normal coordination to FNF at BUE, gait deferred, negative romberg, sensation intact to LT over face/BUE/BLE  Psychiatric: Very intoxicated affect      Emergency Department Course   ECG  ECG results from 06/19/23   EKG 12 lead     Value    Systolic Blood Pressure     Diastolic Blood Pressure     Ventricular Rate 99    Atrial Rate 99    WY Interval 168    QRS Duration 90        QTc 444    P Axis 75    R AXIS -57    T Axis 73    Interpretation ECG      Sinus rhythm with Premature supraventricular complexes  Left axis deviation  Abnormal ECG  When compared with ECG of 09-JUN-2023 17:44,  Premature supraventricular complexes are now Present  T wave amplitude has increased in Inferior leads  Confirmed by GENERATED REPORT, COMPUTER ([id number]),  Xuan Quintanilla ([id number]) on 6/19/2023 1:06:28 PM         Imaging:  Cervical spine CT w/o contrast   Final Result   IMPRESSION:   HEAD CT:   1.  No acute intracranial finding.      CERVICAL SPINE CT:   1.  No CT evidence for acute fracture or post traumatic subluxation.   2.  Multilevel spondylosis, as described.      Head CT w/o contrast   Final Result   IMPRESSION:   HEAD CT:   1.  No acute intracranial finding.      CERVICAL SPINE CT:   1.  No CT evidence for acute fracture or post traumatic subluxation.   2.  Multilevel spondylosis, as described.         Report per radiology    Laboratory:  Labs Ordered and  Resulted from Time of ED Arrival to Time of ED Departure   COMPREHENSIVE METABOLIC PANEL - Abnormal       Result Value    Sodium 143      Potassium 3.8      Chloride 106      Carbon Dioxide (CO2) 24      Anion Gap 13      Urea Nitrogen 10.2      Creatinine 0.64      Calcium 7.9 (*)     Glucose 84      Alkaline Phosphatase 81      AST 65 (*)     ALT 40      Protein Total 7.0      Albumin 4.0      Bilirubin Total 0.3      GFR Estimate >90     ETHYL ALCOHOL LEVEL - Abnormal    Alcohol ethyl 0.37 (*)    ISTAT GASES LACTATE VENOUS POCT - Abnormal    Lactic Acid POCT 0.6      Bicarbonate Venous POCT 30 (*)     O2 Sat, Venous POCT 26 (*)     pCO2 Venous POCT 61 (*)     pH Venous POCT 7.29 (*)     pO2 Venous POCT 20 (*)    MAGNESIUM - Normal    Magnesium 2.3     NT PROBNP INPATIENT - Normal    N terminal Pro BNP Inpatient 56     TROPONIN T, HIGH SENSITIVITY - Normal    Troponin T, High Sensitivity 9     CBC WITH PLATELETS AND DIFFERENTIAL    WBC Count 4.4      RBC Count 4.16      Hemoglobin 13.4      Hematocrit 40.8      MCV 98      MCH 32.2      MCHC 32.8      RDW 14.2      Platelet Count 270      % Neutrophils 40      % Lymphocytes 40      % Monocytes 11      % Eosinophils 7      % Basophils 1      % Immature Granulocytes 1      NRBCs per 100 WBC 0      Absolute Neutrophils 1.8      Absolute Lymphocytes 1.7      Absolute Monocytes 0.5      Absolute Eosinophils 0.3      Absolute Basophils 0.1      Absolute Immature Granulocytes 0.0      Absolute NRBCs 0.0        Emergency Department Course & Assessments:       Interventions:  Medications   0.9% sodium chloride BOLUS (0 mLs Intravenous Stopped 6/24/23 1612)   sodium chloride 0.9 % 1,000 mL with Infuvite Adult 10 mL, thiamine 100 mg, folic acid 1 mg infusion ( Intravenous Stopped 6/24/23 1711)        Assessments:  I evaluated and assessed the patient.  Discussed plan of care.  I reevaluated the patient.  She is resting and calmer.  Discussed plan of care and disposition with  her partner.  Patient ambulated without difficulty.  Her boyfriend felt comfortable with her going home.    Independent Interpretation (X-rays, CTs, rhythm strip):  I reviewed and interpreted the head CT and see no evidence of intracranial hemorrhage.    Consultations/Discussion of Management or Tests:  None        Social Determinants of Health affecting care:   None    Disposition:  The patient was discharged to home.     Impression & Plan      Medical Decision Making:  Megan Bettencourt is a 68 year old female who comes for altered mental status and a possible unwitnessed fall.  On arrival she was hypotensive.  She was given IV fluid bolus and her blood pressure came up and remained up.  I considered a broad differential including sepsis, cardiomyopathy, acute coronary syndrome, acute blood loss anemia or other trauma.  She is intoxicated on examination and her blood work confirms this.  She is not in alcohol withdrawal.  She was given IV fluids as well as a banana bag.  We obtained a CT of her head as well as her cervical spine given that she is intoxicated and may have had a fall.  Fortunately this showed no intra cranial hemorrhage, skull fracture or cervical spine fracture.  She is neurologically intact.  She has no abdominal pain.  She has no tenderness on the remainder of a head to toe trauma examination.  She did improve and was able to ambulate.  Her boyfriend is sober and is willing to take her home and care for her at home.  At this time, with reasonable clinical confidence, I do believe she safe for discharge home but they are given appropriate return precautions.    Diagnosis:    ICD-10-CM    1. Alcoholic intoxication with complication (H)  F10.929       2. Fall, initial encounter  W19.XXXA            Discharge Medications:  Discharge Medication List as of 6/24/2023  5:17 PM           6/24/2023   Deena Van MD Cho, Amy C, MD  06/24/23 [id number]

## 2023-06-24 NOTE — ED NOTES
Bed: ED21  Expected date:   Expected time:   Means of arrival:   Comments:  E3  68 F etoh   Here now

## 2023-06-24 NOTE — ED NOTES
Pt's partner at bedside.  States found the pt on the kitchen floor at 1100 this am, is unsure if she fell or not.

## 2023-07-22 ENCOUNTER — HOSPITAL ENCOUNTER (EMERGENCY)
Facility: CLINIC | Age: 68
Discharge: HOME | End: 2023-07-23
Attending: EMERGENCY MEDICINE | Admitting: EMERGENCY MEDICINE
Payer: COMMERCIAL

## 2023-07-22 VITALS
SYSTOLIC BLOOD PRESSURE: 107 MMHG | HEART RATE: 69 BPM | RESPIRATION RATE: 20 BRPM | OXYGEN SATURATION: 93 % | DIASTOLIC BLOOD PRESSURE: 54 MMHG | TEMPERATURE: 98.6 F

## 2023-07-22 DIAGNOSIS — F10.920 ALCOHOLIC INTOXICATION WITHOUT COMPLICATION (H): ICD-10-CM

## 2023-07-22 PROCEDURE — 99283 EMERGENCY DEPT VISIT LOW MDM: CPT

## 2023-07-22 ASSESSMENT — ACTIVITIES OF DAILY LIVING (ADL): ADLS_ACUITY_SCORE: 35

## 2023-07-23 NOTE — ED TRIAGE NOTES
Patient BIBA for ETOH, was found in bathroom on floor at [employer] with empty bottle of 1.75. Patient combative with medics, restrained and brought here.Unable to care for self. On transport hold.

## 2023-07-23 NOTE — ED NOTES
Patient up to nursing window demanding to leave. Informed patient again that she is on a hold and cant leave at this time.

## 2023-07-23 NOTE — ED NOTES
Patient coming out of room attempting to leave, wants to walk out. Informed patient that she is on a hold. Patient laid back down in room.

## 2023-07-23 NOTE — DISCHARGE INSTRUCTIONS

## 2023-07-23 NOTE — ED PROVIDER NOTES
History     Chief Complaint:  Alcohol Intoxication       The history is provided by the patient.      Megan Bettencourt is a 68 year old female with a history of alcohol abuse. The patient today went into a [employer] and stole a 1.75L bottle of alcohol. She then went into the bathroom and drank the entire bottle. She was found passed out in the bathroom by staff. The patient does have someone that she can call to pick her up.     Independent Historian:    No independent historian     Review of External Notes:  Yes-I reviewed her admission to this hospital for alcohol withdrawal in early  along with 2 subsequent visits to this ER for alcohol intoxication.    Medications:    Adderall   Valium   Toprol   Effexor     Past Medical History:    Anxiety   Cervical stenosis   Pelvic fracture   Thyroid disease   Alcohol abuse   Alcohol withdrawal      Past Surgical History:    Breast augmentation    section   Cosmetic blepharoplasty   Neck lift   orthopedic surgery   Repair ptosis      Physical Exam     Patient Vitals for the past 24 hrs:   BP Temp Temp src Pulse Resp SpO2   23 2214 107/54 98.6  F (37  C) Oral 69 20 93 %        Physical Exam  Eye:  Pupils are equal, round, and reactive.  Extraocular movements intact.    ENT:  No rhinorrhea.  Moist mucus membranes.  Normal tongue and tonsil.    Cardiac:  Regular rate and rhythm.  No murmurs, gallops, or rubs.    Pulmonary:  Clear to auscultation bilaterally.  No wheezes, rales, or rhonchi.    Abdomen:  Positive bowel sounds.  Abdomen is soft and non-distended, without focal tenderness.    Musculoskeletal:  Normal movement of all extremities without evidence for deficit.    Skin:  Warm and dry without rashes.    Neurologic:  Non-focal exam without asymmetric weakness or numbness.     Psychiatric:  Normal affect with appropriate interaction with examiner. The patient is moderately intoxicated.     Emergency Department Course      Emergency Department Course  & Assessments:         Assessments:  2329 I obtained history and examined the patient as noted above.     Independent Interpretation (X-rays, CTs, rhythm strip):  None    Consultations/Discussion of Management or Tests:  None       Social Determinants of Health affecting care:  None     Disposition:  The patient was discharged to home and was picked up by her brother.     Impression & Plan    CMS Diagnoses: None     Medical Decision Making:  This 68-year-old alcoholic presents to us in an intoxicated state.  Per paramedic and police report, she went to a local liquor store, took a 1.75 bottle of wine, and consumed all in the bathroom.  She was found passed out there and was brought to us for her intoxication.    The time my evaluation, she smelled strongly of alcohol but is otherwise alert and ambulatory.  She is frustrated that she is here and is demanding to leave.  I feel that she is too intoxicated at this time to be able to safely walk home which is her desire.  However, I do feel she is safe to leave with a sober family member.  She called her brother who is willing to take her home.  Otherwise, with her normal vitals, well-documented history of alcoholism, and no other signs of trauma or concerning features, I do not believe that she requires other blood work or imaging.  She was advised to continue to work on her sobriety and to return to us for any other emergent concerns.    Diagnosis:    ICD-10-CM    1. Alcoholic intoxication without complication (H)  F10.920          Scribe Disclosure:  I, Mercy Simms, am serving as a scribe at 11:40 PM on 7/22/2023 to document services personally performed by Trierweiler, Chad A, MD based on my observations and the provider's statements to me.    7/22/2023   Trierweiler, Chad A, MD Trierweiler, Chad A, MD  07/23/23 [id number]

## 2023-07-23 NOTE — ED NOTES
Bed: [facility]  Expected date:   Expected time:   Means of arrival:   Comments:  [place] 68F etoh, combative

## 2023-08-31 ENCOUNTER — HOSPITAL ENCOUNTER (EMERGENCY)
Facility: CLINIC | Age: 68
Discharge: HOME | End: 2023-08-31
Attending: EMERGENCY MEDICINE | Admitting: EMERGENCY MEDICINE
Payer: COMMERCIAL

## 2023-08-31 VITALS
SYSTOLIC BLOOD PRESSURE: 133 MMHG | DIASTOLIC BLOOD PRESSURE: 80 MMHG | OXYGEN SATURATION: 96 % | TEMPERATURE: 98.1 F | HEIGHT: 68 IN | HEART RATE: 69 BPM | RESPIRATION RATE: 16 BRPM | BODY MASS INDEX: 18.94 KG/M2 | WEIGHT: 125 LBS

## 2023-08-31 DIAGNOSIS — F10.10 ALCOHOL ABUSE, EPISODIC DRINKING BEHAVIOR: ICD-10-CM

## 2023-08-31 DIAGNOSIS — R56.9 SEIZURE (H): ICD-10-CM

## 2023-08-31 LAB
ALBUMIN SERPL BCG-MCNC: 3.8 G/DL (ref 3.5–5.2)
ALBUMIN SERPL BCG-MCNC: 4.6 G/DL (ref 3.5–5.2)
ALP SERPL-CCNC: 62 U/L (ref 35–104)
ALP SERPL-CCNC: ABNORMAL U/L
ALT SERPL W P-5'-P-CCNC: 24 U/L (ref 0–50)
ALT SERPL W P-5'-P-CCNC: ABNORMAL U/L
ANION GAP SERPL CALCULATED.3IONS-SCNC: 15 MMOL/L (ref 7–15)
AST SERPL W P-5'-P-CCNC: 38 U/L (ref 0–45)
AST SERPL W P-5'-P-CCNC: ABNORMAL U/L
BASOPHILS # BLD AUTO: 0 10E3/UL (ref 0–0.2)
BASOPHILS NFR BLD AUTO: 1 %
BILIRUB DIRECT SERPL-MCNC: <0.2 MG/DL (ref 0–0.3)
BILIRUB SERPL-MCNC: 0.7 MG/DL
BILIRUB SERPL-MCNC: 0.7 MG/DL
BUN SERPL-MCNC: 11 MG/DL (ref 8–23)
CALCIUM SERPL-MCNC: 9 MG/DL (ref 8.8–10.2)
CHLORIDE SERPL-SCNC: 100 MMOL/L (ref 98–107)
CREAT SERPL-MCNC: 0.6 MG/DL (ref 0.51–0.95)
DEPRECATED HCO3 PLAS-SCNC: 25 MMOL/L (ref 22–29)
EOSINOPHIL # BLD AUTO: 0.1 10E3/UL (ref 0–0.7)
EOSINOPHIL NFR BLD AUTO: 1 %
ERYTHROCYTE [DISTWIDTH] IN BLOOD BY AUTOMATED COUNT: 13.2 % (ref 10–15)
ETHANOL SERPL-MCNC: <0.01 G/DL
GFR SERPL CREATININE-BSD FRML MDRD: >90 ML/MIN/1.73M2
GLUCOSE SERPL-MCNC: 96 MG/DL (ref 70–99)
HCT VFR BLD AUTO: 47.6 % (ref 35–47)
HGB BLD-MCNC: 15.6 G/DL (ref 11.7–15.7)
IMM GRANULOCYTES # BLD: 0 10E3/UL
IMM GRANULOCYTES NFR BLD: 0 %
LYMPHOCYTES # BLD AUTO: 1.7 10E3/UL (ref 0.8–5.3)
LYMPHOCYTES NFR BLD AUTO: 27 %
MAGNESIUM SERPL-MCNC: 2 MG/DL (ref 1.7–2.3)
MCH RBC QN AUTO: 32.9 PG (ref 26.5–33)
MCHC RBC AUTO-ENTMCNC: 32.8 G/DL (ref 31.5–36.5)
MCV RBC AUTO: 100 FL (ref 78–100)
MONOCYTES # BLD AUTO: 0.5 10E3/UL (ref 0–1.3)
MONOCYTES NFR BLD AUTO: 8 %
NEUTROPHILS # BLD AUTO: 3.8 10E3/UL (ref 1.6–8.3)
NEUTROPHILS NFR BLD AUTO: 63 %
NRBC # BLD AUTO: 0 10E3/UL
NRBC BLD AUTO-RTO: 0 /100
PLATELET # BLD AUTO: 502 10E3/UL (ref 150–450)
POTASSIUM SERPL-SCNC: 5.3 MMOL/L (ref 3.4–5.3)
PROT SERPL-MCNC: 6.2 G/DL (ref 6.4–8.3)
PROT SERPL-MCNC: 8.4 G/DL (ref 6.4–8.3)
RBC # BLD AUTO: 4.74 10E6/UL (ref 3.8–5.2)
SODIUM SERPL-SCNC: 140 MMOL/L (ref 136–145)
WBC # BLD AUTO: 6.1 10E3/UL (ref 4–11)

## 2023-08-31 PROCEDURE — 82077 ASSAY SPEC XCP UR&BREATH IA: CPT | Performed by: EMERGENCY MEDICINE

## 2023-08-31 PROCEDURE — 85025 COMPLETE CBC W/AUTO DIFF WBC: CPT | Performed by: EMERGENCY MEDICINE

## 2023-08-31 PROCEDURE — 96361 HYDRATE IV INFUSION ADD-ON: CPT

## 2023-08-31 PROCEDURE — 250N000011 HC RX IP 250 OP 636: Performed by: EMERGENCY MEDICINE

## 2023-08-31 PROCEDURE — 258N000003 HC RX IP 258 OP 636: Performed by: EMERGENCY MEDICINE

## 2023-08-31 PROCEDURE — 84155 ASSAY OF PROTEIN SERUM: CPT | Performed by: EMERGENCY MEDICINE

## 2023-08-31 PROCEDURE — 96360 HYDRATION IV INFUSION INIT: CPT

## 2023-08-31 PROCEDURE — 80177 DRUG SCRN QUAN LEVETIRACETAM: CPT | Performed by: EMERGENCY MEDICINE

## 2023-08-31 PROCEDURE — 250N000013 HC RX MED GY IP 250 OP 250 PS 637: Performed by: EMERGENCY MEDICINE

## 2023-08-31 PROCEDURE — 83735 ASSAY OF MAGNESIUM: CPT | Performed by: EMERGENCY MEDICINE

## 2023-08-31 PROCEDURE — 82248 BILIRUBIN DIRECT: CPT | Performed by: EMERGENCY MEDICINE

## 2023-08-31 PROCEDURE — 36415 COLL VENOUS BLD VENIPUNCTURE: CPT | Performed by: EMERGENCY MEDICINE

## 2023-08-31 PROCEDURE — 99285 EMERGENCY DEPT VISIT HI MDM: CPT | Mod: 25

## 2023-08-31 RX ORDER — MULTIPLE VITAMINS W/ MINERALS TAB 9MG-400MCG
1 TAB ORAL ONCE
Status: COMPLETED | OUTPATIENT
Start: 2023-08-31 | End: 2023-08-31

## 2023-08-31 RX ORDER — LORAZEPAM 2 MG/ML
1 INJECTION INTRAMUSCULAR ONCE
Status: COMPLETED | OUTPATIENT
Start: 2023-08-31 | End: 2023-08-31

## 2023-08-31 RX ORDER — LORAZEPAM 1 MG/1
1 TABLET ORAL
Qty: 6 TABLET | Refills: 0 | Status: SHIPPED | OUTPATIENT
Start: 2023-08-31

## 2023-08-31 RX ORDER — LEVETIRACETAM 500 MG/1
500 TABLET ORAL ONCE
Status: COMPLETED | OUTPATIENT
Start: 2023-08-31 | End: 2023-08-31

## 2023-08-31 RX ORDER — FOLIC ACID 1 MG/1
1 TABLET ORAL ONCE
Status: COMPLETED | OUTPATIENT
Start: 2023-08-31 | End: 2023-08-31

## 2023-08-31 RX ORDER — SODIUM CHLORIDE 9 MG/ML
INJECTION, SOLUTION INTRAVENOUS CONTINUOUS
Status: DISCONTINUED | OUTPATIENT
Start: 2023-08-31 | End: 2023-09-01 | Stop reason: HOSPADM

## 2023-08-31 RX ADMIN — LEVETIRACETAM 500 MG: 500 TABLET, FILM COATED ORAL at 22:33

## 2023-08-31 RX ADMIN — THIAMINE HCL TAB 100 MG 100 MG: 100 TAB at 19:24

## 2023-08-31 RX ADMIN — MULTIPLE VITAMINS W/ MINERALS TAB 1 TABLET: TAB at 19:24

## 2023-08-31 RX ADMIN — FOLIC ACID 1 MG: 1 TABLET ORAL at 19:24

## 2023-08-31 RX ADMIN — LORAZEPAM 1 MG: 2 INJECTION INTRAMUSCULAR; INTRAVENOUS at 19:04

## 2023-08-31 RX ADMIN — SODIUM CHLORIDE: 9 INJECTION, SOLUTION INTRAVENOUS at 19:07

## 2023-08-31 ASSESSMENT — ACTIVITIES OF DAILY LIVING (ADL)
ADLS_ACUITY_SCORE: 35
ADLS_ACUITY_SCORE: 35

## 2023-08-31 NOTE — ED TRIAGE NOTES
Pt reports nausea and vomiting and generalized weakness that she believes may be related to her new seizure medication. Pt had first seizure a week ago. Also reports having an episode last night where she was crawling around and combative with her significant other but does not remember it.      Triage Assessment       Row Name 08/31/23 9229       Triage Assessment (Adult)    Airway WDL WDL       Respiratory WDL    Respiratory WDL WDL       Skin Circulation/Temperature WDL    Skin Circulation/Temperature WDL WDL       Cardiac WDL    Cardiac WDL WDL       Peripheral/Neurovascular WDL    Peripheral Neurovascular WDL WDL       Cognitive/Neuro/Behavioral WDL    Cognitive/Neuro/Behavioral WDL WDL

## 2023-08-31 NOTE — ED NOTES
Rapid Assessment Note    History:   Megan Bettencourt is a 68 year old female with a history of alcohol abuse who presents with shakiness today.  She had an apparent witnessed generalized seizure at the [place] [employer] on August 20 and went to the emergency room in [place] and had a CT that was negative and started on Keppra 250 mg twice a day.  Follow-up with neurology was on 23 August and an EEG was done and was normal on 24 August.  She is scheduled for an MRI this upcoming Sunday with follow-up with neurology.  She thinks she might be having the shakiness from the Keppra but had not had any up until today and was not tolerating it fine.  No fevers or chills.  There was a question she may have had another seizure last night but it is unclear but she does not remember having an interaction with her boyfriend when she tried to go to the bathroom.  She seemed to be disoriented.  She denies recent alcohol use that its been at least 3 days.  Does have a history of alcoholism.    Neurology Progress Note from 8-23    Assessment and Plan:   Spell of altered consciousness   --witnessed seizure on 8/20 in [place]. Take by ambulance to hosptial  --CT of head was done and normal  --Started on Keppra 250mg BID  ---no side effects noted  ---education given on abstaining from alcohol  --patient does not have a 's license but was advised she cannot drive for 3 months from the date of episode, DMV form given   --Educated on seizure precautions  --EEG ordered  --MRI brain ordered  --Keppra level and LFTs ordered     EEG:  Impression:   This EEG study was within normal limits for waking and drowsiness. There   was no pathological slowing. There was no definite interictal epileptiform   abnormalities. There were no electrographic seizures. Clinical correlation   is advised.     YOON ROSEN MD, PHD, FA       Exam:   General:  Alert, interactive  Cardiovascular:  Well perfused  Lungs:  No respiratory distress, no  accessory muscle use  Neuro:  Moving all 4 extremities she is shaky.  Almost looks tremulous.  Skin:  Warm, dry  Psych:  Normal affect      Plan of Care: IV placed, 1 mg Ativan IV given, labs are ordered.  I evaluated the patient and developed an initial plan of care. I discussed this plan and explained that I, or one of my partners, would be returning to complete the evaluation.         Pauline Fox MD      8/31/2023  [employer]    Portions of this medical record were completed by a scribe. UPON MY REVIEW AND AUTHENTICATION BY ELECTRONIC SIGNATURE, this confirms (a) I performed the applicable clinical services, and (b) the record is accurate.        Pauline Fox MD  08/31/23 [id number]

## 2023-09-01 LAB — LEVETIRACETAM SERPL-MCNC: 13.3 ΜG/ML (ref 10–40)

## 2023-09-01 NOTE — ED PROVIDER NOTES
History     Chief Complaint:  Nausea & Vomiting and Generalized Weakness       HPI   Megan Bettencourt is a 68 year old female with a history of alcohol abuse who presents with shakiness today.  She had an apparent witnessed generalized seizure at the [place] [facility] on August 20 and went to the emergency room in [place] and had a CT that was negative and started on Keppra 250 mg twice a day.  Follow-up with neurology was on 23 August and an EEG was done and was normal on 24 August.  She is scheduled for an MRI this upcoming Sunday with follow-up with neurology.  She thinks she might be having the shakiness from the Keppra but had not had any up until today and was not tolerating it fine.  No fevers or chills.  There was a question she may have had another seizure last night but it is unclear but she does not remember having an interaction with her boyfriend when she tried to go to the bathroom.  She seemed to be disoriented.  She denies recent alcohol use that its been at least 3 days.  Does have a history of alcoholism.     Neurology Progress Note from 8-23    Assessment and Plan:   Spell of altered consciousness   --witnessed seizure on 8/20 in [place]. Take by ambulance to hosptial  --CT of head was done and normal  --Started on Keppra 250mg BID  ---no side effects noted  ---education given on abstaining from alcohol  --patient does not have a 's license but was advised she cannot drive for 3 months from the date of episode, DMV form given   --Educated on seizure precautions  --EEG ordered  --MRI brain ordered  --Keppra level and LFTs ordered      EEG:  Impression:   This EEG study was within normal limits for waking and drowsiness. There   was no pathological slowing. There was no definite interictal epileptiform   abnormalities. There were no electrographic seizures. Clinical correlation   is advised.     YOON ROSEN MD, PHD, [facility]      Independent Historian:   None - Patient Only    Review  "of External Notes:   I reviewed the emergency room note from 2023, the office visit from 2023 and the office visit from 2023      Medications:    acetaminophen (TYLENOL) 325 MG tablet  amphetamine-dextroamphetamine (ADDERALL) 10 MG tablet  diazepam (VALIUM) 5 MG tablet  ipratropium (ATROVENT) 0.03 % nasal spray  metoprolol succinate ER (TOPROL XL) 25 MG 24 hr tablet  venlafaxine (EFFEXOR) 37.5 MG tablet        Past Medical History:    Past Medical History:   Diagnosis Date    Anxiety     Cervical stenosis of spine     Pelvic fracture (H)     Thyroid disease     Vision disturbance        Past Surgical History:    Past Surgical History:   Procedure Laterality Date    BREAST SURGERY      augmentation     SECTION      COSMETIC BLEPHAROPLASTY LOWER LIDS BILATERAL  2014    Procedure: COSMETIC BLEPHAROPLASTY LOWER LIDS BILATERAL;  Surgeon: Lio Mcpherson MD;  Location: [facility]    COSMETIC SURGERY      neck lift    ORTHOPEDIC SURGERY  2017    bilateral wrists and right heel fracture    REPAIR PTOSIS BILATERAL  2014    Procedure: REPAIR PTOSIS BILATERAL;  Surgeon: Loi Mcpherson MD;  Location: [facility]        Physical Exam   Patient Vitals for the past 24 hrs:   BP Temp Temp src Pulse Resp SpO2 Height Weight   23 2230 133/80 -- -- 69 15 -- -- --   23 2200 117/78 -- -- 78 14 94 % -- --   23 2130 129/73 -- -- 75 14 97 % -- --   23 2102 135/73 -- -- 72 14 97 % -- --   23 -- -- -- 72 14 99 % -- --   23 (!) 159/94 -- -- 72 18 -- -- --   23 184 (!) 151/91 98.1  F (36.7  C) Temporal 77 18 96 % 1.727 m (5' 8\") 56.7 kg (125 lb)        Physical Exam  Nursing note and vitals reviewed.    Constitutional:  Appears comfortable but tremulous  HENT:                Nose normal.  No discharge.      Oral mucosa is moist.  Eyes:    Conjunctivae are normal without injection.  Pupils are equal.  Cardiovascular:  Normal rate, regular rhythm with " normal S1 and S2.      Normal heart sounds and peripheral pulses 2+ and equal.       No murmur or abraham.  Pulmonary:  Effort normal and breath sounds clear to auscultation bilaterally.    No respiratory distress.      GI:    Soft. No distension and no mass. No tenderness.    Musculoskeletal:  Normal range of motion. No extremity deformity.     No edema and no tenderness.    Neurological:   Alert and oriented. No focal weakness.  Tremulous.  Skin:    Skin is warm and dry. No rash noted.   Psychiatric:   Behavior is normal. Appropriate mood and affect.     Judgment and thought content normal.       Emergency Department Course           Laboratory:  Labs Ordered and Resulted from Time of ED Arrival to Time of ED Departure   COMPREHENSIVE METABOLIC PANEL - Abnormal       Result Value    Sodium 140      Potassium 5.3      Chloride 100      Carbon Dioxide (CO2) 25      Anion Gap 15      Urea Nitrogen 11.0      Creatinine 0.60      Calcium 9.0      Glucose 96      Alkaline Phosphatase        AST        ALT        Protein Total 8.4 (*)     Albumin 4.6      Bilirubin Total 0.7      GFR Estimate >90     CBC WITH PLATELETS AND DIFFERENTIAL - Abnormal    WBC Count 6.1      RBC Count 4.74      Hemoglobin 15.6      Hematocrit 47.6 (*)           MCH 32.9      MCHC 32.8      RDW 13.2      Platelet Count 502 (*)     % Neutrophils 63      % Lymphocytes 27      % Monocytes 8      % Eosinophils 1      % Basophils 1      % Immature Granulocytes 0      NRBCs per 100 WBC 0      Absolute Neutrophils 3.8      Absolute Lymphocytes 1.7      Absolute Monocytes 0.5      Absolute Eosinophils 0.1      Absolute Basophils 0.0      Absolute Immature Granulocytes 0.0      Absolute NRBCs 0.0     HEPATIC FUNCTION PANEL - Abnormal    Protein Total 6.2 (*)     Albumin 3.8      Bilirubin Total 0.7      Alkaline Phosphatase 62      AST 38      ALT 24      Bilirubin Direct <0.20     ETHYL ALCOHOL LEVEL - Normal    Alcohol ethyl <0.01     MAGNESIUM -  Normal    Magnesium 2.0     KEPPRA (LEVETIRACETAM) LEVEL          Emergency Department Course & Assessments:             Interventions:  Medications   sodium chloride 0.9% infusion ( Intravenous Rate/Dose Verify 8/31/23 2137)   LORazepam (ATIVAN) injection 1 mg (1 mg Intravenous $Given 8/31/23 1904)   thiamine (B-1) tablet 100 mg (100 mg Oral $Given 8/31/23 1924)   folic acid (FOLVITE) tablet 1 mg (1 mg Oral $Given 8/31/23 1924)   multivitamin w/minerals (THERA-VIT-M) tablet 1 tablet (1 tablet Oral $Given 8/31/23 1924)   levETIRAcetam (KEPPRA) tablet 500 mg (500 mg Oral $Given 8/31/23 2233)        Assessments:  1845    Independent Interpretation (X-rays, CTs, rhythm strip):  None    Consultations/Discussion of Management or Tests:  2225 I discussed the case with the neurologist Dr. Parmar       Social Determinants of Health affecting care:   None    Disposition:  The patient was discharged to home    Impression & Plan      Medical Decision Making:  Patient was evaluated for possible seizure again last night and some shakiness.  She was given Ativan and the shakiness stopped.  She does have a history of alcohol abuse and I am suspicious that her seizure recently and what ever happened last night if it was a seizure were probably related to alcohol withdrawal.  Her EEG is normal.  Her labs here are unremarkable including her LFTs good renal function and alcohol level is negligible.  She slept for a while here.  I talked with the neurologist and they agree that this is likely alcohol withdrawal seizures.  I am going to send her home with a small prescription for Ativan.  If she is finding she can stop drinking she needs to get back into treatment.    Increase your Keppra to 500 mg twice a day.  Stop drinking.  Follow-up with your primary doctor.  Keep your MRI appointment and follow-up with neurology.  If you start getting shaky again, take the Ativan every 2-3 hours as needed.    Diagnosis:    ICD-10-CM    1.  Seizure (H)  R56.9       2. Alcohol abuse, episodic drinking behavior  F10.10            Discharge Medications:  New Prescriptions    No medications on file          Pauline Fox MD  8/31/2023   Pauline Fox MD Powell, Tracy Alan, MD  08/31/23 7315

## 2023-09-01 NOTE — DISCHARGE INSTRUCTIONS
Increase your Keppra to 500 mg twice a day.  Stop drinking.  Follow-up with your primary doctor.  Keep your MRI appointment and follow-up with neurology.  If you start getting shaky again, take the Ativan every 2-3 hours as needed.

## 2023-10-02 ENCOUNTER — APPOINTMENT (OUTPATIENT)
Dept: URBAN - METROPOLITAN AREA CLINIC 256 | Age: 68
Setting detail: DERMATOLOGY
End: 2023-10-02

## 2023-10-02 VITALS — WEIGHT: 125 LBS | HEIGHT: 68 IN

## 2023-10-02 DIAGNOSIS — Z71.89 OTHER SPECIFIED COUNSELING: ICD-10-CM

## 2023-10-02 DIAGNOSIS — L81.4 OTHER MELANIN HYPERPIGMENTATION: ICD-10-CM

## 2023-10-02 DIAGNOSIS — L82.1 OTHER SEBORRHEIC KERATOSIS: ICD-10-CM

## 2023-10-02 DIAGNOSIS — L57.8 OTHER SKIN CHANGES DUE TO CHRONIC EXPOSURE TO NONIONIZING RADIATION: ICD-10-CM

## 2023-10-02 DIAGNOSIS — D18.0 HEMANGIOMA: ICD-10-CM

## 2023-10-02 DIAGNOSIS — D22 MELANOCYTIC NEVI: ICD-10-CM

## 2023-10-02 DIAGNOSIS — L40.0 PSORIASIS VULGARIS: ICD-10-CM

## 2023-10-02 PROBLEM — D22.62 MELANOCYTIC NEVI OF LEFT UPPER LIMB, INCLUDING SHOULDER: Status: ACTIVE | Noted: 2023-10-02

## 2023-10-02 PROBLEM — D22.61 MELANOCYTIC NEVI OF RIGHT UPPER LIMB, INCLUDING SHOULDER: Status: ACTIVE | Noted: 2023-10-02

## 2023-10-02 PROBLEM — D18.01 HEMANGIOMA OF SKIN AND SUBCUTANEOUS TISSUE: Status: ACTIVE | Noted: 2023-10-02

## 2023-10-02 PROBLEM — D22.71 MELANOCYTIC NEVI OF RIGHT LOWER LIMB, INCLUDING HIP: Status: ACTIVE | Noted: 2023-10-02

## 2023-10-02 PROBLEM — D22.72 MELANOCYTIC NEVI OF LEFT LOWER LIMB, INCLUDING HIP: Status: ACTIVE | Noted: 2023-10-02

## 2023-10-02 PROBLEM — D22.5 MELANOCYTIC NEVI OF TRUNK: Status: ACTIVE | Noted: 2023-10-02

## 2023-10-02 PROCEDURE — 36415 COLL VENOUS BLD VENIPUNCTURE: CPT

## 2023-10-02 PROCEDURE — OTHER PRESCRIPTION MEDICATION MANAGEMENT: OTHER

## 2023-10-02 PROCEDURE — 99204 OFFICE O/P NEW MOD 45 MIN: CPT

## 2023-10-02 PROCEDURE — OTHER ORDER TESTS: OTHER

## 2023-10-02 PROCEDURE — OTHER MIPS QUALITY: OTHER

## 2023-10-02 PROCEDURE — OTHER ADDITIONAL NOTES: OTHER

## 2023-10-02 PROCEDURE — OTHER SKYRIZI INITIATION: OTHER

## 2023-10-02 PROCEDURE — OTHER PRESCRIPTION: OTHER

## 2023-10-02 PROCEDURE — OTHER COUNSELING: OTHER

## 2023-10-02 PROCEDURE — OTHER VENIPUNCTURE: OTHER

## 2023-10-02 PROCEDURE — OTHER SUNSCREEN RECOMMENDATIONS: OTHER

## 2023-10-02 RX ORDER — RISANKIZUMAB-RZAA 150 MG/ML
INJECTION SUBCUTANEOUS
Qty: 1 | Refills: 2 | Status: ERX | COMMUNITY
Start: 2023-10-02

## 2023-10-02 ASSESSMENT — LOCATION SIMPLE DESCRIPTION DERM
LOCATION SIMPLE: LEFT POSTERIOR UPPER ARM
LOCATION SIMPLE: LEFT CHEEK
LOCATION SIMPLE: UPPER BACK
LOCATION SIMPLE: RIGHT UPPER BACK
LOCATION SIMPLE: LEFT FOREARM
LOCATION SIMPLE: ABDOMEN
LOCATION SIMPLE: RIGHT POSTERIOR UPPER ARM
LOCATION SIMPLE: POSTERIOR SCALP
LOCATION SIMPLE: LEFT UPPER ARM
LOCATION SIMPLE: RIGHT THIGH
LOCATION SIMPLE: LEFT LOWER BACK
LOCATION SIMPLE: RIGHT FOREARM
LOCATION SIMPLE: CHEST
LOCATION SIMPLE: RIGHT FOREHEAD
LOCATION SIMPLE: LEFT THIGH
LOCATION SIMPLE: LEFT UPPER BACK

## 2023-10-02 ASSESSMENT — LOCATION DETAILED DESCRIPTION DERM
LOCATION DETAILED: LEFT ANTERIOR DISTAL THIGH
LOCATION DETAILED: RIGHT INFERIOR UPPER BACK
LOCATION DETAILED: MIDDLE STERNUM
LOCATION DETAILED: MID-OCCIPITAL SCALP
LOCATION DETAILED: LEFT MEDIAL UPPER BACK
LOCATION DETAILED: LEFT ANTERIOR PROXIMAL THIGH
LOCATION DETAILED: LEFT VENTRAL PROXIMAL FOREARM
LOCATION DETAILED: LEFT INFERIOR LATERAL MIDBACK
LOCATION DETAILED: LEFT DISTAL POSTERIOR UPPER ARM
LOCATION DETAILED: RIGHT VENTRAL PROXIMAL FOREARM
LOCATION DETAILED: RIGHT VENTRAL DISTAL FOREARM
LOCATION DETAILED: RIGHT PROXIMAL DORSAL FOREARM
LOCATION DETAILED: LEFT INFERIOR CENTRAL MALAR CHEEK
LOCATION DETAILED: EPIGASTRIC SKIN
LOCATION DETAILED: RIGHT SUPERIOR MEDIAL FOREHEAD
LOCATION DETAILED: INFERIOR THORACIC SPINE
LOCATION DETAILED: RIGHT DISTAL LATERAL POSTERIOR UPPER ARM
LOCATION DETAILED: RIGHT LATERAL ABDOMEN
LOCATION DETAILED: LEFT VENTRAL DISTAL FOREARM
LOCATION DETAILED: RIGHT ANTERIOR DISTAL THIGH
LOCATION DETAILED: LEFT DISTAL DORSAL FOREARM
LOCATION DETAILED: RIGHT ANTERIOR PROXIMAL THIGH
LOCATION DETAILED: LEFT ANTECUBITAL SKIN

## 2023-10-02 ASSESSMENT — ITCH NUMERIC RATING SCALE: HOW SEVERE IS YOUR ITCHING?: 7

## 2023-10-02 ASSESSMENT — LOCATION ZONE DERM
LOCATION ZONE: LEG
LOCATION ZONE: TRUNK
LOCATION ZONE: ARM
LOCATION ZONE: SCALP
LOCATION ZONE: FACE

## 2023-10-02 ASSESSMENT — BSA PSORIASIS: % BODY COVERED IN PSORIASIS: 10

## 2023-10-02 ASSESSMENT — PGA PSORIASIS: PGA PSORIASIS 2020: MODERATE

## 2023-10-02 NOTE — HPI: RASH
Is This A New Presentation, Or A Follow-Up?: Rash
Additional History: Patient has tried topical steroids in the past but got frustrated as the psoriasis spots cover a large surface area.

## 2023-10-02 NOTE — PROCEDURE: COUNSELING
Detail Level: Zone
Detail Level: Detailed
Patient Specific Counseling (Will Not Stick From Patient To Patient): Discussed IPL or Fraxel laser. Gave information for consultation with our estheticians.
Detail Level: Generalized

## 2023-10-02 NOTE — PROCEDURE: VENIPUNCTURE
Detail Level: None
Bill [id number] For Specimen Handling/Conveyance To Laboratory?: no
Venipuncture Paragraph: An alcohol pad was applied to the venipuncture site. Venipuncture was performed using a butterfly needle. Pressure and a bandaid was applied to the site. No complications were noted.
Number Of Tubes Drawn: 2

## 2023-10-02 NOTE — PROCEDURE: PRESCRIPTION MEDICATION MANAGEMENT
Initiate Treatment: Inject one syringe Skyrizi 150mg/mL SC on day 0, then week 4, then every 12 weeks thereafter.
Render In Strict Bullet Format?: No
Detail Level: Zone

## 2023-10-02 NOTE — PROCEDURE: MIPS QUALITY
Quality 410: Psoriasis Clinical Response To Oral Systemic Or Biologic Mediations: Patient has been on biologic or system therapy for less than 6 months
Quality 130: Documentation Of Current Medications In The Medical Record: Current Medications Documented
Quality 110: Preventive Care And Screening: Influenza Immunization: Influenza Immunization previously received during influenza season
Quality 176: Tuberculosis Screening Prior To First Course Of Biologic And/Or Immune Response Modifier Therapy: Patient receiving first-time biologic and/or immune response modifier therapy, TB Screening Performed and Results Interpreted within 12 months
Quality 226: Preventive Care And Screening: Tobacco Use: Screening And Cessation Intervention: Patient screened for tobacco use and is an ex/non-smoker
Detail Level: Detailed
Quality 431: Preventive Care And Screening: Unhealthy Alcohol Use - Screening: Patient not identified as an unhealthy alcohol user when screened for unhealthy alcohol use using a systematic screening method

## 2023-10-02 NOTE — PROCEDURE: ADDITIONAL NOTES
Detail Level: Simple
Render Risk Assessment In Note?: no
Additional Notes: - Discussed with patient treatment such as topicals or biologics. Due to the larger surface area affected on her body, difficult to treat areas, and joint pain/swelling, patient would like to proceed with a biologic. Discussed [facility]ia. Patient enrollment forms filled out and faxed to [facility]. Discussed when we get Lia samples in that we will call patient to have her come in for injection training.

## 2023-10-02 NOTE — PROCEDURE: ORDER TESTS
[facility] Laboratory: [id number]
Bill For Surgical Tray: no
Expected Date Of Service: 10/02/2023
Billing Type: Third-Party Bill

## 2023-10-02 NOTE — PROCEDURE: SKYRIZI INITIATION
Is Methotrexate Contraindicated?: No
Detail Level: Zone
Pregnancy And Lactation Warning Text: The risk during pregnancy and breastfeeding is uncertain with this medication.
Skyrizi Monitoring Guidelines: A yearly test for tuberculosis is required while taking Skyrizi.
Diagnosis (Required): Psoriasis
Skyrizi Dosing: 150 mg SC week 0 and week 4 then every 12 weeks thereafter
Add Pregnancy And Lactation Warning To Medication Counseling?: Yes

## 2023-10-25 NOTE — ED NOTES
Potential access sites were evaluated for patency using ultrasound.   The left femoral vein was selected. Access was obtained under with Sonosite guidance using a standard 18 guage needle with direct visualization of needle entry.    Pt was on a roof , ladder started to fall. Jumped off, down about 10 feet. Landed on both feet. C/o severe right ankle pain. Crawled across the street to her car,drove here . No LOC, did not hit her head, denies any injury to anywhere else

## 2023-10-26 ENCOUNTER — HOSPITAL ENCOUNTER (EMERGENCY)
Facility: CLINIC | Age: 68
Discharge: LEFT BEFORE DISCHARGE | End: 2023-10-26
Admitting: EMERGENCY MEDICINE
Payer: COMMERCIAL

## 2023-10-26 VITALS
HEART RATE: 80 BPM | OXYGEN SATURATION: 95 % | WEIGHT: 125 LBS | RESPIRATION RATE: 20 BRPM | BODY MASS INDEX: 19.01 KG/M2 | TEMPERATURE: 97.1 F | DIASTOLIC BLOOD PRESSURE: 67 MMHG | SYSTOLIC BLOOD PRESSURE: 118 MMHG

## 2023-10-26 LAB
ANION GAP SERPL CALCULATED.3IONS-SCNC: 11 MMOL/L (ref 7–15)
BASOPHILS # BLD AUTO: 0 10E3/UL (ref 0–0.2)
BASOPHILS NFR BLD AUTO: 1 %
BUN SERPL-MCNC: 5.9 MG/DL (ref 8–23)
CALCIUM SERPL-MCNC: 8.5 MG/DL (ref 8.8–10.2)
CHLORIDE SERPL-SCNC: 104 MMOL/L (ref 98–107)
CREAT SERPL-MCNC: 0.65 MG/DL (ref 0.51–0.95)
DEPRECATED HCO3 PLAS-SCNC: 26 MMOL/L (ref 22–29)
EGFRCR SERPLBLD CKD-EPI 2021: >90 ML/MIN/1.73M2
EOSINOPHIL # BLD AUTO: 0.2 10E3/UL (ref 0–0.7)
EOSINOPHIL NFR BLD AUTO: 5 %
ERYTHROCYTE [DISTWIDTH] IN BLOOD BY AUTOMATED COUNT: 13.8 % (ref 10–15)
GLUCOSE SERPL-MCNC: 94 MG/DL (ref 70–99)
HCT VFR BLD AUTO: 42.2 % (ref 35–47)
HGB BLD-MCNC: 13.9 G/DL (ref 11.7–15.7)
IMM GRANULOCYTES # BLD: 0 10E3/UL
IMM GRANULOCYTES NFR BLD: 0 %
LEVETIRACETAM SERPL-MCNC: 14.1 ΜG/ML (ref 10–40)
LYMPHOCYTES # BLD AUTO: 1.8 10E3/UL (ref 0.8–5.3)
LYMPHOCYTES NFR BLD AUTO: 46 %
MCH RBC QN AUTO: 33.1 PG (ref 26.5–33)
MCHC RBC AUTO-ENTMCNC: 32.9 G/DL (ref 31.5–36.5)
MCV RBC AUTO: 101 FL (ref 78–100)
MONOCYTES # BLD AUTO: 0.4 10E3/UL (ref 0–1.3)
MONOCYTES NFR BLD AUTO: 10 %
NEUTROPHILS # BLD AUTO: 1.4 10E3/UL (ref 1.6–8.3)
NEUTROPHILS NFR BLD AUTO: 38 %
NRBC # BLD AUTO: 0 10E3/UL
NRBC BLD AUTO-RTO: 0 /100
PLATELET # BLD AUTO: 322 10E3/UL (ref 150–450)
POTASSIUM SERPL-SCNC: 4.3 MMOL/L (ref 3.4–5.3)
RBC # BLD AUTO: 4.2 10E6/UL (ref 3.8–5.2)
SODIUM SERPL-SCNC: 141 MMOL/L (ref 135–145)
WBC # BLD AUTO: 3.8 10E3/UL (ref 4–11)

## 2023-10-26 PROCEDURE — 99283 EMERGENCY DEPT VISIT LOW MDM: CPT

## 2023-10-26 PROCEDURE — 80177 DRUG SCRN QUAN LEVETIRACETAM: CPT | Performed by: STUDENT IN AN ORGANIZED HEALTH CARE EDUCATION/TRAINING PROGRAM

## 2023-10-26 PROCEDURE — 36415 COLL VENOUS BLD VENIPUNCTURE: CPT | Performed by: STUDENT IN AN ORGANIZED HEALTH CARE EDUCATION/TRAINING PROGRAM

## 2023-10-26 PROCEDURE — 85025 COMPLETE CBC W/AUTO DIFF WBC: CPT | Performed by: STUDENT IN AN ORGANIZED HEALTH CARE EDUCATION/TRAINING PROGRAM

## 2023-10-26 PROCEDURE — 80048 BASIC METABOLIC PNL TOTAL CA: CPT | Performed by: STUDENT IN AN ORGANIZED HEALTH CARE EDUCATION/TRAINING PROGRAM

## 2023-10-26 NOTE — ED TRIAGE NOTES
Had a seizure on Monday, talked with PCP yesterday and instructed to come to ER to get checked out. First seizure was one month ago. On Monday pt collapsed. Unconscious for about ten minutes. Witnessed by  boyfriend. C/o mild HA.          numerical 0-10

## 2023-10-26 NOTE — ED NOTES
PIT/Triage Evaluation    Patient presented with a seizure. She had a seizure on Monday night and was her 3rd one and came last night and lobby was full. She recently diagnosed with seizures and had a grand mal a month ago. Has been on Keppra since. She is unsure why she had a seizure and she's going to get an MRI end of November. The second seizure occurred a couple of weeks ago. On Monday her boyfriend was over and she was in kitchen and then went down. He states she was not shaking just out and was for about 5-6 minutes and was unresponsive. She was confused after and then had a gradual clearing. She states she has no indicators when it is coming on.     Exam is notable for:    Patient Vitals for the past 24 hrs:   BP Temp Temp src Pulse Resp SpO2 Weight   10/26/23 1307 118/67 97.1  F (36.2  C) Temporal 80 20 95 % 56.7 kg (125 lb)     General: Alert, No distress. Nontoxic appearance  Head: No signs of trauma.   Mouth/Throat: Oropharynx moist.   Eyes: Conjunctivae are normal. Pupils are equal..   Neck: Normal range of motion.    CV: Appears well perfused.  Resp:No respiratory distress.   MSK: Normal range of motion. No obvious deformity.   Neuro: The patient is alert and interactive. AVERY. Speech normal. GCS 15  Skin: No lesion or sign of trauma noted.   Psych: normal mood and affect. behavior is normal.     Important information for subsequent clinician:  Records indicate that the patient is currently taking 500 mg p.o. twice daily of Keppra.  I consulted with general neurology.  Dr. Parmar is recommending increasing dose from 500 twice daily to 1000 twice daily if her kidney function remains normal.  Initially, believed MRI had not been done since the onset of these seizures but the MRI was done by Dr. Whitaker in September 2023.     I briefly evaluated the patient and developed an initial plan of care. I discussed this plan and explained that this brief interaction does not constitute a full evaluation.  Patient/family understands that they should wait to be fully evaluated and discuss any test results with another clinician prior to leaving the hospital.    Scribe Disclosure:  I, Harpal Luis Angel, am serving as a scribe at 1:24 PM on 10/26/2023 to document services personally performed by Eliud Berger MD based on my observations and the provider's statements to me.     Eliud Berger MD  10/26/23 1621

## 2023-10-26 NOTE — ED NOTES
Patient stated intent to leave without being seen after receiving results through [facility].  Patient informed that though they received the results, they still need to be seen by a provider, since they are preliminary results, and the provider may need to order more tests to better evaluate the patient's medical complaint. Patient stated they understood there are risks of leaving prior to being seen.     Patient states does not want to wait & was here yesterday also.  Instructed to follow-up with PMD & to return to ER for any new or worsening symptoms.  States understanding.  Left per pedis.  Gait steady.

## 2023-11-02 ENCOUNTER — APPOINTMENT (OUTPATIENT)
Dept: URBAN - METROPOLITAN AREA CLINIC 256 | Age: 68
Setting detail: DERMATOLOGY
End: 2023-11-02

## 2023-11-02 DIAGNOSIS — L40.0 PSORIASIS VULGARIS: ICD-10-CM

## 2023-11-02 PROCEDURE — OTHER BIOLOGIC INJECTION TRAINING: OTHER

## 2023-11-02 PROCEDURE — 96372 THER/PROPH/DIAG INJ SC/IM: CPT

## 2023-11-02 PROCEDURE — OTHER MIPS QUALITY: OTHER

## 2023-11-02 PROCEDURE — OTHER SKYRIZI INJECTION: OTHER

## 2023-11-02 ASSESSMENT — LOCATION ZONE DERM: LOCATION ZONE: TRUNK

## 2023-11-02 ASSESSMENT — LOCATION DETAILED DESCRIPTION DERM: LOCATION DETAILED: LEFT LATERAL ABDOMEN

## 2023-11-02 ASSESSMENT — LOCATION SIMPLE DESCRIPTION DERM: LOCATION SIMPLE: ABDOMEN

## 2023-11-02 NOTE — PROCEDURE: BIOLOGIC INJECTION TRAINING
Stelara Training Text: We discussed Stelara injection. I demonstrated how to clean the skin and administer the medication.
Which Biologic Is The Patient Receiving Training For?: Skyrizi
Who Did You Train: The Patient
Siliq Training Text: We discussed Siliq injection. I demonstrated how to clean the skin and administer the medication.
Skyrizi Training Text: We discussed Skyrizi injection. I demonstrated how to clean the skin and administer the medication.
Humira Training Text: We discussed Humira injection. I demonstrated how to clean the skin and administer the medication.
Detail Level: Simple
Simponi Training Text: We discussed Simponi injection. I demonstrated how to clean the skin and administer the medication.
Xolair Training Text: We discussed Xolair injection. I demonstrated how to clean the skin and administer the medication.
Enbrel Training Text: We discussed Enbrel injection. I demonstrated how to clean the skin and administer the medication.
Ilumya Training Text: We discussed Ilumya injection. I demonstrated how to clean the skin and administer the medication.
Dupixent Training Text: We discussed Dupixent injection. I demonstrated how to clean the skin and administer the medication.
Cimzia Training Text: We discussed Cimzia injection. I demonstrated how to clean the skin and administer the medication.
Tremfya Training Text: We discussed Tremfya injection. I demonstrated how to clean the skin and administer the medication.
Adbry Training Text: We discussed Adbry injection. I demonstrated how to clean the skin and administer the medication.
Taltz Training Text: We discussed Taltz injection. I demonstrated how to clean the skin and administer the medication.
Cosentyx Training Text: We discussed Cosentyx injection. I demonstrated how to clean the skin and administer the medication.
[Follow-Up Visit_____] : a follow-up visit for [unfilled]

## 2023-11-05 ENCOUNTER — HEALTH MAINTENANCE LETTER (OUTPATIENT)
Age: 68
End: 2023-11-05

## 2024-02-05 ENCOUNTER — HOSPITAL ENCOUNTER (EMERGENCY)
Facility: CLINIC | Age: 69
Discharge: HOME | End: 2024-02-05
Attending: EMERGENCY MEDICINE | Admitting: EMERGENCY MEDICINE
Payer: COMMERCIAL

## 2024-02-05 ENCOUNTER — APPOINTMENT (OUTPATIENT)
Dept: CT IMAGING | Facility: CLINIC | Age: 69
End: 2024-02-05
Attending: EMERGENCY MEDICINE
Payer: COMMERCIAL

## 2024-02-05 VITALS
HEART RATE: 94 BPM | RESPIRATION RATE: 18 BRPM | TEMPERATURE: 97.4 F | OXYGEN SATURATION: 96 % | DIASTOLIC BLOOD PRESSURE: 66 MMHG | SYSTOLIC BLOOD PRESSURE: 116 MMHG

## 2024-02-05 DIAGNOSIS — F10.920 ALCOHOLIC INTOXICATION WITHOUT COMPLICATION (H): Primary | ICD-10-CM

## 2024-02-05 DIAGNOSIS — S00.83XA FOREHEAD CONTUSION, INITIAL ENCOUNTER: ICD-10-CM

## 2024-02-05 LAB
ALBUMIN SERPL BCG-MCNC: 4.5 G/DL (ref 3.5–5.2)
ALBUMIN UR-MCNC: NEGATIVE MG/DL
ALP SERPL-CCNC: 71 U/L (ref 40–150)
ALT SERPL W P-5'-P-CCNC: 32 U/L (ref 0–50)
AMPHETAMINES UR QL SCN: NORMAL
ANION GAP SERPL CALCULATED.3IONS-SCNC: 10 MMOL/L (ref 7–15)
APAP SERPL-MCNC: <5 UG/ML (ref 10–30)
APPEARANCE UR: CLEAR
AST SERPL W P-5'-P-CCNC: ABNORMAL U/L
ATRIAL RATE - MUSE: 63 BPM
BARBITURATES UR QL SCN: NORMAL
BASOPHILS # BLD AUTO: 0 10E3/UL (ref 0–0.2)
BASOPHILS NFR BLD AUTO: 1 %
BENZODIAZ UR QL SCN: NORMAL
BILIRUB SERPL-MCNC: 0.7 MG/DL
BILIRUB UR QL STRIP: NEGATIVE
BUN SERPL-MCNC: 9.1 MG/DL (ref 8–23)
BZE UR QL SCN: NORMAL
CALCIUM SERPL-MCNC: 8.9 MG/DL (ref 8.8–10.2)
CANNABINOIDS UR QL SCN: NORMAL
CHLORIDE SERPL-SCNC: 104 MMOL/L (ref 98–107)
COLOR UR AUTO: NORMAL
CREAT SERPL-MCNC: 0.65 MG/DL (ref 0.51–0.95)
DEPRECATED HCO3 PLAS-SCNC: 30 MMOL/L (ref 22–29)
DIASTOLIC BLOOD PRESSURE - MUSE: NORMAL MMHG
EGFRCR SERPLBLD CKD-EPI 2021: >90 ML/MIN/1.73M2
EOSINOPHIL # BLD AUTO: 0.2 10E3/UL (ref 0–0.7)
EOSINOPHIL NFR BLD AUTO: 2 %
ERYTHROCYTE [DISTWIDTH] IN BLOOD BY AUTOMATED COUNT: 12.7 % (ref 10–15)
ETHANOL SERPL-MCNC: 0.27 G/DL
FENTANYL UR QL: NORMAL
GLUCOSE SERPL-MCNC: 99 MG/DL (ref 70–99)
GLUCOSE UR STRIP-MCNC: NEGATIVE MG/DL
HCT VFR BLD AUTO: 43.6 % (ref 35–47)
HGB BLD-MCNC: 14.3 G/DL (ref 11.7–15.7)
HGB UR QL STRIP: NEGATIVE
HOLD SPECIMEN: NORMAL
IMM GRANULOCYTES # BLD: 0 10E3/UL
IMM GRANULOCYTES NFR BLD: 0 %
INTERPRETATION ECG - MUSE: NORMAL
KETONES UR STRIP-MCNC: NEGATIVE MG/DL
LEUKOCYTE ESTERASE UR QL STRIP: NEGATIVE
LYMPHOCYTES # BLD AUTO: 2.4 10E3/UL (ref 0.8–5.3)
LYMPHOCYTES NFR BLD AUTO: 36 %
MAGNESIUM SERPL-MCNC: 2.5 MG/DL (ref 1.7–2.3)
MCH RBC QN AUTO: 32.4 PG (ref 26.5–33)
MCHC RBC AUTO-ENTMCNC: 32.8 G/DL (ref 31.5–36.5)
MCV RBC AUTO: 99 FL (ref 78–100)
MONOCYTES # BLD AUTO: 0.6 10E3/UL (ref 0–1.3)
MONOCYTES NFR BLD AUTO: 9 %
NEUTROPHILS # BLD AUTO: 3.4 10E3/UL (ref 1.6–8.3)
NEUTROPHILS NFR BLD AUTO: 52 %
NITRATE UR QL: NEGATIVE
NRBC # BLD AUTO: 0 10E3/UL
NRBC BLD AUTO-RTO: 0 /100
OPIATES UR QL SCN: NORMAL
P AXIS - MUSE: 55 DEGREES
PCP QUAL URINE (ROCHE): NORMAL
PH UR STRIP: 5.5 [PH] (ref 5–7)
PLATELET # BLD AUTO: 339 10E3/UL (ref 150–450)
POTASSIUM SERPL-SCNC: 4.5 MMOL/L (ref 3.4–5.3)
PR INTERVAL - MUSE: 184 MS
PROT SERPL-MCNC: 7.9 G/DL (ref 6.4–8.3)
QRS DURATION - MUSE: 86 MS
QT - MUSE: 428 MS
QTC - MUSE: 437 MS
R AXIS - MUSE: 78 DEGREES
RBC # BLD AUTO: 4.41 10E6/UL (ref 3.8–5.2)
RBC URINE: <1 /HPF
SALICYLATES SERPL-MCNC: <0.3 MG/DL
SODIUM SERPL-SCNC: 144 MMOL/L (ref 135–145)
SP GR UR STRIP: 1 (ref 1–1.03)
SYSTOLIC BLOOD PRESSURE - MUSE: NORMAL MMHG
T AXIS - MUSE: 70 DEGREES
TROPONIN T SERPL HS-MCNC: 7 NG/L
TROPONIN T SERPL HS-MCNC: 7 NG/L
UROBILINOGEN UR STRIP-MCNC: NORMAL MG/DL
VENTRICULAR RATE- MUSE: 63 BPM
WBC # BLD AUTO: 6.6 10E3/UL (ref 4–11)
WBC URINE: <1 /HPF

## 2024-02-05 PROCEDURE — 80143 DRUG ASSAY ACETAMINOPHEN: CPT | Performed by: EMERGENCY MEDICINE

## 2024-02-05 PROCEDURE — 70450 CT HEAD/BRAIN W/O DYE: CPT

## 2024-02-05 PROCEDURE — 36415 COLL VENOUS BLD VENIPUNCTURE: CPT | Performed by: EMERGENCY MEDICINE

## 2024-02-05 PROCEDURE — 85004 AUTOMATED DIFF WBC COUNT: CPT | Performed by: EMERGENCY MEDICINE

## 2024-02-05 PROCEDURE — 80179 DRUG ASSAY SALICYLATE: CPT | Performed by: EMERGENCY MEDICINE

## 2024-02-05 PROCEDURE — 84155 ASSAY OF PROTEIN SERUM: CPT | Performed by: EMERGENCY MEDICINE

## 2024-02-05 PROCEDURE — 81001 URINALYSIS AUTO W/SCOPE: CPT | Performed by: EMERGENCY MEDICINE

## 2024-02-05 PROCEDURE — 84075 ASSAY ALKALINE PHOSPHATASE: CPT | Performed by: EMERGENCY MEDICINE

## 2024-02-05 PROCEDURE — 99285 EMERGENCY DEPT VISIT HI MDM: CPT | Mod: 25

## 2024-02-05 PROCEDURE — 84484 ASSAY OF TROPONIN QUANT: CPT | Mod: 91 | Performed by: EMERGENCY MEDICINE

## 2024-02-05 PROCEDURE — 93005 ELECTROCARDIOGRAM TRACING: CPT

## 2024-02-05 PROCEDURE — 80307 DRUG TEST PRSMV CHEM ANLYZR: CPT | Performed by: EMERGENCY MEDICINE

## 2024-02-05 PROCEDURE — 83735 ASSAY OF MAGNESIUM: CPT | Performed by: EMERGENCY MEDICINE

## 2024-02-05 PROCEDURE — 82077 ASSAY SPEC XCP UR&BREATH IA: CPT | Performed by: EMERGENCY MEDICINE

## 2024-02-05 ASSESSMENT — LIFESTYLE VARIABLES
TREMOR: NO TREMOR
AUDITORY DISTURBANCES: NOT PRESENT
PAROXYSMAL SWEATS: NO SWEAT VISIBLE
TOTAL SCORE: 1
ANXIETY: NO ANXIETY, AT EASE
HEADACHE, FULLNESS IN HEAD: NOT PRESENT
NAUSEA AND VOMITING: NO NAUSEA AND NO VOMITING
ORIENTATION AND CLOUDING OF SENSORIUM: CANNOT DO SERIAL ADDITIONS OR IS UNCERTAIN ABOUT DATE
VISUAL DISTURBANCES: NOT PRESENT
AGITATION: NORMAL ACTIVITY

## 2024-02-05 ASSESSMENT — ACTIVITIES OF DAILY LIVING (ADL)
ADLS_ACUITY_SCORE: 36
ADLS_ACUITY_SCORE: 36

## 2024-02-06 ASSESSMENT — ENCOUNTER SYMPTOMS
NECK PAIN: 0
WEAKNESS: 0
SHORTNESS OF BREATH: 0
NAUSEA: 0
VOMITING: 0
DYSURIA: 0
NUMBNESS: 0
BACK PAIN: 0
SEIZURES: 0
RHINORRHEA: 0
FEVER: 0
ABDOMINAL PAIN: 0
HEMATURIA: 0
COUGH: 0
DIZZINESS: 0
HEADACHES: 0
CHILLS: 0

## 2024-02-06 NOTE — ED PROVIDER NOTES
History     Chief Complaint:  Alcohol Problem       The history is provided by the patient and the EMS personnel.      Megan Bettencourt is a 68 year old female with a history of seizures who presents with an alcohol problem. Initial report by EMS per RN was that patient may have been found driving erratically on the street and EMS/PD was called by bystander. However, the patient reports that she does not have a 's license and has not driven in years nor does she own a car due to a seizure disorder history for which she is compliant with her medications. She is adamant that she did not drive and that she walks everywhere as exercise. She adds that she was actually seen today by her physician today for a separate issue. Denies seizure episode today. She denies any pain anywhere. She reports she drinks a glass of wine or two every night and denies history of alcohol withdrawal seizures. She takes seizure medications every morning and is not on blood thinners. She denies any thoughts of self harm or hurting others.     Review of Systems   Constitutional:  Negative for chills and fever.   HENT:  Negative for congestion and rhinorrhea.    Respiratory:  Negative for cough and shortness of breath.    Cardiovascular:  Negative for chest pain.   Gastrointestinal:  Negative for abdominal pain, nausea and vomiting.   Genitourinary:  Negative for dysuria and hematuria.   Musculoskeletal:  Negative for back pain and neck pain.   Neurological:  Negative for dizziness, seizures, syncope, weakness, numbness and headaches.       Independent Historian:   EMS - They report that the patient was driving eratically which prompted bystanders to call the police. They add that the patient is very intoxicated. She has a bump on her head and does not remember how it happened.      Review of External Notes:   Patient's tetanus is up to date.   Office visit note from today - On chart review, it would appear patient takes Vimpat for  seizure disorder      Medications:    Amphetamine-dextroamphetamine  Diazepam  Ipratropium  Lorazepam  Metoprolol succinate ER  Venlafaxine  Lacosamide    Past Medical History:    Anxiety  Cervical stenosis of spine  Pelvic fracture  Thyroid disease  Vision disturbance    Past Surgical History:    Breast surgery   section  Cosmetic blepharoplasty lower lids bilateral  Cosmetic surgery, neck lift  Orthopedic surgery  Repair ptosis bilateral    Physical Exam   Patient Vitals for the past 24 hrs:   BP Temp Temp src Pulse Resp SpO2   24 2207 116/66 -- -- 94 18 96 %   24 1953 (!) 148/100 97.4  F (36.3  C) Oral 71 18 99 %        Constitutional:       General: Not in acute distress.     Appearance: Normal appearance  HENT:      Head: Normocephalic.  Left forehead contusion/abrasion of unknown age.  No facial bone tenderness to palpation.  No pain with jaw clenching.  No evidence of raccoon's eyes or [place] sign.  No septal hematoma.  Eyes:     Extraocular Movements: Extraocular movements intact.      Conjunctiva/sclera: Conjunctivae normal.      Pupils: Pupils are equal, round, and reactive to light.   Cardiovascular:     Rate and Rhythm: Normal rate and regular rhythm.   Pulmonary:      Effort: Pulmonary effort is normal.      Breath sounds: Normal breath sounds.   Abdominal:      General: Abdomen is flat. There is no distension.      Palpations: Abdomen is soft.      Tenderness: There is no abdominal tenderness.   Musculoskeletal:      Cervical back: Normal range of motion and neck supple. No rigidity.  No midline cervical spine tenderness to palpation.     General: No swelling or deformity.  No midline T/L-spine tenderness to palpation.  No chest wall tenderness to palpation.  Normal range of motion of all 4 extremities without tenderness.  No other lines of trauma.  Skin:     General: Skin is warm and dry.   Neurological:      General: No focal deficit present.     Mental Status: Alert and answering  questions appropriately.  Intoxicated.  Psychiatric:         Mood and Affect: Mood normal.         Behavior: Behavior normal.    Emergency Department Course   ECG  ECG results from 02/05/24   EKG 12-lead, tracing only     Value    Systolic Blood Pressure     Diastolic Blood Pressure     Ventricular Rate 63    Atrial Rate 63    KS Interval 184    QRS Duration 86        QTc 437    P Axis 55    R AXIS 78    T Axis 70    Interpretation ECG      Sinus rhythm  Low voltage QRS  Nonspecific ST abnormality  Abnormal ECG  When compared with ECG of 19-JUN-2023 12:54,  Premature supraventricular complexes are no longer Present  Vent. rate has decreased BY  36 BPM  QRS axis Shifted right  Confirmed by GENERATED REPORT, COMPUTER ([id number]),  Aasen, Bradley ([id number]) on 2/5/2024 8:55:21 PM        See ED course for independent interpretation.     Imaging:  CT Head w/o Contrast   Final Result   IMPRESSION:   1.  No CT evidence for acute intracranial process.   2.  Brain atrophy and presumed chronic microvascular ischemic changes as above.         Report per radiology    Laboratory:  Labs Ordered and Resulted from Time of ED Arrival to Time of ED Departure   MAGNESIUM - Abnormal       Result Value    Magnesium 2.5 (*)    COMPREHENSIVE METABOLIC PANEL - Abnormal    Sodium 144      Potassium 4.5      Carbon Dioxide (CO2) 30 (*)     Anion Gap 10      Urea Nitrogen 9.1      Creatinine 0.65      GFR Estimate >90      Calcium 8.9      Chloride 104      Glucose 99      Alkaline Phosphatase 71      AST        ALT 32      Protein Total 7.9      Albumin 4.5      Bilirubin Total 0.7     ETHYL ALCOHOL LEVEL - Abnormal    Alcohol ethyl 0.27 (*)    ACETAMINOPHEN LEVEL - Abnormal    Acetaminophen <5.0 (*)    TROPONIN T, HIGH SENSITIVITY - Normal    Troponin T, High Sensitivity 7     ROUTINE UA WITH MICROSCOPIC REFLEX TO CULTURE - Normal    Color Urine Straw      Appearance Urine Clear      Glucose Urine Negative      Bilirubin Urine Negative       Ketones Urine Negative      Specific Gravity Urine 1.003      Blood Urine Negative      pH Urine 5.5      Protein Albumin Urine Negative      Urobilinogen Urine Normal      Nitrite Urine Negative      Leukocyte Esterase Urine Negative      RBC Urine <1      WBC Urine <1     SALICYLATE LEVEL - Normal    Salicylate <0.3     URINE DRUG SCREEN PANEL - Normal    Amphetamines Urine Screen Negative      Barbituates Urine Screen Negative      Benzodiazepine Urine Screen Negative      Cannabinoids Urine Screen Negative      Cocaine Urine Screen Negative      Fentanyl Qual Urine Screen Negative      Opiates Urine Screen Negative      PCP Urine Screen Negative     TROPONIN T, HIGH SENSITIVITY - Normal    Troponin T, High Sensitivity 7     CBC WITH PLATELETS AND DIFFERENTIAL    WBC Count 6.6      RBC Count 4.41      Hemoglobin 14.3      Hematocrit 43.6      MCV 99      MCH 32.4      MCHC 32.8      RDW 12.7      Platelet Count 339      % Neutrophils 52      % Lymphocytes 36      % Monocytes 9      % Eosinophils 2      % Basophils 1      % Immature Granulocytes 0      NRBCs per 100 WBC 0      Absolute Neutrophils 3.4      Absolute Lymphocytes 2.4      Absolute Monocytes 0.6      Absolute Eosinophils 0.2      Absolute Basophils 0.0      Absolute Immature Granulocytes 0.0      Absolute NRBCs 0.0          Emergency Department Course & Assessments:    Interventions:  Medications - No data to display     Independent Interpretation (X-rays, CTs, rhythm strip):  Independent interpretation of EKG in ED course.    Assessments/Consultations/Discussion of Management or Tests:  ED Course as of 02/06/24 0316   Mon Feb 05, 2024 2011 I examined the patient and obtained history   2017 EKG 12-lead, tracing only  On my independent interpretation of EKG, normal sinus rhythm rate of 63.  Normal SD and QRS.  Normal QTc.  No acute ST elevation or depression as compared with 6/19/2023 EKG.   2101 Alcohol ethyl(!): 0.27   2112 CT Head w/o  Contrast  No ICH on formal   2252 Notified by RN that patient tolerated ambulation trial without difficulty.  Patient is clinically sober without evidence of withdrawal.  Patient understands that she is brought in to the ER today due to alcohol use.   2259 On my reevaluation of the patient, patient awake alert and oriented x 4.  Patient admits to having alcoholic drink today at restaurant roughly a block away from her doctor's office today for which she was being evaluated for a different problem.  Patient states that she walks everywhere normally and has not driven in 5 years and does not own a car.  She is uncertain why it was told that by EMS that she was driving erratically.  Suspect this may be a misunderstanding per EMS report.  Patient states that she lives roughly 1 mile away from the restaurant and normally walks there as an exercise.  Patient states that she was at the restaurant, then subsequently went to her doctor's office, and went back to a restaurant afterwards and does endorse drinking a few alcoholic drinks after her appointment.  Patient however is uncertain how she got the bump on her left-sided forehead. Suspect possible intoxicated fall. Patient however has no complaints at this time.  Imaging negative.  Patient lives with a significant other and does not want to wake him.  Instead, patient will contact her sister to come pick her up to take her home.  Patient denies any SI, HI, hallucinations.  At this time, workup negative for acute illness.  Patient is alert, awake, and clinically sober.  No evidence of withdrawal, will discharge patient home.  Discussed strict return precautions.  Answered all questions.  Patient voiced understanding and agreement with plan.   2310 Patient's sister notified and on her way to pick the patient up to take her home.       Social Determinants of Health affecting care:   None    Disposition:  The patient was discharged to home.     Impression & Plan      Medical  "Decision Makin-year-old female as described above presents to the emergency department for alcohol intoxication and erratic driving.  Patient hemodynamically stable at time evaluation.  Afebrile.  Patient does appear intoxicated at time of evaluation.  Patient has a left frontal scalp hematoma, but does not know how this occurred.  Patient otherwise has no complaints at this time.  He reports a history of seizure disorder that she takes medication for but does not know what medication.  Denies history of alcohol withdrawal seizure.  Denies SI, HI, or hallucinations at this time. Adamant that she does not drive as she has a seizure disorder and does not have a license, on chart review, patient does have documentation of seizure history and take Vimpat, suspect possibly misunderstanding during EMS report. On RN note, patient was stated to have been \"acting erratically on the road.\" No reported history of MVC. Will obtain CT head without contrast for evaluation for ICH likely secondary to recent intoxicated fall.  Seizure/intoxication workup.  [facility] protocol.  Pending reevaluation once clinically sober.  Discussed care plan with patient who voiced understanding and agreement with plan.  Answered all questions.  Additional workup and orders as listed in chart.     Please refer to ED course above as part of continuation of MDM for details on the patient's treatment course and any changes or updates in care plan beyond my initial evaluation and MDM creation.            Diagnosis:    ICD-10-CM    1. Alcoholic intoxication without complication (H24)  F10.920       2. Forehead contusion, initial encounter  S00.83XA            Discharge Medications:  New Prescriptions    No medications on file          Scribe Disclosure:  I, Ruel Salter, am serving as a scribe at 8:20 PM on 2024 to document services personally performed by Winston Malin DO based on my observations and the provider's statements to me.     2024 "   Winston Malin DO Yeh, Ferris, DO  02/06/24 032

## 2024-02-06 NOTE — ED NOTES
Patient's belongings, including cell phone and reading glasses, placed in room locker. Patient informed she is on a 12 hour ASHLEY. Pt wants to leave, but laughing when she finds that she cannot. This Writer explained to pt that she must be cleared for discharge by MD. She is agreeable to this, and cooperative at this time. Pt allows lab draw. Pt oriented to self and year, unable to state correct month.

## 2024-02-06 NOTE — ED NOTES
Pt ambulates in hallway, steady gait. Pt denies nausea, tolerates PO fluids well. Pt getting dressed, eager to go home. Alert, pleasant and cooperative.

## 2024-02-06 NOTE — ED TRIAGE NOTES
[facility]  ED Arrival Note      Means of Arrival: EMS  Comes from: the community (outside of the hospital)    Story: By standard called 911 as patient was acting erratically in the road. Per EMS, patient was a poor historian with police, appears intoxicated to PD. Patient has a bump in the head but does not recall how it happened. Patient is belligerent but agreeable with repeated redirection. Arrived on a hold, hold continued.         EMS/PD Interventions: [facility]  EMS Medications: N/A    Meets Stroke Criteria? No  Meets Trauma Criteria? No  Shock Index: <0.8      Directed to: Main ED  Belongings: Remain with patient           Triage Assessment (Adult)       Row Name 02/05/24 1956          Triage Assessment    Airway WDL WDL        Respiratory WDL    Respiratory WDL WDL        Skin Circulation/Temperature WDL    Skin Circulation/Temperature WDL WDL        Cardiac WDL    Cardiac WDL WDL        Cognitive/Neuro/Behavioral WDL    Cognitive/Neuro/Behavioral WDL X

## 2024-02-06 NOTE — ED NOTES
Pt asks if she should call her [employer] . Pt reminded she is on a ASHLEY and has not been cleared to discharge by MD. Pt lies back down in bed. Denies needs. Does not want PO fluids at this time. Call light within reach.

## 2024-06-19 ENCOUNTER — HOSPITAL ENCOUNTER (EMERGENCY)
Facility: CLINIC | Age: 69
Discharge: HOME | End: 2024-06-19
Attending: EMERGENCY MEDICINE | Admitting: EMERGENCY MEDICINE
Payer: COMMERCIAL

## 2024-06-19 ENCOUNTER — APPOINTMENT (OUTPATIENT)
Dept: CT IMAGING | Facility: CLINIC | Age: 69
End: 2024-06-19
Attending: EMERGENCY MEDICINE
Payer: COMMERCIAL

## 2024-06-19 VITALS
HEART RATE: 85 BPM | TEMPERATURE: 97.8 F | DIASTOLIC BLOOD PRESSURE: 87 MMHG | OXYGEN SATURATION: 95 % | RESPIRATION RATE: 16 BRPM | SYSTOLIC BLOOD PRESSURE: 123 MMHG

## 2024-06-19 DIAGNOSIS — F10.920 ALCOHOLIC INTOXICATION WITHOUT COMPLICATION (H): ICD-10-CM

## 2024-06-19 DIAGNOSIS — R53.1 WEAKNESS: ICD-10-CM

## 2024-06-19 LAB
ALBUMIN SERPL BCG-MCNC: 4.4 G/DL (ref 3.5–5.2)
ALBUMIN UR-MCNC: NEGATIVE MG/DL
ALP SERPL-CCNC: 103 U/L (ref 40–150)
ALT SERPL W P-5'-P-CCNC: 37 U/L (ref 0–50)
ANION GAP SERPL CALCULATED.3IONS-SCNC: 13 MMOL/L (ref 7–15)
APPEARANCE UR: CLEAR
AST SERPL W P-5'-P-CCNC: 59 U/L (ref 0–45)
BASOPHILS # BLD AUTO: 0 10E3/UL (ref 0–0.2)
BASOPHILS NFR BLD AUTO: 0 %
BILIRUB SERPL-MCNC: 0.6 MG/DL
BILIRUB UR QL STRIP: NEGATIVE
BUN SERPL-MCNC: 12.3 MG/DL (ref 8–23)
CALCIUM SERPL-MCNC: 9.6 MG/DL (ref 8.8–10.2)
CHLORIDE SERPL-SCNC: 98 MMOL/L (ref 98–107)
COLOR UR AUTO: NORMAL
CREAT SERPL-MCNC: 0.67 MG/DL (ref 0.51–0.95)
DEPRECATED HCO3 PLAS-SCNC: 29 MMOL/L (ref 22–29)
EGFRCR SERPLBLD CKD-EPI 2021: >90 ML/MIN/1.73M2
EOSINOPHIL # BLD AUTO: 0.4 10E3/UL (ref 0–0.7)
EOSINOPHIL NFR BLD AUTO: 6 %
ERYTHROCYTE [DISTWIDTH] IN BLOOD BY AUTOMATED COUNT: 12.7 % (ref 10–15)
ETHANOL SERPL-MCNC: 0.27 G/DL
GLUCOSE SERPL-MCNC: 98 MG/DL (ref 70–99)
GLUCOSE UR STRIP-MCNC: NEGATIVE MG/DL
HCT VFR BLD AUTO: 43.1 % (ref 35–47)
HGB BLD-MCNC: 15 G/DL (ref 11.7–15.7)
HGB UR QL STRIP: NEGATIVE
HOLD SPECIMEN: NORMAL
IMM GRANULOCYTES # BLD: 0 10E3/UL
IMM GRANULOCYTES NFR BLD: 0 %
KETONES UR STRIP-MCNC: NEGATIVE MG/DL
LEUKOCYTE ESTERASE UR QL STRIP: NEGATIVE
LYMPHOCYTES # BLD AUTO: 2.6 10E3/UL (ref 0.8–5.3)
LYMPHOCYTES NFR BLD AUTO: 40 %
MAGNESIUM SERPL-MCNC: 2.4 MG/DL (ref 1.7–2.3)
MCH RBC QN AUTO: 33.9 PG (ref 26.5–33)
MCHC RBC AUTO-ENTMCNC: 34.8 G/DL (ref 31.5–36.5)
MCV RBC AUTO: 97 FL (ref 78–100)
MONOCYTES # BLD AUTO: 0.9 10E3/UL (ref 0–1.3)
MONOCYTES NFR BLD AUTO: 13 %
NEUTROPHILS # BLD AUTO: 2.7 10E3/UL (ref 1.6–8.3)
NEUTROPHILS NFR BLD AUTO: 41 %
NITRATE UR QL: NEGATIVE
NRBC # BLD AUTO: 0 10E3/UL
NRBC BLD AUTO-RTO: 0 /100
PH UR STRIP: 6 [PH] (ref 5–7)
PLATELET # BLD AUTO: 233 10E3/UL (ref 150–450)
POTASSIUM SERPL-SCNC: 4.4 MMOL/L (ref 3.4–5.3)
PROT SERPL-MCNC: 7.5 G/DL (ref 6.4–8.3)
RBC # BLD AUTO: 4.43 10E6/UL (ref 3.8–5.2)
RBC URINE: 0 /HPF
SODIUM SERPL-SCNC: 140 MMOL/L (ref 135–145)
SP GR UR STRIP: 1 (ref 1–1.03)
T4 FREE SERPL-MCNC: 1.31 NG/DL (ref 0.9–1.7)
TSH SERPL DL<=0.005 MIU/L-ACNC: 6.49 UIU/ML (ref 0.3–4.2)
UROBILINOGEN UR STRIP-MCNC: NORMAL MG/DL
WBC # BLD AUTO: 6.5 10E3/UL (ref 4–11)
WBC URINE: <1 /HPF

## 2024-06-19 PROCEDURE — 82077 ASSAY SPEC XCP UR&BREATH IA: CPT | Performed by: EMERGENCY MEDICINE

## 2024-06-19 PROCEDURE — 85025 COMPLETE CBC W/AUTO DIFF WBC: CPT | Performed by: EMERGENCY MEDICINE

## 2024-06-19 PROCEDURE — 80053 COMPREHEN METABOLIC PANEL: CPT | Performed by: EMERGENCY MEDICINE

## 2024-06-19 PROCEDURE — 83735 ASSAY OF MAGNESIUM: CPT | Performed by: EMERGENCY MEDICINE

## 2024-06-19 PROCEDURE — 258N000003 HC RX IP 258 OP 636: Mod: JZ | Performed by: EMERGENCY MEDICINE

## 2024-06-19 PROCEDURE — 99284 EMERGENCY DEPT VISIT MOD MDM: CPT | Mod: 25

## 2024-06-19 PROCEDURE — 36415 COLL VENOUS BLD VENIPUNCTURE: CPT | Performed by: EMERGENCY MEDICINE

## 2024-06-19 PROCEDURE — 70450 CT HEAD/BRAIN W/O DYE: CPT

## 2024-06-19 PROCEDURE — 84443 ASSAY THYROID STIM HORMONE: CPT | Performed by: EMERGENCY MEDICINE

## 2024-06-19 PROCEDURE — 96360 HYDRATION IV INFUSION INIT: CPT

## 2024-06-19 PROCEDURE — 81001 URINALYSIS AUTO W/SCOPE: CPT | Performed by: EMERGENCY MEDICINE

## 2024-06-19 PROCEDURE — 84439 ASSAY OF FREE THYROXINE: CPT | Performed by: EMERGENCY MEDICINE

## 2024-06-19 RX ADMIN — SODIUM CHLORIDE 1000 ML: 9 INJECTION, SOLUTION INTRAVENOUS at 04:54

## 2024-06-19 ASSESSMENT — ACTIVITIES OF DAILY LIVING (ADL)
ADLS_ACUITY_SCORE: 36

## 2024-06-19 NOTE — ED TRIAGE NOTES
BIBA from home due to dizziness,generalized weakness,unstaedy gait since 4 days ago.  Had a sip of gin tonight as reported.  H/o seizure.  Vitally stable,alert,no chest pain nor headache.stated having brain fog ,had covid 3 years ago.

## 2024-06-19 NOTE — DISCHARGE INSTRUCTIONS
Please stop drinking alcohol.  This is harmful to your health.  Return to the ED if you want help with your alcohol use.  Follow up with your PCP in 1 week.      Please follow up with PCP about ongoing weakness and recheck your thyroid function     Discharge Instructions  Alcohol Intoxication    You have been seen today with alcohol intoxication. This means that you have enough alcohol in your system to impair your ability to mentally and physically function, perhaps to the extent that you were unable to care for yourself.    Generally, every Emergency Department visit should have a follow-up clinic visit with either a primary or a specialty clinic/provider. Please follow-up as instructed by your emergency provider today.    You may have come to the Emergency Department because of your intoxication, or for another reason, such as because of an injury. No matter what the case is, this visit is a  red flag  regarding alcohol use, and you should consider whether your drinking pattern is a problem for you.     You may be at risk for alcohol-related problems if:    Men: you drink more than 14 drinks per week, or more than 4 drinks per occasion.    Women: you drink more than 7 drinks per week or more than 3 drinks per occasion.    You have black-outs.  You do things you regret while drinking.  You have legal problems because of drinking.  You have job problems because of drinking (you call in sick to work because of drinking).    CAGE Questions  Have you ever felt you should cut down on your drinking?  Have people annoyed you by criticizing your drinking?  Have you ever felt bad or guilty about your drinking?  Have you ever had a drink first thing in the morning to steady your nerves or get rid of a hangover (eye opener)?    If you answer yes to any of the CAGE questions, you may have a problem with alcohol.      Return to the Emergency Department if:  You become shaky or tremble when you try to stop drinking.   You have  severe abdominal pain (belly pain).   You have a seizure or pass out.    You vomit (throw up) blood or have blood in your stool. This may be bright red or it may look like black coffee grounds.  You become lightheaded or faint.      For further help, contact:   Your caregiver.    Alcoholics Anonymous (AA).    [facility] Intergroup: [phone]  [facility] Central Office: [phone]   A drug or alcohol rehabilitation program.    You can get information on alcohol resources and groups by calling the number 211 or 1-184.736.6588 on any phone.     Seek medical care if:  You have persistent vomiting.   You have persistent pain in any part of your body.    You do not feel better after a few days.    If you were given a prescription for medicine here today, be sure to read all of the information (including the package insert) that comes with your prescription.  This will include important information about the medicine, its side effects, and any warnings that you need to know about.  The pharmacist who fills the prescription can provide more information and answer questions you may have about the medicine.  If you have questions or concerns that the pharmacist cannot address, please call or return to the Emergency Department.   Remember that you can always come back to the Emergency Department if you are not able to see your regular doctor in the amount of time listed above, if you get any new symptoms, or if there is anything that worries you.

## 2024-06-19 NOTE — ED PROVIDER NOTES
"  Emergency Department Note      History of Present Illness     Chief Complaint  Dizziness, Generalized Weakness, and Alcohol Intoxication    HPI  Megan Bettencourt is a 69 year old female with history of focal seizures, Gilbert syndrome, hypothyroidism, hypertension, and hyperlipidemia who presents to the ED via EMS for evaluation of dizziness and generalized weakness. Patient's significant other reports Megan has been sleeping 16 hours a day, and has been delirious, \"fuzzy in the head,\" and unstable for the past 3 days. Denies recent falls. Megan reports generalized weakness, feeling \"goofy\" and not clear headed. Notes shakiness and fuzzy vision. She was vomiting. States she had a \"small sip\" of alcohol tonight but no other substances. She is not dizzy currently. No chest pain, shortness of breath, numbness or tingling in the extremities, cough, or history of alcohol withdrawal. Of note, patient's significant other reports Megan quit drinking alcohol 4 weeks ago. Notes a history of grand mal seizure 8/2023 and multiple smaller seizures since then. She had vertigo about one month prior which has since resolved. Patient is not compliant with seizure or blood pressure medications. Estimates she last took her seizure medication a couple of months ago.         Independent Historian  Significant other as detailed above.    Review of External Notes  I reviewed 6/18/24 Telephone Encounter.   4/15/24 Office Visit reviewed regarding patient's medication noncompliance.   Past Medical History   Medical History and Problem List  CAMILA  Cervical stenosis   Hypothyroidism   Pneumothorax  Alcohol withdrawal syndrome  ADD  ADHD  Carpal tunnel syndrome  Aortic ectasia   Dilatation of aorta   Focal seizure   Homocystinemia  Homocystinuria   Hyperlipidemia   Epilepsy  Neuropathy  Hypertension  Insomnia   MDD  Gilbert syndrome   Adjustment disorder     Medications  Adderall  Valium  Ativan  Toprol XL  Effexor   Antivert     Surgical " History   Breast augmentation   section   Cosmetic blepharoplasty lower lids bilateral  Neck lift  Bilateral wrists and right heel ORIF  Repair ptosis bilateral   Breast biopsy   Carpal tunnel release   Colposcopy   Physical Exam   Patient Vitals for the past 24 hrs:   BP Temp Temp src Pulse Resp SpO2   24 0616 -- -- -- -- 16 95 %   24 0540 -- -- -- -- 15 95 %   24 0455 123/87 -- -- 85 18 91 %   24 0331 122/73 97.8  F (36.6  C) Temporal 100 14 95 %     Physical Exam    General: Resting on the bed.  Head: No obvious trauma to head.  Ears, Nose, Throat:  External ears normal.  Nose normal.    Eyes:  Conjunctivae clear.  Pupils are equal, round, and reactive.   Neck: Normal range of motion.  Neck supple.   CV: Regular rate and rhythm.  No murmurs.      Respiratory: Effort normal and breath sounds normal.  No wheezing or crackles.   Gastrointestinal: Soft.  No distension. There is no tenderness.  There is no rigidity, no rebound and no guarding.   Musculoskeletal: Normal range of motion.  Non tender extremities to palpations.    Neuro: Alert. Moving all extremities appropriately.  Normal speech.  CN II-XII grossly intact, no pronator drift, normal finger-nose-finger, visual fields intact.  Gross muscle strength intact of the proximal and distal bilateral upper and lower extremities.  Sensation intact to light touch in all 4 extremities.  Normal gait.    Skin: Skin is warm and dry.  No rash noted.   Psych: Normal mood and affect. Behavior is normal.      Diagnostics   Lab Results   Labs Ordered and Resulted from Time of ED Arrival to Time of ED Departure   COMPREHENSIVE METABOLIC PANEL - Abnormal       Result Value    Sodium 140      Potassium 4.4      Carbon Dioxide (CO2) 29      Anion Gap 13      Urea Nitrogen 12.3      Creatinine 0.67      GFR Estimate >90      Calcium 9.6      Chloride 98      Glucose 98      Alkaline Phosphatase 103      AST 59 (*)     ALT 37      Protein Total 7.5       Albumin 4.4      Bilirubin Total 0.6     ETHYL ALCOHOL LEVEL - Abnormal    Alcohol ethyl 0.27 (*)    CBC WITH PLATELETS AND DIFFERENTIAL - Abnormal    WBC Count 6.5      RBC Count 4.43      Hemoglobin 15.0      Hematocrit 43.1      MCV 97      MCH 33.9 (*)     MCHC 34.8      RDW 12.7      Platelet Count 233      % Neutrophils 41      % Lymphocytes 40      % Monocytes 13      % Eosinophils 6      % Basophils 0      % Immature Granulocytes 0      NRBCs per 100 WBC 0      Absolute Neutrophils 2.7      Absolute Lymphocytes 2.6      Absolute Monocytes 0.9      Absolute Eosinophils 0.4      Absolute Basophils 0.0      Absolute Immature Granulocytes 0.0      Absolute NRBCs 0.0     MAGNESIUM - Abnormal    Magnesium 2.4 (*)    TSH WITH FREE T4 REFLEX - Abnormal    TSH 6.49 (*)    ROUTINE UA WITH MICROSCOPIC REFLEX TO CULTURE - Normal    Color Urine Straw      Appearance Urine Clear      Glucose Urine Negative      Bilirubin Urine Negative      Ketones Urine Negative      Specific Gravity Urine 1.003      Blood Urine Negative      pH Urine 6.0      Protein Albumin Urine Negative      Urobilinogen Urine Normal      Nitrite Urine Negative      Leukocyte Esterase Urine Negative      RBC Urine 0      WBC Urine <1     T4 FREE - Normal    Free T4 1.31         Imaging  CT Head w/o Contrast   Final Result   IMPRESSION:   1.  No acute intracranial abnormality or significant change compared to the prior study.              Independent Interpretation  CT Head: No intracranial hemorrhage.  ED Course    Medications Administered  Medications   sodium chloride 0.9% BOLUS 1,000 mL (0 mLs Intravenous Stopped 6/19/24 0615)       Procedures  Procedures     Discussion of Management  None    Social Determinants of Health adding to complexity of care  Medication noncompliance     ED Course  ED Course as of 06/19/24 0818   Wed Jun 19, 2024   5175 I obtained history and examined the patient as noted above.    0622 I reassesed patient.       Medical Decision Making / Diagnosis   CMS Diagnoses: None    MIPS     None    [facility]  Megan Bettencourt is a 69 year old female who presents emergency department for generalized weakness.  Vitals are stable.  Broad differential was considered include not limited to UTI, anemia, electrolyte, metabolic, renal dysfunction, thyroid disease, alcohol intoxication, intracranial hemorrhage, stroke, tumor, mass, dehydration, etc.  CBC without leukocytosis or anemia.  BMP without acute electrolyte, metabolic or renal dysfunction.  Magnesium levels normal.  Thyroid disease with screen for, TSH high but free T4 normal.  UA is unremarkable.  Alcohol level is markedly elevated 0.27.  CT head was negative for acute hemorrhage, mass, tumor.  Nonfocal neurologic exam.  No signs of acute stroke.  Patient is walking without difficulty.  Despite being intoxicated she does appear to be clinically sober and wishes to go home.  I suspect her intoxication is likely contributing to some of her weakness.  Regardless I have recommended that she follow-up with her primary doctor for ongoing weakness evaluation.  Advised cessation of alcohol use.  Discussed return precautions including worsening weakness, focal numbness or tingling, falls etc.  Patient is agreeable and was discharged home.    Disposition  The patient was discharged.     ICD-10 Codes:    ICD-10-CM    1. Weakness  R53.1       2. Alcoholic intoxication without complication (H24)  F10.920            Discharge Medications  Discharge Medication List as of 6/19/2024  7:25 AM            Scribe Disclosure:  I, Charlotte Joseph, am serving as a scribe at 4:40 AM on 6/19/2024 to document services personally performed by Shelby Limon MD based on my observations and the provider's statements to me.        Shelby Limon MD  06/19/24 5692

## 2024-06-19 NOTE — ED NOTES
Bed: ED24  Expected date:   Expected time:   Means of arrival:   Comments:  Kym 1 69F Dizziness/ weakness

## 2024-07-22 ENCOUNTER — HOSPITAL ENCOUNTER (EMERGENCY)
Facility: CLINIC | Age: 69
Discharge: HOME | End: 2024-07-23
Attending: EMERGENCY MEDICINE | Admitting: EMERGENCY MEDICINE
Payer: COMMERCIAL

## 2024-07-22 DIAGNOSIS — R41.82 ALTERED MENTAL STATUS, UNSPECIFIED ALTERED MENTAL STATUS TYPE: ICD-10-CM

## 2024-07-22 DIAGNOSIS — F10.920 ALCOHOLIC INTOXICATION WITHOUT COMPLICATION (H): ICD-10-CM

## 2024-07-22 LAB
ALBUMIN SERPL BCG-MCNC: 4.4 G/DL (ref 3.5–5.2)
ALP SERPL-CCNC: 69 U/L (ref 40–150)
ALT SERPL W P-5'-P-CCNC: 41 U/L (ref 0–50)
ANION GAP SERPL CALCULATED.3IONS-SCNC: 11 MMOL/L (ref 7–15)
AST SERPL W P-5'-P-CCNC: 55 U/L (ref 0–45)
BASOPHILS # BLD AUTO: 0.1 10E3/UL (ref 0–0.2)
BASOPHILS NFR BLD AUTO: 1 %
BILIRUB SERPL-MCNC: 0.3 MG/DL
BUN SERPL-MCNC: 11.1 MG/DL (ref 8–23)
CALCIUM SERPL-MCNC: 8.6 MG/DL (ref 8.8–10.4)
CHLORIDE SERPL-SCNC: 103 MMOL/L (ref 98–107)
CREAT SERPL-MCNC: 0.71 MG/DL (ref 0.51–0.95)
EGFRCR SERPLBLD CKD-EPI 2021: >90 ML/MIN/1.73M2
EOSINOPHIL # BLD AUTO: 0.2 10E3/UL (ref 0–0.7)
EOSINOPHIL NFR BLD AUTO: 3 %
ERYTHROCYTE [DISTWIDTH] IN BLOOD BY AUTOMATED COUNT: 14 % (ref 10–15)
ETHANOL SERPL-MCNC: 0.33 G/DL
GLUCOSE SERPL-MCNC: 92 MG/DL (ref 70–99)
HCO3 SERPL-SCNC: 29 MMOL/L (ref 22–29)
HCT VFR BLD AUTO: 44.1 % (ref 35–47)
HGB BLD-MCNC: 15.1 G/DL (ref 11.7–15.7)
HOLD SPECIMEN: NORMAL
HOLD SPECIMEN: NORMAL
IMM GRANULOCYTES # BLD: 0 10E3/UL
IMM GRANULOCYTES NFR BLD: 0 %
LYMPHOCYTES # BLD AUTO: 2.6 10E3/UL (ref 0.8–5.3)
LYMPHOCYTES NFR BLD AUTO: 47 %
MCH RBC QN AUTO: 33.8 PG (ref 26.5–33)
MCHC RBC AUTO-ENTMCNC: 34.2 G/DL (ref 31.5–36.5)
MCV RBC AUTO: 99 FL (ref 78–100)
MONOCYTES # BLD AUTO: 0.5 10E3/UL (ref 0–1.3)
MONOCYTES NFR BLD AUTO: 10 %
NEUTROPHILS # BLD AUTO: 2.1 10E3/UL (ref 1.6–8.3)
NEUTROPHILS NFR BLD AUTO: 39 %
NRBC # BLD AUTO: 0 10E3/UL
NRBC BLD AUTO-RTO: 0 /100
PLATELET # BLD AUTO: 355 10E3/UL (ref 150–450)
POTASSIUM SERPL-SCNC: 4.3 MMOL/L (ref 3.4–5.3)
PROT SERPL-MCNC: 7.5 G/DL (ref 6.4–8.3)
RBC # BLD AUTO: 4.47 10E6/UL (ref 3.8–5.2)
SODIUM SERPL-SCNC: 143 MMOL/L (ref 135–145)
WBC # BLD AUTO: 5.5 10E3/UL (ref 4–11)

## 2024-07-22 PROCEDURE — 82077 ASSAY SPEC XCP UR&BREATH IA: CPT | Performed by: EMERGENCY MEDICINE

## 2024-07-22 PROCEDURE — 80053 COMPREHEN METABOLIC PANEL: CPT | Performed by: EMERGENCY MEDICINE

## 2024-07-22 PROCEDURE — 36415 COLL VENOUS BLD VENIPUNCTURE: CPT | Performed by: EMERGENCY MEDICINE

## 2024-07-22 PROCEDURE — 99283 EMERGENCY DEPT VISIT LOW MDM: CPT

## 2024-07-22 PROCEDURE — 85004 AUTOMATED DIFF WBC COUNT: CPT | Performed by: EMERGENCY MEDICINE

## 2024-07-22 PROCEDURE — 85025 COMPLETE CBC W/AUTO DIFF WBC: CPT | Performed by: EMERGENCY MEDICINE

## 2024-07-22 ASSESSMENT — COLUMBIA-SUICIDE SEVERITY RATING SCALE - C-SSRS
6. HAVE YOU EVER DONE ANYTHING, STARTED TO DO ANYTHING, OR PREPARED TO DO ANYTHING TO END YOUR LIFE?: NO
2. HAVE YOU ACTUALLY HAD ANY THOUGHTS OF KILLING YOURSELF IN THE PAST MONTH?: NO
1. IN THE PAST MONTH, HAVE YOU WISHED YOU WERE DEAD OR WISHED YOU COULD GO TO SLEEP AND NOT WAKE UP?: NO

## 2024-07-22 ASSESSMENT — ACTIVITIES OF DAILY LIVING (ADL)
ADLS_ACUITY_SCORE: 36
ADLS_ACUITY_SCORE: 34

## 2024-07-23 VITALS
DIASTOLIC BLOOD PRESSURE: 85 MMHG | OXYGEN SATURATION: 96 % | RESPIRATION RATE: 17 BRPM | TEMPERATURE: 98 F | HEART RATE: 100 BPM | SYSTOLIC BLOOD PRESSURE: 111 MMHG

## 2024-07-23 NOTE — ED PROVIDER NOTES
Emergency Department Note      History of Present Illness     Chief Complaint   Altered Mental Status      HPI   Megan Bettencourt is a 69 year old female who presented to the ED with   Altered mental status. Patient with altered mental status.  Significant other reports that she has been sleeping for the last few days.  Today she slumped to the ground.  Not a significant fall and did not hit her head.  This prompted the visit today.  She has been eating and drinking.  No vomiting or diarrhea.  No fever.  No chest pain or shortness of breath.    Independent Historian   Significant other as detailed above.    Review of External Notes   Reviewed the emergency department visit from 2024 where she was seen for alcohol intoxication    Past Medical History     Medical History and Problem List   Past Medical History:   Diagnosis Date    Anxiety     Cervical stenosis of spine     Pelvic fracture (H)     Thyroid disease     Vision disturbance        Medications   acetaminophen (TYLENOL) 325 MG tablet  amphetamine-dextroamphetamine (ADDERALL) 10 MG tablet  diazepam (VALIUM) 5 MG tablet  ipratropium (ATROVENT) 0.03 % nasal spray  LORazepam (ATIVAN) 1 MG tablet  metoprolol succinate ER (TOPROL XL) 25 MG 24 hr tablet  venlafaxine (EFFEXOR) 37.5 MG tablet      Surgical History   Past Surgical History:   Procedure Laterality Date    BREAST SURGERY      augmentation     SECTION      COSMETIC BLEPHAROPLASTY LOWER LIDS BILATERAL  2014    Procedure: COSMETIC BLEPHAROPLASTY LOWER LIDS BILATERAL;  Surgeon: Loi Mcpherson MD;  Location: [facility]    COSMETIC SURGERY      neck lift    ORTHOPEDIC SURGERY  2017    bilateral wrists and right heel fracture    REPAIR PTOSIS BILATERAL  2014    Procedure: REPAIR PTOSIS BILATERAL;  Surgeon: Loi Mcpherson MD;  Location: [facility]     Physical Exam     Patient Vitals for the past 24 hrs:   BP Temp Temp src Pulse Resp SpO2   24 2158 116/83 98  F (36.7  C)  Temporal 112 17 97 %     Physical Exam  General: Sitting up in bed  Eyes:  The pupils are equal and round    Conjunctivae and sclerae are normal  ENT:    Atraumatic face  Neck:  Normal range of motion  CV:  Tachycardic rate, regular rhythm    Skin warm and well perfused   Resp:  Non labored breathing on room air    No tachypnea    No cough heard    Lungs clear bilaterally  GI:  Abdomen is soft, there is no rigidity    No distension    No rebound tenderness     No abdominal tenderness  MS:  Normal muscular tone  Skin:  No rash or acute skin lesions noted  Neuro:   Awake, alert.      Speech is slightly slurred    No weakness on UE/LE    Face is symmetric.     Moves all extremities equally  Psych: Normal affect.  Appropriate interactions.    Diagnostics     Lab Results   Labs Ordered and Resulted from Time of ED Arrival to Time of ED Departure   COMPREHENSIVE METABOLIC PANEL - Abnormal       Result Value    Sodium 143      Potassium 4.3      Carbon Dioxide (CO2) 29      Anion Gap 11      Urea Nitrogen 11.1      Creatinine 0.71      GFR Estimate >90      Calcium 8.6 (*)     Chloride 103      Glucose 92      Alkaline Phosphatase 69      AST 55 (*)     ALT 41      Protein Total 7.5      Albumin 4.4      Bilirubin Total 0.3     CBC WITH PLATELETS AND DIFFERENTIAL - Abnormal    WBC Count 5.5      RBC Count 4.47      Hemoglobin 15.1      Hematocrit 44.1      MCV 99      MCH 33.8 (*)     MCHC 34.2      RDW 14.0      Platelet Count 355      % Neutrophils 39      % Lymphocytes 47      % Monocytes 10      % Eosinophils 3      % Basophils 1      % Immature Granulocytes 0      NRBCs per 100 WBC 0      Absolute Neutrophils 2.1      Absolute Lymphocytes 2.6      Absolute Monocytes 0.5      Absolute Eosinophils 0.2      Absolute Basophils 0.1      Absolute Immature Granulocytes 0.0      Absolute NRBCs 0.0     ETHYL ALCOHOL LEVEL - Abnormal    Alcohol ethyl 0.33 (*)      ED Course        ED Course   Past medical records, nursing  notes, and vitals reviewed.    Medical Decision Making / Diagnosis     ANNIKA Bettencourt is a 69 year old female who presents emergency department without mental status.  Patient is intoxicated and I do think that this is cause of her mental status.  She has a nonfocal exam.  There is no head trauma.  Doubt stroke or seizures. She has not been ill.  Patient laboratory studies are unremarkable.  Significant other did not realize that she was drinking.  She must be hiding bottles of liquor or wine in home as he reports that he only has 1 bottle of wine in the fridge.  He will take all of the liquor out of the house that he is aware of.  I discussed with her that, she cannot drink a single glass of wine or liquor as it leads to this for her.  Given resources.  Significant other comfortable taking her home    Disposition   The patient was discharged.     Diagnosis     ICD-10-CM    1. Alcoholic intoxication without complication (H24)  F10.920       2. Altered mental status, unspecified altered mental status type  R41.82            MD Alicia Llanes, Mercedes Cuadra MD  07/23/24 0015

## 2024-07-23 NOTE — DISCHARGE INSTRUCTIONS

## 2024-07-23 NOTE — ED TRIAGE NOTES
"Pt's spouse reports she has been sleeping for 3 days, unable to get her up off floor of kitchen today.  Hx of seizures.  Pt reports having \"a lot of amnesia.\"  Pt's mood is labile, appears to be intoxicated.     Triage Assessment (Adult)       Row Name 07/22/24 7494          Triage Assessment    Airway WDL WDL        Respiratory WDL    Respiratory WDL WDL        Skin Circulation/Temperature WDL    Skin Circulation/Temperature WDL WDL        Cardiac WDL    Cardiac WDL WDL        Peripheral/Neurovascular WDL    Peripheral Neurovascular WDL WDL        Cognitive/Neuro/Behavioral WDL    Cognitive/Neuro/Behavioral WDL arousability;level of consciousness;mood/behavior     Level of Consciousness confused;lethargic                     "

## 2024-09-13 ENCOUNTER — HOSPITAL ENCOUNTER (EMERGENCY)
Facility: CLINIC | Age: 69
Discharge: HOME | End: 2024-09-13
Attending: EMERGENCY MEDICINE | Admitting: EMERGENCY MEDICINE
Payer: COMMERCIAL

## 2024-09-13 VITALS
SYSTOLIC BLOOD PRESSURE: 116 MMHG | BODY MASS INDEX: 21.22 KG/M2 | DIASTOLIC BLOOD PRESSURE: 66 MMHG | HEIGHT: 68 IN | TEMPERATURE: 98.8 F | OXYGEN SATURATION: 89 % | RESPIRATION RATE: 12 BRPM | WEIGHT: 140 LBS | HEART RATE: 93 BPM

## 2024-09-13 DIAGNOSIS — Z91.148 NONCOMPLIANCE WITH MEDICATION REGIMEN: ICD-10-CM

## 2024-09-13 DIAGNOSIS — F10.220 ACUTE ALCOHOLIC INTOXICATION IN ALCOHOLISM WITHOUT COMPLICATION (H): Primary | ICD-10-CM

## 2024-09-13 DIAGNOSIS — Z86.69 HX OF SEIZURE DISORDER: ICD-10-CM

## 2024-09-13 LAB — ALCOHOL BREATH TEST: 0.29 (ref 0–0.08)

## 2024-09-13 PROCEDURE — 82075 ASSAY OF BREATH ETHANOL: CPT | Mod: 91

## 2024-09-13 PROCEDURE — 99283 EMERGENCY DEPT VISIT LOW MDM: CPT

## 2024-09-13 ASSESSMENT — ACTIVITIES OF DAILY LIVING (ADL)
ADLS_ACUITY_SCORE: 36
ADLS_ACUITY_SCORE: 36

## 2024-09-14 NOTE — ED NOTES
Attempted to place an IV and collect lab work, but patient refused again.  Attempted to educate her on the necessity of the test, but she continued to refuse.  MD notified.

## 2024-09-14 NOTE — ED NOTES
Patient agreed to alcohol breathalyzer and participated with good effort.  Breathalyzer result: 0.293

## 2024-09-14 NOTE — ED NOTES
Patient's son arrived.  MD and this writer brought patient's son into patient's room.  The son was notified of patient's refusal of testing and her request to be discharged.  Discharge risks were explained to the patient and her son.  The son verbalized understanding of discharge risks and agreed to transport patient home.

## 2024-09-14 NOTE — ED NOTES
Bed: [facility]  Expected date:   Expected time:   Means of arrival:   Comments:  E3  69 F ho seizures/intoxicated/pbt 0.136/unexplained behavior  1902

## 2024-09-14 NOTE — DISCHARGE INSTRUCTIONS

## 2024-09-14 NOTE — ED TRIAGE NOTES
Outside liquor store, intoxicated, stumbling. Sat down and a passing neurologist said it appeared she was seizing briefly. By time EMS arrived, no seizure activity, appeared drunk, unable to walk safely. ETOH blow 0.36. Hx of sz, but they typically present as vertigo per EMS report.

## 2024-09-14 NOTE — ED PROVIDER NOTES
Emergency Department Note      History of Present Illness   Chief Complaint   Alcohol Intoxication and Seizures    HPI   Megan Bettencourt is a 69 year old female who presents with EMS from outside of a liquor store appearing intoxicated and stumbling.  There was report by EMS that there was a neurologist passing by who thought the patient appeared to be seizing.  The patient was cooperative with EMS but was unable to walk safely and appeared intoxicated so was brought to the emergency department for further evaluation.  Breathalyzer at the scene was reported to be 0.36.  Patient states that she has been drinking quite a bit but walks to and from the liquor store from her house in [place].  She does have a history of seizures and states that she takes her seizure medications as prescribed.  She also states that she follows up with her neurologist routinely.  She denies any injuries tonight.    I obtained collateral information from the patient's son and significant other, Chidi and Raymon respectively.  They note that she does drink regularly and does have underlying seizure disorder as well.  Raymon notes that she had a grand mal seizure approximately 1 year ago.  She has also had a history of petite mal seizures.  He is not sure whether she has been compliant with her Keppra medications but tells him that she takes it routinely.    Independent Historian   Son, EMS, and Significant other as detailed above.    Review of External Notes   None    Past Medical History     Medical History and Problem List   Past Medical History:   Diagnosis Date    Anxiety     Cervical stenosis of spine     Pelvic fracture (H)     Thyroid disease     Vision disturbance        Medications   acetaminophen (TYLENOL) 325 MG tablet  amphetamine-dextroamphetamine (ADDERALL) 10 MG tablet  diazepam (VALIUM) 5 MG tablet  ipratropium (ATROVENT) 0.03 % nasal spray  LORazepam (ATIVAN) 1 MG tablet  metoprolol succinate ER (TOPROL XL) 25 MG 24 hr  "tablet  venlafaxine (EFFEXOR) 37.5 MG tablet      Surgical History   Past Surgical History:   Procedure Laterality Date    BREAST SURGERY      augmentation     SECTION      COSMETIC BLEPHAROPLASTY LOWER LIDS BILATERAL  2014    Procedure: COSMETIC BLEPHAROPLASTY LOWER LIDS BILATERAL;  Surgeon: Loi Mcpherson MD;  Location: [facility]    COSMETIC SURGERY      neck lift    ORTHOPEDIC SURGERY  2017    bilateral wrists and right heel fracture    REPAIR PTOSIS BILATERAL  2014    Procedure: REPAIR PTOSIS BILATERAL;  Surgeon: Loi Mcpherson MD;  Location: [facility]     Physical Exam     Patient Vitals for the past 24 hrs:   BP Temp Temp src Pulse Resp SpO2 Height Weight   24 1915 116/66 98.8  F (37.1  C) Temporal 93 12 (!) 89 % 1.727 m (5' 8\") 63.5 kg (140 lb)     Physical Exam  General: Intoxicated, appears well-developed and well-nourished. Limited participation in exam.   HEENT:  Head:  Atraumatic  Ears:  External ears are normal  Mouth/Throat:  Oropharynx is without erythema or exudate and mucous membranes are dry.   Eyes:   Conjunctivae normal and EOM are normal. No scleral icterus.    Pupils are equal, round, and reactive to light.   CV:  Normal rate, regular rhythm, normal heart sounds and radial pulses are 2+ and symmetric.  No murmur.  Resp:  Breath sounds are clear bilaterally    Non-labored, no retractions or accessory muscle use  GI:  Abdomen is soft, no distension, no tenderness. No rebound or guarding.  No CVA tenderness bilaterally  MS:  Normal range of motion. No edema.    Back atraumatic.    No midline cervical, thoracic, or lumbar tenderness  Skin:  Warm and dry.  No rash or lesions noted.  Neuro:   Intoxicated. Moving all extremities.  GCS: 14  Psych: Unable to assess due to intoxication.     Diagnostics   Lab Results   Labs Ordered and Resulted from Time of ED Arrival to Time of ED Departure - No data to display    Imaging   No orders to display     Independent " Interpretation   None    ED Course      Medications Administered   Medications - No data to display    Procedures   Procedures     Discussion of Management   None    ED Course        Additional Documentation  None    Medical Decision Making / Diagnosis     CMS Diagnoses: None    MIPS       None    MDM   Megan Bettencourt is a 69 year old female who presents for evaluation of alcohol abuse and possible seizure.  She is intoxicated here in ED clinically and by breathalyzer.  Given the report that there was a passing neurologist that may have witnessed some seizure activity I did recommend EKG, CT scan of the head as well as blood work.  Patient states that she is not taking her Keppra medication because she prefers holistic treatment and has been off of her Keppra for an unclear period of time.  I was able to speak both with her significant other Raymon and her son Chidi.  Her son Chidi presented to the emergency department and we had a shared discussion at bedside as the patient was refusing blood work, CT scan, and EKG.  I did discuss my concern that the patient may have had a seizure although there was not a clear report of whether this was a generalized tonic-clonic seizure versus a potential partial seizure.  She has both a history of grand mal and petite mal seizures.  Of course with being noncompliant with her outpatient antiepileptic regimen this would increase her risk for breakthrough seizures.  The patient is continuing to decline workup and the son feels comfortable taking her home at this time.  We did perform a breathalyzer to confirm alcohol intoxication and this returned positive at 0.29.  I certainly encouraged the patient to follow-up with her neurologist or her primary care doctor.  They are encouraged to return to the emergency department at any point should they desire workup or further investigation.  The son feels comfortable taking his mother home at this time.  They understand that this is  against what I would advise given her history of seizures and alcohol abuse.  Sober son, Chidi, ultimately took the patient home this evening and plan to follow-up in the outpatient setting.  Patient discharged.      Disposition   The patient was discharged.     Diagnosis     ICD-10-CM    1. Acute alcoholic intoxication in alcoholism without complication (H)  F10.220       2. Hx of seizure disorder  Z86.69       3. Noncompliance with medication regimen  Z91.148            Discharge Medications   New Prescriptions    No medications on file     MD Sumeet Tan Scott, MD  09/14/24 0036

## 2024-09-14 NOTE — ED NOTES
MD evaluated patient and recommended blood work and fluids.  Patient refused.  MD called patient's son, Chidi, who agreed with the provider's recommendations.  The son was able to talk to the patient over the phone.  After ending the call, the patient agreed to testing.  MD notified.

## 2024-12-22 ENCOUNTER — HEALTH MAINTENANCE LETTER (OUTPATIENT)
Age: 69
End: 2024-12-22

## 2024-12-23 ENCOUNTER — APPOINTMENT (OUTPATIENT)
Dept: CT IMAGING | Facility: CLINIC | Age: 69
End: 2024-12-23
Attending: EMERGENCY MEDICINE
Payer: COMMERCIAL

## 2024-12-23 ENCOUNTER — HOSPITAL ENCOUNTER (EMERGENCY)
Facility: CLINIC | Age: 69
Discharge: HOME | End: 2024-12-23
Attending: EMERGENCY MEDICINE | Admitting: EMERGENCY MEDICINE
Payer: COMMERCIAL

## 2024-12-23 VITALS
RESPIRATION RATE: 16 BRPM | OXYGEN SATURATION: 96 % | BODY MASS INDEX: 21.22 KG/M2 | HEIGHT: 68 IN | DIASTOLIC BLOOD PRESSURE: 96 MMHG | HEART RATE: 109 BPM | WEIGHT: 140 LBS | SYSTOLIC BLOOD PRESSURE: 156 MMHG | TEMPERATURE: 97.4 F
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
## 2024-12-23 DIAGNOSIS — G62.9 NEUROPATHY: ICD-10-CM
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
## 2024-12-23 DIAGNOSIS — Z86.69 HISTORY OF SEIZURE DISORDER: ICD-10-CM

## 2024-12-23 DIAGNOSIS — S06.0X9A CONCUSSION WITH LOSS OF CONSCIOUSNESS, INITIAL ENCOUNTER: ICD-10-CM

## 2024-12-23 LAB
ANION GAP SERPL CALCULATED.3IONS-SCNC: 12 MMOL/L (ref 7–15)
BUN SERPL-MCNC: 9.1 MG/DL (ref 8–23)
CALCIUM SERPL-MCNC: 9 MG/DL (ref 8.8–10.4)
CHLORIDE SERPL-SCNC: 100 MMOL/L (ref 98–107)
CREAT SERPL-MCNC: 0.7 MG/DL (ref 0.51–0.95)
EGFRCR SERPLBLD CKD-EPI 2021: >90 ML/MIN/1.73M2
ERYTHROCYTE [DISTWIDTH] IN BLOOD BY AUTOMATED COUNT: 13.1 % (ref 10–15)
GLUCOSE SERPL-MCNC: 90 MG/DL (ref 70–99)
HCO3 SERPL-SCNC: 30 MMOL/L (ref 22–29)
HCT VFR BLD AUTO: 39.2 % (ref 35–47)
HGB BLD-MCNC: 13.4 G/DL (ref 11.7–15.7)
MCH RBC QN AUTO: 33.1 PG (ref 26.5–33)
MCHC RBC AUTO-ENTMCNC: 34.2 G/DL (ref 31.5–36.5)
MCV RBC AUTO: 97 FL (ref 78–100)
PLATELET # BLD AUTO: 406 10E3/UL (ref 150–450)
POTASSIUM SERPL-SCNC: 3.6 MMOL/L (ref 3.4–5.3)
RBC # BLD AUTO: 4.05 10E6/UL (ref 3.8–5.2)
SODIUM SERPL-SCNC: 142 MMOL/L (ref 135–145)
WBC # BLD AUTO: 5.8 10E3/UL (ref 4–11)

## 2024-12-23 PROCEDURE — 82565 ASSAY OF CREATININE: CPT | Performed by: EMERGENCY MEDICINE

## 2024-12-23 PROCEDURE — 82374 ASSAY BLOOD CARBON DIOXIDE: CPT | Performed by: EMERGENCY MEDICINE

## 2024-12-23 PROCEDURE — 36415 COLL VENOUS BLD VENIPUNCTURE: CPT | Performed by: EMERGENCY MEDICINE

## 2024-12-23 PROCEDURE — 85018 HEMOGLOBIN: CPT | Performed by: EMERGENCY MEDICINE

## 2024-12-23 PROCEDURE — 99284 EMERGENCY DEPT VISIT MOD MDM: CPT | Mod: 25

## 2024-12-23 PROCEDURE — 70450 CT HEAD/BRAIN W/O DYE: CPT

## 2024-12-23 PROCEDURE — 85027 COMPLETE CBC AUTOMATED: CPT | Performed by: EMERGENCY MEDICINE

## 2024-12-23 PROCEDURE — 85041 AUTOMATED RBC COUNT: CPT | Performed by: EMERGENCY MEDICINE

## 2024-12-23 PROCEDURE — 80048 BASIC METABOLIC PNL TOTAL CA: CPT | Performed by: EMERGENCY MEDICINE

## 2024-12-23 ASSESSMENT — COLUMBIA-SUICIDE SEVERITY RATING SCALE - C-SSRS
1. IN THE PAST MONTH, HAVE YOU WISHED YOU WERE DEAD OR WISHED YOU COULD GO TO SLEEP AND NOT WAKE UP?: NO
2. HAVE YOU ACTUALLY HAD ANY THOUGHTS OF KILLING YOURSELF IN THE PAST MONTH?: NO
6. HAVE YOU EVER DONE ANYTHING, STARTED TO DO ANYTHING, OR PREPARED TO DO ANYTHING TO END YOUR LIFE?: NO

## 2024-12-23 ASSESSMENT — ACTIVITIES OF DAILY LIVING (ADL)
ADLS_ACUITY_SCORE: 53

## 2024-12-23 NOTE — ED TRIAGE NOTES
Pt presents with assumed seizure last week. Pt was found unresponsive in kitchen. Pt reports that she has been having tremors and weakness since fall. Pt reports she has seizure hx since last year but stopped meds due to side effects.      Triage Assessment (Adult)       Row Name 12/23/24 1412          Triage Assessment    Airway WDL WDL        Respiratory WDL    Respiratory WDL WDL        Peripheral/Neurovascular WDL    Peripheral Neurovascular WDL WDL        Cognitive/Neuro/Behavioral WDL    Cognitive/Neuro/Behavioral WDL --  Seizure

## 2024-12-23 NOTE — ED PROVIDER NOTES
Emergency Department Note      History of Present Illness     Chief Complaint   Fall      HPI   Megan Bettencourt is a 69 year old female with a history of psychogenic seizure presenting to the ED following a fall. She is accompanied in the ED by her boyfriend who explains that roughly a week ago the patient suffered what he assumes to have been a seizure. He found her on the kitchen floor unresponsive, she remained that way for roughly 5-6 minutes before coming to. After waking she was reportedly lethargic and reported feeling dizzy. Her boyfriend believes it likely she suffered a concussion. She states that she has suffered ongoing weakness and right sided tremors. She last met with a neurologist roughly three years ago, she was started on Keppra though ceased this shortly after and has not taken seizure medications since. Today she attempted to find a neurology appointment though was told to come to the ED because the next available appointment was over a week from now. She denies any new medications. She states she has not been drinking alcohol over the past few weeks. No recent fever, cough, or sore throat. She endorses instability with ambulation though attributes this to her baseline neuropathy.     Independent Historian   None    Review of External Notes   Reviewed 24 ED note from Dr. Fay. Seen for alcohol abuse and seizures.     Past Medical History     Medical History and Problem List   Anxiety  Cervical stenosis of spine  Pelvic fracture  Thyroid disease  Seizures  Blunt trauma of multiple sites  Pneumothorax  Alcohol withdrawal syndrome   Non intractable headache     Medications   Tylenol  Adderall  Valium  Atrovent  Ativan  Toprol  Effexor    Surgical History   Past Surgical History:   Procedure Laterality Date    BREAST SURGERY      augmentation     SECTION      COSMETIC BLEPHAROPLASTY LOWER LIDS BILATERAL  2014    Procedure: COSMETIC BLEPHAROPLASTY LOWER LIDS BILATERAL;  Surgeon:  "Loi Mcpherson MD;  Location: [facility]    COSMETIC SURGERY      neck lift    ORTHOPEDIC SURGERY  06/2017    bilateral wrists and right heel fracture    REPAIR PTOSIS BILATERAL  6/30/2014    Procedure: REPAIR PTOSIS BILATERAL;  Surgeon: Loi Mcpherson MD;  Location: [facility]       Physical Exam     Patient Vitals for the past 24 hrs:   BP Temp Temp src Pulse Resp SpO2 Height Weight   12/23/24 1415 (!) 156/96 97.4  F (36.3  C) Temporal 109 16 96 % 1.727 m (5' 8\") 63.5 kg (140 lb)     Physical Exam  Physical Exam   Constitutional:  Patient is oriented to person, place, and time. They appear well-developed and well-nourished. Mild distress secondary to recent fall.   HENT:   Mouth/Throat:   Oropharynx is clear and moist.   Eyes:    Conjunctivae normal and EOM are normal. Pupils are equal, round, and reactive to light.   Neck:    Normal range of motion.   Cardiovascular: Normal rate, regular rhythm and normal heart sounds.  Exam reveals no gallop and no friction rub.  No murmur heard.  Pulmonary/Chest:  Effort normal and breath sounds normal. Patient has no wheezes. Patient has no rales.   Abdominal:   Soft. Bowel sounds are normal. Patient exhibits no mass. There is no tenderness. There is no rebound and no guarding.   Musculoskeletal:  Normal range of motion. Patient exhibits no edema.   Neurological:   Patient is alert and oriented to person, place, and time. Patient has normal strength. No cranial nerve deficit. Decreased sensation in her right foot. Slight tremors in her right hand. NIH stroke scale of zero.  Skin:   Skin is warm and dry. No rash noted. No erythema.   Psychiatric:   Patient has a normal mood.    Lab Results   Labs Ordered and Resulted from Time of ED Arrival to Time of ED Departure   CBC WITH PLATELETS - Abnormal       Result Value    WBC Count 5.8      RBC Count 4.05      Hemoglobin 13.4      Hematocrit 39.2      MCV 97      MCH 33.1 (*)     MCHC 34.2      RDW 13.1      Platelet Count 406   "   BASIC METABOLIC PANEL - Abnormal    Sodium 142      Potassium 3.6      Chloride 100      Carbon Dioxide (CO2) 30 (*)     Anion Gap 12      Urea Nitrogen 9.1      Creatinine 0.70      GFR Estimate >90      Calcium 9.0      Glucose 90       Imaging   Head CT w/o contrast   Final Result   IMPRESSION: No acute intracranial pathology.       SKY MEDINA MD            SYSTEM ID:  [id number]        Independent Interpretation   None    ED Course      Medications Administered   Medications - No data to display    Procedures   Procedures     Discussion of Management   None    ED Course   ED Course as of 12/23/24 1711   Mon Dec 23, 2024   1635 I obtained history and examined the patient as noted above.     Additional Documentation  None    Medical Decision Making / Diagnosis     CMS Diagnoses: None    MIPS       None    MDM   Megan Bettencourt is a 69 year old female who presents after a fall.  She has a history of spells is not clear what happened a week ago.  She was neurologically intact.  She states that she has been having these spells and some neuropathy and some tremors on her right side she tried to get into a neurologist but they sent her to the emergency room.  Here she does not appear intoxicated.  Her CT of her head did not show any acute injuries.  She has symptoms which are consistent with concussion.  She does have some altered sensation in her feet which has been that way for quite some time and I do suspect she has neuropathy.  She states she does not drink that much alcohol.  At this time given her reassuring workup with blood work and CT with no other injuries from head to toe I do feel she is safe to be discharged.  She will follow-up with her primary care doctor if her symptoms of head fog continue they can always refer her to concussion clinic.  She should follow-up with her neurology for her neuropathy symptoms and his seizure disorder for which she is opting for natural therapy.  Etiologies are not  clear but they may want to get her restarted on antiepileptic therapy.  At this time she is safe to be discharged    Disposition   The patient was discharged.     Diagnosis     ICD-10-CM    1. Fall at home, initial encounter  W19.XXXA     Y92.009       2. History of seizure disorder  Z86.69       3. Concussion with loss of consciousness, initial encounter  S06.0X9A Primary Care Referral      4. Neuropathy  G62.9            Discharge Medications   New Prescriptions    No medications on file     Scribe Disclosure:  I, CAL QUINTANILLA, am serving as a scribe at 4:41 PM on 12/23/2024 to document services personally performed by Becca Curry MD based on my observations and the provider's statements to me.      Becca Curry MD  12/23/24 1941

## 2025-07-04 ENCOUNTER — HOSPITAL ENCOUNTER (EMERGENCY)
Facility: CLINIC | Age: 70
Discharge: HOME | End: 2025-07-04
Payer: COMMERCIAL

## 2025-07-04 VITALS
HEART RATE: 110 BPM | RESPIRATION RATE: 18 BRPM | TEMPERATURE: 97.3 F | SYSTOLIC BLOOD PRESSURE: 151 MMHG | OXYGEN SATURATION: 98 % | DIASTOLIC BLOOD PRESSURE: 100 MMHG

## 2025-07-04 DIAGNOSIS — S05.02XA ABRASION OF LEFT CORNEA, INITIAL ENCOUNTER: ICD-10-CM

## 2025-07-04 PROCEDURE — 250N000009 HC RX 250

## 2025-07-04 PROCEDURE — 99283 EMERGENCY DEPT VISIT LOW MDM: CPT

## 2025-07-04 RX ORDER — OFLOXACIN 3 MG/ML
1-2 SOLUTION/ DROPS OPHTHALMIC 4 TIMES DAILY
Qty: 10 ML | Refills: 0 | Status: SHIPPED | OUTPATIENT
Start: 2025-07-04 | End: 2025-07-04

## 2025-07-04 RX ORDER — OFLOXACIN 3 MG/ML
1-2 SOLUTION/ DROPS OPHTHALMIC 4 TIMES DAILY
Qty: 10 ML | Refills: 0 | Status: SHIPPED | OUTPATIENT
Start: 2025-07-04 | End: 2025-07-11

## 2025-07-04 RX ORDER — PROPARACAINE HYDROCHLORIDE 5 MG/ML
1 SOLUTION/ DROPS OPHTHALMIC ONCE
Status: COMPLETED | OUTPATIENT
Start: 2025-07-04 | End: 2025-07-04

## 2025-07-04 RX ADMIN — PROPARACAINE HYDROCHLORIDE 1 DROP: 5 SOLUTION/ DROPS OPHTHALMIC at 16:24

## 2025-07-04 RX ADMIN — FLUORESCEIN SODIUM 1 STRIP: 1 STRIP OPHTHALMIC at 16:24

## 2025-07-04 ASSESSMENT — VISUAL ACUITY
OD: 20/25;WITHOUT CORRECTIVE LENSES
OS: 20/40;WITHOUT CORRECTIVE LENSES
OU: NORMAL

## 2025-07-04 ASSESSMENT — ACTIVITIES OF DAILY LIVING (ADL)
ADLS_ACUITY_SCORE: 53
ADLS_ACUITY_SCORE: 53

## 2025-07-04 NOTE — ED PROVIDER NOTES
Emergency Department Note      History of Present Illness     Chief Complaint   Eye Injury      HPI   Megan Bettencourt is a 70 year old female who presents to the ED for evaluation of an eye injury. The patient reports that about an hour prior to ED arrival, she accidentally poked her left eye with a stick while weeding. Patient states that her eye was red at first but not anymore. She endorses significant pain of the left eye. Patient reports that she has had a corneal abrasion in the past and this feels similar. Patient uses contacts and glasses but was not wearing contacts at the time of the incident.     Independent Historian   None    Review of External Notes   None    Past Medical History     Medical History and Problem List   Anxiety  Cervical stenosis of spine  Pelvic fracture  Thyroid disease  Seizure  Pneumothorax  Alcohol withdrawal syndrome     Medications   Adderall  Valium  Atrovent  Ativan  Metoprolol  Effexor    Surgical History   Breast surgery; augmentation    Cosmetic blepharoplasty    Physical Exam     Patient Vitals for the past 24 hrs:   BP Temp Temp src Pulse Resp SpO2   25 1550 -- 97.3  F (36.3  C) Temporal -- 18 --   25 1548 (!) 151/100 -- -- 110 -- 98 %     Physical Exam  General:  Appears uncomfortable, holding left eye closed.      Head: Atraumatic.    EENT:   Eyes: PERRL.  EOMI.  Conjunctivae clear bilaterally.  No drainage/discharge.  No periorbital erythema or edema.  Fluorescein exam shows no foreign body or ulcer.  There is a crescent shaped corneal abrasion to the lower half of the iris that does not cross the pupil.  Lid was everted without foreign body.  Slit lamp exam without cell and flare.    Nose: Nares patent bilaterally.   Throat: Moist mucus membranes.  Cardiovascular:  Regular rate.  Lungs:  Normal work of breathing.   Skin: No rash.       Diagnostics     Lab Results   Labs Ordered and Resulted from Time of ED Arrival to Time of ED Departure - No  data to display    Imaging   No orders to display       EKG   None    Independent Interpretation   None    ED Course      Medications Administered   Medications   fluorescein (FUL-CHESTER) ophthalmic strip 1 strip (1 strip Both Eyes $Given 7/4/25 1624)   proparacaine (ALCAINE) 0.5 % ophthalmic solution 1 drop (1 drop Both Eyes $Given 7/4/25 1624)       Procedures   None    Discussion of Management   None    ED Course   ED Course as of 07/04/25 1659   Fri Jul 04, 2025   1636 I obtained history and examined the patient as noted above.        Additional Documentation  None    Medical Decision Making / Diagnosis     CMS Diagnoses: None    MIPS   None    MDM   Megan Bettencourt is a 70 year old female who presents to the ER for evaluation of eye pain.  VS on presentation reveal slightly elevated blood pressure but otherwise stable.  Differential diagnosis is broad, including but not limited to, corneal abrasion, conjunctivitis (viral, bacterial, allergic), corneal ulceration, glaucoma, herpes zoster, keratitis (viral, bacterial, UV induced), uveitis, corneal laceration, corneal perforation, lens disruption, among others.  History and examination is most consistent with corneal abrasion given associated ocular pain, relief with topical proparacaine, photophobia, conjunctival injection, and slit lamp examination findings.  Examination, including eversion of the lids, does not reveal evidence of retained foreign body.  Perforation is considered, although felt to be unlikely at this time given the mechanism of injury, negative Marisa sign, absence of hyphema, absence of iris prolapse, and round pupils.  Tetanus status is up to date at this time.  Patient is a contact lenses wearer placing her at increased risk for pseudomonal infection.  Supportive therapy is indicated at this time, including topical antibiotic eye drops, symptom control, and close outpatient follow-up with ophthalmology.  Patient will be discharged home with  Ocuflox.  Follow-up with ophthalmologist in 24 hours, and instructed not to put anything in the affected eye until seen in follow-up (including contacts).  Return to ER with uncontrolled pain, vision loss, swelling around the eye, purulent drainage, fevers, or any other new or troubling symptoms.  Patient verbalized understanding of the recommended plan of care, questions were answered, and she was discharged home in improved condition.    Disposition   The patient was discharged.     Diagnosis     ICD-10-CM    1. Abrasion of left cornea, initial encounter  S05.02XA            Discharge Medications   New Prescriptions    OFLOXACIN (OCUFLOX) 0.3 % OPHTHALMIC SOLUTION    Place 1-2 drops Into the left eye 4 times daily for 7 days.         Scribe Disclosure:  TING, Carlos Land, am serving as a scribe at 4:06 PM on 7/4/2025 to document services personally performed by Lisseth Linares PA-C based on my observations and the provider's statements to me.        Lisseth Linares PA-C  07/04/25 1922

## 2025-07-04 NOTE — ED TRIAGE NOTES
Pt reports when she was weeding today, she bent over and got a stick into her left eye. Pt reports a lot of pain, was not wearing any contacts.

## 2025-07-04 NOTE — DISCHARGE INSTRUCTIONS
Ocuflox drops to left eye every 4 hours while awake    Small amount of proparacaine drops provided if needed (use only once if needed)    Ibuprofen 600 mg every 6 hours if needed for pain    Follow up with Eye doctor next week    Go to [facility] for further eye evaluation if developing severe pain, vision changes, fevers

## (undated) RX ORDER — FENTANYL CITRATE 50 UG/ML
INJECTION, SOLUTION INTRAMUSCULAR; INTRAVENOUS
Status: DISPENSED
Start: 2019-02-07